# Patient Record
Sex: FEMALE | Race: ASIAN | NOT HISPANIC OR LATINO | URBAN - METROPOLITAN AREA
[De-identification: names, ages, dates, MRNs, and addresses within clinical notes are randomized per-mention and may not be internally consistent; named-entity substitution may affect disease eponyms.]

---

## 2017-01-18 DIAGNOSIS — N91.2 ABSENCE OF MENSTRUATION: Primary | ICD-10-CM

## 2017-01-19 ENCOUNTER — OFFICE VISIT (OUTPATIENT)
Dept: FAMILY MEDICINE | Facility: CLINIC | Age: 42
End: 2017-01-19

## 2017-01-19 VITALS
TEMPERATURE: 97.9 F | OXYGEN SATURATION: 100 % | RESPIRATION RATE: 16 BRPM | HEIGHT: 61 IN | SYSTOLIC BLOOD PRESSURE: 130 MMHG | BODY MASS INDEX: 22.68 KG/M2 | HEART RATE: 74 BPM | WEIGHT: 120.13 LBS | DIASTOLIC BLOOD PRESSURE: 84 MMHG

## 2017-01-19 DIAGNOSIS — Z32.01 PREGNANCY TEST POSITIVE: Primary | ICD-10-CM

## 2017-01-19 DIAGNOSIS — N91.2 ABSENCE OF MENSTRUATION: ICD-10-CM

## 2017-01-19 LAB — HCG UR QL: POSITIVE

## 2017-01-19 RX ORDER — PRENATAL VIT/IRON FUM/FOLIC AC 27MG-0.8MG
1 TABLET ORAL DAILY
Qty: 100 TABLET | Refills: 3 | Status: SHIPPED | OUTPATIENT
Start: 2017-01-19 | End: 2017-10-26

## 2017-01-19 NOTE — PROGRESS NOTES
"S: Stephanie Garcia is a 41 year old female who returns for follow up of  [place] 12/03/15  Patients states that main concern today is  pregnancy     PMHX/PSHX/MEDS/ALLERGIES/SHX/FHX reviewed and updated in Epic.      ROS:  General: No fevers, chills  Head: No headache  Ears: No acute change in hearing.    CV: No chest pain or palpitations.  Resp: No shortness of breath.  No cough. No hemoptysis.  GI: No nausea, vomiting, constipation, diarrhea  : No urinary pains    O: /84 mmHg  Pulse 74  Temp(Src) 97.9  F (36.6  C) (Oral)  Resp 16  Ht 5' 0.75\" (154.3 cm)  Wt 120 lb 2 oz (54.488 kg)  BMI 22.89 kg/m2  SpO2 100%  LMP 12/07/2016  Breastfeeding? No   Gen:  Well nourished and in NAD    CV:  RRR  - no murmurs, rubs, or gallups,   Pulm:  CTAB, no wheezes/rales/rhonchi, good air entry   ABD: soft, nontender, no masses, no rebound, BS intact throughout  Extrem: no cyanosis, edema or clubbing  Psych: Euthymic      (Z32.01) Pregnancy test positive  (primary encounter diagnosis)  Comment: probably 4 weeks  Plan:  Prenatal visit   Prenatal vitamins   Confirmation of pregancy    (N91.2) Absence of menstruation  Comment: see above  Plan: see above    Discussed second  hand smoking  Discussed: high risk pregnancy/age    RTC i for prenatal  visit or sooner if develops new or worsening symptoms.    Edenilson Blunt"

## 2017-01-19 NOTE — PATIENT INSTRUCTIONS
You pregnancy test was positive. You are considered high risk pregnancy due to your age. Starting Prenatal Vitamins at this time is very important. A prescription will be sent to [facility]. Please start these immediately.    It is safe to take Tylenol over the counter during the pregnancy.    Right now you have no physical limitations. This may change later in the pregnancy.    It is safe to have sex during pregnancy.     Mediterranean diet is healthy and safe even during pregnancy.     We deliver babies at [facility].    Schedule a New OB Physical with a Provider right away.    We will have Antonella (our OB Nurse) meet with you before you leave today.

## 2017-02-02 ENCOUNTER — OFFICE VISIT (OUTPATIENT)
Dept: FAMILY MEDICINE | Facility: CLINIC | Age: 42
End: 2017-02-02

## 2017-02-02 ENCOUNTER — ALLIED HEALTH/NURSE VISIT (OUTPATIENT)
Dept: FAMILY MEDICINE | Facility: CLINIC | Age: 42
End: 2017-02-02

## 2017-02-02 VITALS
SYSTOLIC BLOOD PRESSURE: 118 MMHG | HEART RATE: 59 BPM | TEMPERATURE: 97.8 F | BODY MASS INDEX: 22.66 KG/M2 | WEIGHT: 120 LBS | DIASTOLIC BLOOD PRESSURE: 78 MMHG | HEIGHT: 61 IN

## 2017-02-02 DIAGNOSIS — O09.529 SUPERVISION OF HIGH-RISK PREGNANCY OF ELDERLY MULTIGRAVIDA: Primary | ICD-10-CM

## 2017-02-02 DIAGNOSIS — Z34.01 ENCOUNTER FOR SUPERVISION OF NORMAL FIRST PREGNANCY IN FIRST TRIMESTER: Primary | ICD-10-CM

## 2017-02-02 DIAGNOSIS — O09.529 AMA (ADVANCED MATERNAL AGE) MULTIGRAVIDA 35+: ICD-10-CM

## 2017-02-02 LAB
BILIRUBIN UR: NEGATIVE
BLOOD UR: ABNORMAL
GLUCOSE URINE: NEGATIVE
HEMOGLOBIN: 12 G/DL (ref 11.7–15.7)
HIV 1+2 AB+HIV1 P24 AG SERPL QL IA: NEGATIVE
KETONES UR QL: NEGATIVE
LEUKOCYTE ESTERASE UR: NEGATIVE
NITRITE UR QL STRIP: NEGATIVE
PH UR STRIP: 5 [PH] (ref 5–7)
PROTEIN UR: NEGATIVE
SP GR UR STRIP: >=1.03
UROBILINOGEN UR STRIP-ACNC: ABNORMAL

## 2017-02-02 RX ORDER — PYRIDOXINE HCL (VITAMIN B6) 25 MG
25 TABLET ORAL 4 TIMES DAILY PRN
Qty: 120 TABLET | Refills: 1 | Status: SHIPPED | OUTPATIENT
Start: 2017-02-02 | End: 2017-07-11

## 2017-02-02 NOTE — Clinical Note
February 9, 2017      Stephanie Garcia  [address]  [place] [address]        Dear Stephanie,    Please see below for your test results.    Resulted Orders   ABO/Rh Type-HML ([facility])   Result Value Ref Range    ABO/Rh(D) A POS     Repeat ABO/Rh Typing (HML) A POS     Narrative    Test performed by:   [facility] [address], [place] [address]   Antibody Screen ([facility])   Result Value Ref Range    Antibody Screen Negative Negative    Narrative    Test performed by:   [facility] [address], [place] [address]   Urinalysis([facility])   Result Value Ref Range    Specific Gravity Urine >=1.030 1.005 - 1.030    pH Urine 5.0 4.5 - 8.0    Leukocyte Esterase UR Negative NEGATIVE    Nitrite Urine Negative NEGATIVE    Protein UR Negative NEGATIVE    Glucose Urine Negative NEGATIVE    Ketones Urine Negative NEGATIVE    Urobilinogen mg/dL 0.2 E.U./dL 0.2 E.U./dL    Bilirubin UR Negative NEGATIVE    Blood UR 2+ (A) NEGATIVE   Hemoglobin (HGB) ([facility])   Result Value Ref Range    Hemoglobin 12.0 11.7 - 15.7 g/dL   Hepatitis B Surface Ag ([facility])   Result Value Ref Range    Hepatitis B Surface Antigen Negative Negative    Narrative    Test performed by:  [facility] [address] [place] [address]   Syphilis Screen [place]iles ([facility])   Result Value Ref Range    Syphilis Screen [place]ascade Non-Reactive Non-Reactive    Narrative    Test performed by:  [facility] [address] [place] [address]   HIV Ag/Ab Screen [place] ([facility])   Result Value Ref Range    HIV Antigen/Antibody Negative Negative    Narrative    Test performed by:  [facility] [address] [place] [address]   Culture Urine ([facility])   Result Value Ref Range    Culture SEE RESULTS BELOW       Comment:      CULTURE, URINE   SOURCE: Urine, Clean Catch   CULTURE RESULTS:    No Growth      Narrative    Test performed by:  [facility] [address] [place]  [address]   Rubella  IgG ([facility])   Result Value Ref Range    Rubella IgG Immune Immune    Narrative    Test performed by:  [facility] [address] [place] [address]   Chlamydia/Gono Amplified ([facility])   Result Value Ref Range    Chlamydia trac,Amplified Prb Negative Negative    N gonorrhoeae,Amplified Prb Negative Negative    Narrative    Test performed by:  [facility] [address] [place] [address]   Hepatitis B Surface Ab ([facility])   Result Value Ref Range    Hepatitis B Surface Antibody Negative Negative    Narrative    Test performed by:  [facility] [address] [place] [address]   GYN Cytology ([facility])   Result Value Ref Range    Lab AP Case Report SEE RESULTS BELOW       Comment:      Gynecologic Cytology Report                       Case: [id number]                                     Authorizing Provider:  Morales Kee MD         Collected:             02/02/2017 1600              First Screen:          BARBARA Downs (ASCP)   Received:              02/03/2017 0902              Specimen:    SUREPATH PAP, SCREENING, Endocervical/cervical                                               Lab AP Gyn Interpretation SEE RESULTS BELOW       Comment:      Negative for squamous intraepithelial lesion or malignancy  Electronically signed by BARBARA Downs (ASCP) on 2/7/2017 at 12:00 PM      Lab AP Malignant? Normal Normal    Specimen adequacy:       Satisfactory for evaluation, endocervical/transformation zone component present    HPV Reflex? Yes regardless of result     High Risk? No     Last Mens Period dec 7, 2016     Lab AP Abnormal Bleeding No     Lab AP Patient Status pregnant     Lab AP Birth Control/Hormones None     Lab AP Previous Normal > 5 years ago     Lab AP Previous Abnl none     Lab AP Cervical Appearance normal     Narrative    Test performed by:  [facility] [address] [place] [address]   Ree Zoster  Imm Status Michelle ([facility])   Result Value Ref Range    V.zoster Immune Status Immune Immune    Narrative    Test performed by:  [facility] [address] [place] [address]   Lead, Blood ([facility])   Result Value Ref Range    Lead <1.9 <5.0 ug/dL    Collection Method Venous     Narrative    Test performed by:  [facility] [address] [place] [address]   HPV [place] PCR ([facility])   Result Value Ref Range    Interpretation No HPV Type(s) Detected No HPV Type(s) Detected, No High Risk HP     Gabe Chandler MD, [facility]       Comment:         Testing was performed at [facility], [address] [place] [address], with final verification at the indicated laboratory.    Interpretation was performed at [facility] [address] [place] [address].      Narrative    Test performed by:  [facility] [address] [place] [address]  No HPV Type(s) Detected  Interpretation: The sample is negative for the presence of DNA from one or   more of the following high risk HPV types (16, 18, 31, 33, 35, 39, 45, 51, 52,   56, 58, 66, and 68) and/or probable high or low risk HPV types (2a, 6, 11,   26, 30, 32, 34, 42, 43, 44, 53, 54, 55, 57, 61, 62, 67, 69, 70, 71, 72, 73,   74, 77, 81, 82, 83, 84, 85, 87, 89).  High risk types are classified by the   IARC as carcinogenicity, probably, or possible carcinogenic to humans.    According to the IARC, low risk types are not classifiable as to their   carcingenicity to humans. These results do not exclude the possibility of   additional low or high risk HPV types not detected due to adequacy and   representativeness of sampling or assay sensitivity.  Comments: The absence of low and or high risk HPV types reduces the collective   risk for the development of cervical dysplasia or neoplasia.  This result, in   association with the morphology assessment of the Pap  sample are ferraro   information for the determination of follow-up testing and in the clinical   management of the patient.  Methodology:  Genomic DNA was extracted from the submitted specimen and   amplified by the polymerase chain reaction (PCR) using consensus   oligonucleotide primers for the L1 region of the human papillomavirus (HPV)   genome.  Concurrently, the integrity of the extracted DNA was evaluated by the   amplification of beta-globin, a common housekeeping gene.  Result   interpretation is performed by analysis of peak height and size data generated   through fluorescence detection by automated electrophoresis.  HPV DNA   positive PCR products were subjected to digestion by the restriction   endonucleases Hae III, Pst 1, and Rsa 1.  Digested DNA fragments were    by automated electrophoresis.  Digital electropherograms and gel   images of data were generated and the specific HPV type was determined by   matching the restriction fragment patterns of the respective specimens to that   of known HPV restriction fragment patterns.  The analytical and performance   characteristics of this laboratory-developed test (LDT) were determined by   [facility] pursuant to Clinical Laboratory Improvement Amendments (CLIA 88)   requirements.  It has not been cleared or approved by the U.S. Food and Drug   Administration (FDA).  The FDA has determined that such clearance or approval   is not a requirement prior to use for clinical purposes.   All the labs we did at your first OB appointment were normal. They did show that you are not immune to hepatitis B at this time. This could be because you were not immunized or that your immunity has waned over time, which is common. This is something we can address after delivery. We can go over these in more detail at your next OB appointment if you wish. Please feel free to call the clinic with any questions in the meantime. [phone]

## 2017-02-02 NOTE — PATIENT INSTRUCTIONS
Congratulations on your pregnancy!     - Based on your period, you are 8 weeks and 1 day today. This puts your due date at Sept 13, 2017  - For nausea, I sent a prescription for vitamin B6 to [facility] pharmacy. If this isn't helpful, there are other medications we can try  - Please continue to take your prenatal vitamin daily  - We dez blood and did several vaginal tests today. If these are abnormal, I will give you a call. Otherwise I will send a letter with results  - I sent a referral for dating ultrasound today. Please schedule this in about 2 weeks  - Look at the information provided by Antonella and let me know if you have any questions    I will see you back in 4 weeks!

## 2017-02-02 NOTE — MR AVS SNAPSHOT
After Visit Summary   2/2/2017    Stephanie Garcia    MRN: [id number]           Patient Information     Date Of Birth          1975        Visit Information        Provider Department      2/2/2017 2:30 PM Nelia Ward MD [facility]        Today's Diagnoses     Encounter for supervision of normal first pregnancy in first trimester [Z34.01]    -  1       Care Instructions    Congratulations on your pregnancy!     - Based on your period, you are 8 weeks and 1 day today. This puts your due date at Sept 13, 2017  - For nausea, I sent a prescription for vitamin B6 to [facility] pharmacy. If this isn't helpful, there are other medications we can try  - Please continue to take your prenatal vitamin daily  - We dez blood and did several vaginal tests today. If these are abnormal, I will give you a call. Otherwise I will send a letter with results  - I sent a referral for dating ultrasound today. Please schedule this in about 2 weeks  - Look at the information provided by Antonella and let me know if you have any questions    I will see you back in 4 weeks!        Follow-ups after your visit        Future tests that were ordered for you today     Open Future Orders        Priority Expected Expires Ordered    US OB <14 WKS SINGLE OR FIRST GESTATION Routine  2/2/2018 2/2/2017            Who to contact     Please call your clinic at [phone] to:    Ask questions about your health    Make or cancel appointments    Discuss your medicines    Learn about your test results    Speak to your doctor   If you have compliments or concerns about an experience at your clinic, or if you wish to file a complaint, please contact [facility] Patient Relations at [phone] or email us at [email or web address]         Additional Information About Your Visit        MyChart Information     [employer] gives you secure access to your electronic health record. If you see a primary care  "provider, you can also send messages to your care team and make appointments. If you have questions, please call your primary care clinic.  If you do not have a primary care provider, please call [phone] and they will assist you.      [employer] is an electronic gateway that provides easy, online access to your medical records. With [employer], you can request a clinic appointment, read your test results, renew a prescription or communicate with your care team.     To access your existing account, please contact your [facility] or call [phone] for assistance.        Care EveryWhere ID     This is your Care EveryWhere ID. This could be used by other organizations to access your [place] medical records  [id number]        Your Vitals Were     Pulse Temperature Height BMI (Body Mass Index) Last Period       59 97.8  F (36.6  C) (Oral) 5' 0.5\" (153.7 cm) 23.04 kg/m2 12/07/2016        Blood Pressure from Last 3 Encounters:   02/02/17 118/78   01/19/17 130/84   10/26/15 134/90    Weight from Last 3 Encounters:   02/02/17 120 lb (54.432 kg)   01/19/17 120 lb 2 oz (54.488 kg)   10/26/15 124 lb (56.246 kg)              We Performed the Following     ABO/Rh Type-HML ([facility])     Antibody Screen ([facility])     Chlamydia/Gono Amplified ([facility])     Culture Urine ([facility])     [facility]     Hemoglobin (HGB) ([facility])     Hepatitis B Surface Ab ([facility])     Hepatitis B Surface Ag ([facility])     HIV Ag/Ab Screen [place] ([facility])     Lead, Blood ([facility])     Rubella  IgG ([facility])     Syphilis Screen [place] ([facility])     Urinalysis([facility])     Ree Zoster Imm Status Michelle ([facility])          Today's Medication Changes          These changes are accurate as of: 2/2/17  3:43 PM.  If you have any questions, ask your nurse or doctor.               Start taking these medicines.        Dose/Directions    pyridOXINE 25 MG tablet   Commonly known as:  " vitamin B-6   Used for:  Encounter for supervision of normal first pregnancy in first trimester   Started by:  Nelia Ward MD        Dose:  25 mg   Take 1 tablet (25 mg) by mouth 4 times daily as needed   Quantity:  120 tablet   Refills:  1            Where to get your medicines      These medications were sent to [facility] - [place], [place] - [address], [place] [address]     Phone:  [phone]    - pyridOXINE 25 MG tablet             Primary Care Provider Office Phone # Fax #    Renee Zandra Zelaya [phone]       [facility] [address] [address]        Thank you!     Thank you for choosing [facility]  for your care. Our goal is always to provide you with excellent care. Hearing back from our patients is one way we can continue to improve our services. Please take a few minutes to complete the written survey that you may receive in the mail after your visit with us. Thank you!             Your Updated Medication List - Protect others around you: Learn how to safely use, store and throw away your medicines at www.disposemymeds.org.          This list is accurate as of: 2/2/17  3:43 PM.  Always use your most recent med list.                   Brand Name Dispense Instructions for use    prenatal multivitamin  plus iron 27-0.8 MG Tabs per tablet     100 tablet    Take 1 tablet by mouth daily       pyridOXINE 25 MG tablet    vitamin B-6    120 tablet    Take 1 tablet (25 mg) by mouth 4 times daily as needed

## 2017-02-02 NOTE — PROGRESS NOTES
Preceptor attestation:  Patient seen and discussed with the resident.  Assessment and plan reviewed with resident and agreed upon.  Supervising physician: Morales Kee  [facility]

## 2017-02-02 NOTE — PROGRESS NOTES
Past Medical History     Do you have a history of any of the following medical conditions?    Condition No/Yes/Details   Hypertension No - h/o borderline BP last year but not on meds   Heart disease, mitral valve prolapse, rheumatic fever No    Asthma or another chronic lung disease No    An autoimmune disorder No    Kidney disease No    Frequent urinary tract infections No    Epilepsy, seizures, or spells No    Migraine headaches No    Stroke, loss of sensation/function, seizures, or other neuro problem No    Diabetes No    Thyroid problems or have you taken thyroid medication No    Hepatitis, liver disease, jaundice No    Blood clots, phlebitis, pulmonary embolism or varicose veins No    Excessive bleeding after surgery or dental work No    Do you have more bleeding than other women after a cut or a scratch? No    Anemia No    Blood transfusions No         Would you refuse a blood transfusion?       No   If yes, then ask next question. If no, skip next question.   Would you rather die than receive a blood transfusion? No    Breast problems No    Have you ever ? Yes- plans to breastfeed   Abnormalities of the uterus No    Abnormal pap smear No    Have you ever been treated for depression? No    Are you having problems with crying spells or loss of self-esteem? No    Have you ever required psychiatric care? No    Have you been physically, sexually, or emotionally hurt by someone? No    Have you been in a major accident or suffered serious trauma? No    Have you ever had any gynecological surgical procedures such as cervical conization, LEEP, laser treatment, cryosurgery of the cervix, or a dilatation and curettage? No    Have you had any other surgical procedures? No         Have you ever had any complications from anesthesia? No    Have you ever been hospitalized for a nonsurgical reason? No             Substance use and exposure     Does anyone in your home smoke? No    Do you use tobacco products  or betel nut? No    Do you drink beer, wine, or hard liquor? No    Do you use any of the following: marijuana, speed, cocaine, heroin, hallucinogens, or other drugs? No               Symptoms since Last Menstrual Period     Do you currently have any of the following symptoms: No/Yes/Details        Abdominal pain No         Blood in the stools or urine No         Chest pain No         Shortness of breath No         coughing or vomiting up blood No         Your heart racing or skipping beats No         Nausea or vomiting  YES nausea and vomiting 2-3 x's per week        Pain on urination No         Vaginal discharge or bleeding No                Genetic Screening          Is the patient 35 years or older?  YES would like to do quad screen and level 2 u/s     Do you have a history of any of the following No/Yes/Details        A metabolic disorder (e.g. Insulin-dependent DM, PKU) No         Recurrent pregnancy loss or still birth No      Do you, the baby's father, or anyone in your families have          Thalassemia AND MCV <80 No         Hemophilia No         Neural tube defect No }        Congenital heart defect No         Sickle cell disease or trait No         Muscular dystrophy No         Cystic fibrosis No         Mental retardation or autism No         Down's syndrome No         Jeremias-Sach's disease No         Hays's chorea No         Any other inherited genetic or chromosomal disorder No         A child with birth defects not listed above No                Infection History     Ever treated for tuberculosis or had a positive skin test No    Ever had genital herpes (or has your partner) No    Had a rash or viral illness since LMP No    Ever had a sexually transmitted infection No    Ever had chicken pox or the vaccine No    Have you had a sexual partner who is HIV positive No    Ever had any other serious infectious disease No                Risk Assessment     Average Risk Category  No significant risk  factors: Yes    At Risk Category (up to 3)  Teen pregnancy: No  Poor social situation: No  Domestic abuse: No  Financial difficulties: No  Smoker: No  H/O  deliver: No  H/O drug abuse: No  Non-English speaking: No  Advanced maternal age: Yes  GDM risks: No  Previous C/S: No  H/O PIH: No  H/O STIs: No  H/O mental health concerns: No  Onset care > 20 weeks: No      High Risk Category (4 or more At Risk or)  Diabetes/GDM: No  Multiple gestation: No  Chronic hypertension: No  Significant hx of asthma: No  Fetal demise > 20 weeks: No  Positive tox screen: No  Current mental health treatment: No      Risk: At Risk   Date determined:17

## 2017-02-02 NOTE — PROGRESS NOTES
First Obstetric Visit       HPI       Stephanie Garcia is a 41 year old woman who presents for an initial prenatal visit at 8w1d weeks of pregnancy with JAYDEN of Sep 13, 2017 by LMP of Patient's last menstrual period was 2016..      She has not had bleeding since her LMP.   She has had mild nausea.   Weight loss has occurred, for a total of 3 pounds.    This was a planned pregnancy.     OTHER CONCERNS:     Pt is elderly  and would like genetic screening given her increased risk.    Pt does have history of borderline HTN previously in our clinic. She has never been on medication for this. BP is 118/78 today.    Have you travelled during the pregnancy?No  Have your sexual partner(s) travelled during the pregnancy?No            Labor Risk Assessment     Is the patient's age <18 or >40?     No  Patint's BMI is Body mass index is 23.04 kg/(m^2).   Does patient have a BMI < 18.5?     No  Prior delivery within 6 months?      No  Ever delivered prior to 37 weeks gestation?  No  Pregnancy occur via In Vitro Fertilization?   No  Are you carrying twins?       No    The patient has the following additional risk factors for  labor:   none     Labor Risk Summary:  Pt is high risk for  Labor  No             Past Medical History     Past Medical History   Diagnosis Date     NO ACTIVE PROBLEMS      See nurse history note, reviewed in detail.             Current Medications      Current Outpatient Prescriptions   Medication Sig Dispense Refill     pyridOXINE (VITAMIN B-6) 25 MG tablet Take 1 tablet (25 mg) by mouth 4 times daily as needed 120 tablet 1     Prenatal Vit-Fe Fumarate-FA (PRENATAL MULTIVITAMIN  PLUS IRON) 27-0.8 MG TABS per tablet Take 1 tablet by mouth daily 100 tablet 3             Review of Systems      normal menstrual cycles, negative for, decreased urinary stream, dysuria, frequency, hesitancy, sexually transmitted disease, urgency, vaginal discharge and  "bleeding  ====================================================           Physical Exam      /78 mmHg  Pulse 59  Temp(Src) 97.8  F (36.6  C) (Oral)  Ht 5' 0.5\" (153.7 cm)  Wt 120 lb (54.432 kg)  BMI 23.04 kg/m2  LMP 12/07/2016  GENERAL: healthy, alert and no distress  EYES: Eyes grossly normal to inspection, extraocular movements - intact, and PERRL  HENT: ear canals- normal; TMs- normal; Nose- normal; Mouth- no ulcers, no lesions  NECK: no tenderness, no adenopathy, no asymmetry, no masses, no stiffness; thyroid- normal to palpation  RESP: lungs clear to auscultation - no rales, no rhonchi, no wheezes  BREAST: Pt declined  CV: regular rates and rhythm, normal S1 S2, no S3 or S4 and no murmur, no click or rub -  ABDOMEN: soft, no tenderness, no  hepatosplenomegaly, no masses, normal bowel sounds  MS: extremities- no gross deformities noted, no edema  SKIN: no suspicious lesions, no rashes  NEURO: strength and tone- normal, sensory exam- grossly normal, mentation- intact, speech- normal, reflexes- symmetric  FHT not obtained today  GENITALIA:  BUS WNL, no lesions noted   VAGINA:  Pink, normal rugae and discharge normal and physiologic  CERVIX:  smooth, without discharge or CMT and parous os,   firm/ closed 3-4 cm long. Cervix friable  UTERUS: nontender 8 weeks in size.  ADNEXA: Without masses or tenderness    Past labs reviewed:  Varicella immune unknown  Hepatitis B immune unknown  Last pap >5 years ago.  She is due for this today.    =========================================         Assessment and Plan     Stephanie was seen today for prenatal care.    Diagnoses and all orders for this visit:    Encounter for supervision of normal first pregnancy in first trimester [Z34.01]  -     ABO/Rh Type-HML ([facility])  -     Antibody Screen ([facility])  -     Urinalysis([facility])  -     Hemoglobin (HGB) ([facility])  -     Hepatitis B Surface Ag ([facility])  -     Syphilis Screen [place] ([facility])  -     HIV Ag/Ab " Screen [place] ([facility])  -     Culture Urine ([facility])  -     Rubella  IgG ([facility])  -     Chlamydia/Gono Amplified [facility])  -     Hepatitis B Surface Ab [facility])  -     GYN Cytology ([facility])  -     Ree Zoster Imm Status Michelle ([facility])  -     Lead, Blood ([facility])  -     pyridOXINE (VITAMIN B-6) 25 MG tablet; Take 1 tablet (25 mg) by mouth 4 times daily as needed  -     US OB <14 WKS SINGLE OR FIRST GESTATION; Future      Hx of borderline HTN: Normotensive today. We will continue to monitor. Discussed warning signs for pre-eclampsia.    41 year old  , 8w1d weeks of pregnancy with JAYDEN of Sep 13, 2017 by LMP of Patient's last menstrual period was 2016..      Pregnancy Risk Assessment: At Risk  Discussed high risk conditions as follows:   advanced maternal age - patient would like genetic screening, level 2 US and quad screen    -Dating US obtained and dating confirmed?   NO (Recommended)  -Taking PNV/Folate?     YES      - Ordered new OB labs: blood type and antibody screen, HIV, VDRL, hep B, rubella, UA/UC, gonorrhea/chlamydia, Pap smear, Hepatitis B immunity and Varicella immunity done today.  -Discussed risks and benefits of first trimester screen for trisomies, patient would like to further consider this  Discussed risks and benefits of second trimester quad screen for trisomies between 15-20 weeks EGA, patient agreed. Will obtain when appropriate.   - Discussed genetic screening. Patient does want to pursue screening.   - Discussed screening for sickle cell anemia. Patient does not  want to pursue Hb electrophoresis.   - Discussed early screening for gestational diabetes. The patient does not have a history of GDM, BMI>30, h/o prediabetes/glucose intolerance, first degree relative with GDM or DM, or chronic HTN, so WILL NOT obtain early GCT or A1c.  - The patient does not h/o severe, early preeclampsia with delivery <34weeks, preeclampsia in more than one pregnancy,  pre-gestational diabetes, chronic hypertension, renal disease, or autoimmune disease so WILL NOT start low dose aspirin (81mg) and calcium supplementation (1g-1.5g/day) to prevent preeclampsia.  - Prenatal vitamins ordered.  - Flu shot offered and was postponed.    Counseling given:   - Follow up in 4 weeks for return OB visit.  - Recommended weight gain for pregnancy: 25-35 lbs.  - Instructed on best evidence for: healthy diet and foods to avoid; exercise and activity during pregnancy; avoiding exposure to toxoplasmosis; safe use of seatbelts during pregnancy; and maintenance of a generally healthy lifestyle  -Patient to see OB educator/ RN today and/or next visit.    Discussed the harms, benefits, side effects and alternative therapies for current prescribed and OTC medications.       Options for treatment and follow-up care were reviewed with the patient and/or guardian. Stephanie Garcia and/or guardian engaged in the decision making process and verbalized understanding of the options discussed and agreed with the final plan.    Nelia Ward MD

## 2017-02-03 PROBLEM — O09.529 AMA (ADVANCED MATERNAL AGE) MULTIGRAVIDA 35+: Status: ACTIVE | Noted: 2017-02-02

## 2017-02-03 PROBLEM — O09.529 SUPERVISION OF HIGH-RISK PREGNANCY OF ELDERLY MULTIGRAVIDA: Status: ACTIVE | Noted: 2017-02-02

## 2017-02-03 LAB
ABO + RH BLD: NORMAL
BLD GP AB SCN SERPL QL: NEGATIVE
C TRACH RRNA CVX QL NAA+PROBE: NEGATIVE
CULTURE: NORMAL
HBV SURFACE AB SER-ACNC: NEGATIVE M[IU]/ML
HBV SURFACE AG SERPL QL IA: NEGATIVE
N GONORRHOEA RRNA SPEC QL NAA+PROBE: NEGATIVE
REPEAT ABO/RH TYPING (HML): NORMAL
RPR SER QL: NORMAL
RUBV IGG SERPL QL IA: NORMAL
VZV IGG SER QL IF: NORMAL

## 2017-02-03 NOTE — NURSING NOTE
Family Medicine OB Education    I provided the following OB education to Stephanie Michaelmaritalila.    Discussed that Dr Ward should be the doctor that she sees for her prenatal visits and that Dr Ward will try hard to be the one to deliver her baby.  Discussed that if Dr Ward was unavailable that one of our other physicians would deliver the baby and that this could be a male or female provider.  Briefly discussed residency program and that multiple doctors would be present at time of delivery.  Sheet given and discussed fetal growth and development.  Sheet given and discussed warning signs with reasons to call clinic or L&D with questions or concerns (phone numbers given).  Sheet given and discussed Tdap to be given after 27 weeks gestation and the importance of family members get immunized before delivery.  Proof of pregnancy completed and given to pt.  Gave pt preregistration form for [facility] to complete.  See questionnaire and pregnancy risk assessment for further information in provider encounter.       Name of provider who requested the OB education: Dr Ward  Name of provider on site at the time of performing the OB education: Dr Chilango Koehler, RN BSN

## 2017-02-05 LAB
COLLECTION METHOD: NORMAL
LEAD BLD-MCNC: <1.9 UG/DL

## 2017-02-07 ENCOUNTER — RECORDS - HEALTHEAST (OUTPATIENT)
Dept: ADMINISTRATIVE | Facility: OTHER | Age: 42
End: 2017-02-07

## 2017-02-07 LAB
HIGH RISK?: NO
HPV REFLEX?: NORMAL
INTERPRETATION: NORMAL
INTERPRETER: NORMAL
LAB AP ABNORMAL BLEEDING: NO
LAB AP BIRTH CONTROL/HORMONES: NORMAL
LAB AP CASE REPORT: NORMAL
LAB AP CERVICAL APPEARANCE: NORMAL
LAB AP GYN INTERPRETATION: NORMAL
LAB AP MALIGNANT?: NORMAL
LAB AP PATIENT STATUS: NORMAL
LAB AP PREVIOUS ABNL: NORMAL
LAB AP PREVIOUS NORMAL: NORMAL
LAST MENS PERIOD: NORMAL
SPECIMEN ADEQUACY:: NORMAL

## 2017-02-08 NOTE — PROGRESS NOTES
Quick Note:    Please send letter with the following message:    All the labs we did at your first OB appointment were normal. They did show that you are not immune to hepatitis B at this time. This could be because you were not immunized or that your immunity has waned over time, which is common. This is something we can address after delivery. We can go over these in more detail at your next OB appointment if you wish. Please feel free to call the clinic with any questions in the meantime. [phone]  ______

## 2017-02-09 ENCOUNTER — TELEPHONE (OUTPATIENT)
Dept: FAMILY MEDICINE | Facility: CLINIC | Age: 42
End: 2017-02-09

## 2017-02-09 NOTE — TELEPHONE ENCOUNTER
Pt states that her daughter has head lice and she used store bought NIX to treat it.  She states that she wore gloves and a mask since she is 9 wks pregnant.  Pt is wondering if she can use NIX on herself?    Discussed with Roya- pharm JESSICA and Dr Cleveland and they recommend against since she is in the 1st trimester.  Dr Cleveland recommends using home remedy treatment.    Called pt back and told her above.  Told her that she can use fine tooth comb through her hair when it is wet with conditioner from the scalp to the ends wiping after each section onto a paper towel repeating through whole head twice every 3 days until no longer sees lice or nits.  Told her that she could saturate her whole head/hair with mayonnaise, butter, or olive oil, then cover with shower cap or plastic wrap, wash out the next day, comb through as above repeating steps until no longer sees lice or nits. Discussed vacuuming, washing what can be washed in hot water, and bagging up things that cannot be washed into a garbage bag x's 2 weeks.  Told her to call with further questions or concerns.  Routed note to Dr Ward./HARISH

## 2017-02-09 NOTE — TELEPHONE ENCOUNTER
[facility] phone call message- general phone call:    Reason for call: Pt has questions on preg     Return call needed: Yes    OK to leave a message on voice mail? Yes    Primary language: English      needed? No    Call taken on February 9, 2017 at 2:09 PM by Maisha Mccoy

## 2017-02-27 DIAGNOSIS — Z34.01 ENCOUNTER FOR SUPERVISION OF NORMAL FIRST PREGNANCY IN FIRST TRIMESTER: ICD-10-CM

## 2017-03-14 ENCOUNTER — OFFICE VISIT (OUTPATIENT)
Dept: FAMILY MEDICINE | Facility: CLINIC | Age: 42
End: 2017-03-14

## 2017-03-14 VITALS
BODY MASS INDEX: 23.78 KG/M2 | TEMPERATURE: 97.8 F | DIASTOLIC BLOOD PRESSURE: 78 MMHG | OXYGEN SATURATION: 99 % | HEART RATE: 71 BPM | SYSTOLIC BLOOD PRESSURE: 116 MMHG | WEIGHT: 123.8 LBS

## 2017-03-14 DIAGNOSIS — O09.529 SUPERVISION OF HIGH-RISK PREGNANCY OF ELDERLY MULTIGRAVIDA: Primary | ICD-10-CM

## 2017-03-14 NOTE — PROGRESS NOTES
Preceptor attestation:  Patient seen and discussed with the resident. Assessment and plan reviewed with resident and agreed upon.  Supervising physician: Jr Rogers  [facility]

## 2017-03-14 NOTE — PATIENT INSTRUCTIONS
You are 13 weeks and 6 days pregnant today based on your last menstrual period    I will put in the order for genetic testing today. You can stop in the lab anytime between 16 and 18 weeks to have your blood drawn. I will call you with results    We will order your level 2 fetal survey at the next visit    Some non-medication things to try for nausea:  - Separate liquids and solids at meals  - peppermint  - lemon    I will see you back in 4 weeks. Please feel free to call the clinic if anything else comes up before then!

## 2017-03-14 NOTE — PROGRESS NOTES
Subjective  Concerns:   Continues to have some N/V especially in the evening. Previously vomiting 4-5 times per week, now down to 1-2 times. Notes that this has been keeping her out of work at times. Did try vitamin B6 without much improvement. Would like to hold off on trying other meds for now as symptoms seem to be improving on their own.    Wondering if she can use hair dye while she is pregnant. Typically uses a revlon product, but is also wondering about a judith product. Discussed that any chemical exposures should be avoided if possible. However, hair dyes without ammonia and/or natural products would be preferable.     Again discussed genetic testing. Pt would like to pursue quad screen and level 2 US for fetal survey.     Denies vaginal bleeding, LOF, dysuria, abnormal vaginal discharge or LE edema. No quickening noted.    Have you travelled during the pregnancy?No  Have your sexual partner(s) travelled during the pregnancy?No    Patient Active Problem List   Diagnosis     Supervision of high-risk pregnancy of elderly multigravida     AMA (advanced maternal age) multigravida 35+       Stephanie Garcia speaks English so an  was not used today.    Guidance:  OTC medications  genetic testing  weight gain and exercise  quickening  fetal survey ultrasound    Objective  /78 (BP Location: Left arm, Patient Position: Chair)  Pulse 71  Temp 97.8  F (36.6  C) (Oral)  Wt 123 lb 12.8 oz (56.2 kg)  LMP 12/07/2016 (Exact Date)  SpO2 99%  BMI 23.78 kg/m2  No distress.  Gravid abdomen.  .  Fundal height below the umbilicus.  no edema.    Results  Blood type: A POS  Results for orders placed or performed in visit on 02/02/17   ABO/Rh Type-HML ([facility])   Result Value Ref Range    ABO/Rh(D) A POS     Repeat ABO/Rh Typing ([facility]) A POS     Narrative    Test performed by:   [facility]  [address], [place] [address]   Antibody Screen ([facility])   Result Value Ref Range    Antibody Screen  Negative Negative    Narrative    Test performed by:   [facility]  [address], [place] [address]   Urinalysis([facility])   Result Value Ref Range    Specific Gravity Urine >=1.030 1.005 - 1.030    pH Urine 5.0 4.5 - 8.0    Leukocyte Esterase UR Negative NEGATIVE    Nitrite Urine Negative NEGATIVE    Protein UR Negative NEGATIVE    Glucose Urine Negative NEGATIVE    Ketones Urine Negative NEGATIVE    Urobilinogen mg/dL 0.2 E.U./dL 0.2 E.U./dL    Bilirubin UR Negative NEGATIVE    Blood UR 2+ (A) NEGATIVE   Hemoglobin (HGB) ([facility])   Result Value Ref Range    Hemoglobin 12.0 11.7 - 15.7 g/dL   Hepatitis B Surface Ag ([facility])   Result Value Ref Range    Hepatitis B Surface Antigen Negative Negative    Narrative    Test performed by:  [facility] [address] [place] [address]   Syphilis Screen [place] ([facility])   Result Value Ref Range    Syphilis Screen [place] Non-Reactive Non-Reactive    Narrative    Test performed by:  [facility] [address] [place] [address]   HIV Ag/Ab Screen [place] ([facility])   Result Value Ref Range    HIV Antigen/Antibody Negative Negative    Narrative    Test performed by:  [facility] [address] [place] [address]   Culture Urine ([facility])   Result Value Ref Range    Culture SEE RESULTS BELOW     Narrative    Test performed by:  [facility] [address] [place] [address]   Rubella  IgG ([facility])   Result Value Ref Range    Rubella IgG Immune Immune    Narrative    Test performed by:  [facility] [address] [place] [address]   Chlamydia/Gono Amplified ([facility])   Result Value Ref Range    Chlamydia trac,Amplified Prb Negative Negative    N gonorrhoeae,Amplified Prb Negative Negative    Narrative    Test performed by:  [facility] [address] [place] [address]   [facility] B Surface Ab [facility]   Result Value Ref Range    Hepatitis B Surface  Antibody Negative Negative    Narrative    Test performed by:  [facility] [address] [place] [address]   GYN Cytology ([facility])   Result Value Ref Range    Lab AP Case Report SEE RESULTS BELOW     Lab AP Gyn Interpretation SEE RESULTS BELOW     Lab AP Malignant? Normal Normal    Specimen adequacy:       Satisfactory for evaluation, endocervical/transformation zone component present    HPV Reflex? Yes regardless of result     High Risk? No     Last Mens Period dec 7, 2016     Lab AP Abnormal Bleeding No     Lab AP Patient Status pregnant     Lab AP Birth Control/Hormones None     Lab AP Previous Normal > 5 years ago     Lab AP Previous Abnl none     Lab AP Cervical Appearance normal     Narrative    Test performed by:  [facility] [address] [place] [address]   Ree Zoster Imm Status Michelle ([facility])   Result Value Ref Range    V.zoster Immune Status Immune Immune    Narrative    Test performed by:  [facility] [address] [place] [address]   Lead, Blood ([facility])   Result Value Ref Range    Lead <1.9 <5.0 ug/dL    Collection Method Venous     Narrative    Test performed by:  [facility] [address] [place] [address]   HPV [place] PCR ([facility])   Result Value Ref Range    Interpretation No HPV Type(s) Detected No HPV Type(s) Detected, No High Risk HP     Gabe Chandler MD, [facility]     Narrative    Test performed by:  [facility] [address] [place] [address]  No HPV Type(s) Detected  Interpretation: The sample is negative for the presence of DNA from one or   more of the following high risk HPV types (16, 18, 31, 33, 35, 39, 45, 51, 52,   56, 58, 66, and 68) and/or probable high or low risk HPV types (2a, 6, 11,   26, 30, 32, 34, 42, 43, 44, 53, 54, 55, 57, 61, 62, 67, 69, 70, 71, 72, 73,   74, 77, 81, 82, 83, 84, 85, 87, 89).  High risk types are classified by the   IARC as  carcinogenicity, probably, or possible carcinogenic to humans.    According to the IARC, low risk types are not classifiable as to their   carcingenicity to humans. These results do not exclude the possibility of   additional low or high risk HPV types not detected due to adequacy and   representativeness of sampling or assay sensitivity.  Comments: The absence of low and or high risk HPV types reduces the collective   risk for the development of cervical dysplasia or neoplasia.  This result, in   association with the morphology assessment of the Pap sample are ferraro   information for the determination of follow-up testing and in the clinical   management of the patient.  Methodology:  Genomic DNA was extracted from the submitted specimen and   amplified by the polymerase chain reaction (PCR) using consensus   oligonucleotide primers for the L1 region of the human papillomavirus (HPV)   genome.  Concurrently, the integrity of the extracted DNA was evaluated by the   amplification of beta-globin, a common housekeeping gene.  Result   interpretation is performed by analysis of peak height and size data generated   through fluorescence detection by automated electrophoresis.  HPV DNA   positive PCR products were subjected to digestion by the restriction   endonucleases Hae III, Pst 1, and Rsa 1.  Digested DNA fragments were    by automated electrophoresis.  Digital electropherograms and gel   images of data were generated and the specific HPV type was determined by   matching the restriction fragment patterns of the respective specimens to that   of known HPV restriction fragment patterns.  The analytical and performance   characteristics of this laboratory-developed test (LDT) were determined by   [employer] pursuant to Clinical Laboratory Improvement Amendments (CLIA 88)   requirements.  It has not been cleared or approved by the U.S. Food and Drug   Administration (FDA).  The FDA has determined that such  clearance or approval   is not a requirement prior to use for clinical purposes.       Assessment & Plan  41 year old  at 13w6d with JAYDEN Sep 13, 2017 based on LMP  C/w 12 week US    1. Supervision of high-risk pregnancy of elderly multigravida  - AFP 4-Marker Scn ([facility]); Future    Weight gain adequate: 4 lb 12.8 oz (2.177 kg) to date, out of recommended total of 15-25 lbs (pregravid BMI 25-29.9)    Return to clinic in 4 weeks.    Nelia Ward PGY-1  I precepted today with Jr Rogers MD.

## 2017-03-14 NOTE — MR AVS SNAPSHOT
After Visit Summary   3/14/2017    Stephanie Garcia    MRN: [id number]           Patient Information     Date Of Birth          1975        Visit Information        Provider Department      3/14/2017 1:50 PM Nelia Ward MD [facility]        Care Instructions    You are 13 weeks and 6 days pregnant today based on your last menstrual period    I will put in the order for genetic testing today. You can stop in the lab anytime between 16 and 18 weeks to have your blood drawn. I will call you with results    We will order your level 2 fetal survey at the next visit    Some non-medication things to try for nausea:  - Separate liquids and solids at meals  - peppermint  - lemon    I will see you back in 4 weeks. Please feel free to call the clinic if anything else comes up before then!        Follow-ups after your visit        Who to contact     Please call your clinic at [phone] to:    Ask questions about your health    Make or cancel appointments    Discuss your medicines    Learn about your test results    Speak to your doctor   If you have compliments or concerns about an experience at your clinic, or if you wish to file a complaint, please contact [facility] Patient Relations at [phone] or email us at [email or web address]         Additional Information About Your Visit        MyChart Information     [employer] gives you secure access to your electronic health record. If you see a primary care provider, you can also send messages to your care team and make appointments. If you have questions, please call your primary care clinic.  If you do not have a primary care provider, please call [phone] and they will assist you.      [employer] is an electronic gateway that provides easy, online access to your medical records. With [employer], you can request a clinic appointment, read your test results, renew a prescription or communicate with your care  team.     To access your existing account, please contact your [facility] or call [phone] for assistance.        Care EveryWhere ID     This is your Care EveryWhere ID. This could be used by other organizations to access your [place] medical records  [id number]        Your Vitals Were     Pulse Temperature Last Period Pulse Oximetry BMI (Body Mass Index)       71 97.8  F (36.6  C) (Oral) 12/07/2016 99% 23.78 kg/m2        Blood Pressure from Last 3 Encounters:   03/14/17 116/78   02/02/17 118/78   01/19/17 130/84    Weight from Last 3 Encounters:   03/14/17 123 lb 12.8 oz (56.2 kg)   02/02/17 120 lb (54.4 kg)   01/19/17 120 lb 2 oz (54.5 kg)              Today, you had the following     No orders found for display       Primary Care Provider Office Phone # Fax #    Nelia Ward [phone]       [facility] [address] [place] [address]        Thank you!     [facility]. Our goal is always to provide you with excellent care. Hearing back from our patients is one way we can continue to improve our services. Please take a few minutes to complete the written survey that you may receive in the mail after your visit with us. Thank you!             Your Updated Medication List - Protect others around you: Learn how to safely use, store and throw away your medicines at www.disposemymeds.org.          This list is accurate as of: 3/14/17  2:52 PM.  Always use your most recent med list.                   Brand Name Dispense Instructions for use    prenatal multivitamin  plus iron 27-0.8 MG Tabs per tablet     100 tablet    Take 1 tablet by mouth daily       pyridOXINE 25 MG tablet    vitamin B-6    120 tablet    Take 1 tablet (25 mg) by mouth 4 times daily as needed

## 2017-03-23 ENCOUNTER — TELEPHONE (OUTPATIENT)
Dept: FAMILY MEDICINE | Facility: CLINIC | Age: 42
End: 2017-03-23

## 2017-03-23 DIAGNOSIS — O09.529 AMA (ADVANCED MATERNAL AGE) MULTIGRAVIDA 35+: ICD-10-CM

## 2017-03-23 NOTE — TELEPHONE ENCOUNTER
Pt states that she has a relative/friend that is about as far along as she is in her pregnancy.  Her friend had first trimester screening, early u/s and amniocentesis and her baby was found to have a serious problem.  Pt does not know if it is Downs Syndrome or a neural tube defect.  Pt is wondering if she could do quad screen now or if has to wait?  Told her that she could come in today to have blood drawn and the results take about 1 week to come back.  Reminded her that this is a screening test and gives an idea of whether there is a problem or not.  Told her that if result is positive that she would be referred to [facility] for consult and would be offered further confirmatory testing including blood work and u/s.  Told her that she will be referred to [facility] for Level 2 u/s and genetic consult at 20 wks as previously discussed no matter the results of the quad screen.  Pt verbalized understanding and will come in today at about 11:15 am for blood draw.  Told her that Dr Ward or I would call her as soon as the result is back.  Pt verbalized understanding and has no further questions./NG

## 2017-03-23 NOTE — TELEPHONE ENCOUNTER
[facility] phone call message- general phone call:    Reason for call: She has a question re blood work  wanted her to do.    Return call needed: Yes    OK to leave a message on voice mail? Yes      Primary language: English      needed? No    Call taken on March 23, 2017 at 10:15 AM by Eliza Bustamante

## 2017-03-27 LAB
ALPHA FETO PROTEIN: NORMAL
CALCULATED AGE AT EDD: NORMAL
DOWN SYNDROME MATERNAL AGE RISK: NORMAL
DOWN SYNDROME SCREEN RISK ESTIMATE: NORMAL
EDD BY LMP: NORMAL
GA ON COLLECTION BY DATES: NORMAL WK,D
GA USED IN RISK ESTIMATE: NORMAL
GENERAL TEST INFORMATION: NORMAL
HCG, TOTAL: NORMAL
INHIBIN: NORMAL
INSULIN DIABETIC: NORMAL
INTERPRETATION: NORMAL
OTHER INFORMATION: NORMAL
PATIENT WEIGHT: 123 LBS
RACE OF MOTHER: NORMAL
RECOMMENDED FOLLOW UP: NORMAL
TRISOMY 18 SCREEN RISK ESTIMATE: NORMAL
UE3: NORMAL

## 2017-03-27 NOTE — TELEPHONE ENCOUNTER
Gave info to pt.  Told her that she will still be referred to [place]  for Level 2 u/s as previously requested and if they saw anything concerning they would offer additional testing.  Pt verbalized understanding and had no further questions.  Routed note to Dr Ward./HARISH

## 2017-03-27 NOTE — TELEPHONE ENCOUNTER
[facility] to let her know that quad screen result is back. /NG       Down Syndrome Screen Risk Estimate 1/700    Final 03/23/2017 11:26 AM 31   Down Syndrome Maternal Age Risk 1/75    Final 03/23/2017 11:26 AM 31   Trisomy 18 Screen Risk Estimate 1/2,900    Final 03/23/2017 11:26 AM 31   Interpretation SEE BELOW    Final 03/23/2017 11:26 AM 31   Comment:   Screen negative for neural tube defects and Down syndrome.   The risk for trisomy 18 is less than 1%.

## 2017-04-12 ENCOUNTER — OFFICE VISIT (OUTPATIENT)
Dept: FAMILY MEDICINE | Facility: CLINIC | Age: 42
End: 2017-04-12

## 2017-04-12 VITALS
SYSTOLIC BLOOD PRESSURE: 114 MMHG | WEIGHT: 130 LBS | TEMPERATURE: 97.7 F | BODY MASS INDEX: 24.55 KG/M2 | HEART RATE: 66 BPM | DIASTOLIC BLOOD PRESSURE: 73 MMHG | HEIGHT: 61 IN

## 2017-04-12 DIAGNOSIS — O09.529 SUPERVISION OF HIGH-RISK PREGNANCY OF ELDERLY MULTIGRAVIDA: Primary | ICD-10-CM

## 2017-04-12 NOTE — PATIENT INSTRUCTIONS
You are 18 weeks 0 days pregnant today!    - Please continue to take your prenatal vitamin    - I ordered the referral for level 2 ultrasound today. This will occur at the perinatologist's office. Please stop at the [facility] desk to schedule this appointment    - If frequent HA, change in vision or swelling of hands or feet, please call the clinic    - I would like to see you again in 4 weeks    [facility]  [phone]  Referral and records have been faxed they will contact pt to schedule  Ashia Phillips 3:08 PM 4/12/2017

## 2017-04-12 NOTE — MR AVS SNAPSHOT
After Visit Summary   4/12/2017    Stephanie Garcia    MRN: [id number]           Patient Information     Date Of Birth          1975        Visit Information        Provider Department      4/12/2017 11:20 AM Nelia Ward MD [facility]        Today's Diagnoses     Supervision of high-risk pregnancy of elderly multigravida    -  1      Care Instructions    You are 18 weeks 0 days pregnant today!    - Please continue to take your prenatal vitamin    - I ordered the referral for level 2 ultrasound today. This will occur at the perinatologist's office. Please stop at the [facility] desk to schedule this appointment    - If frequent HA, change in vision or swelling of hands or feet, please call the clinic    - I would like to see you again in 4 weeks        Follow-ups after your visit        Additional Services     PERINATOLOGY REFERRAL       Patient to stop at the [facility] Desk     Patient's last menstrual period was 12/07/2016 (exact date).  Sep 13, 2017    Diagnosis/Indications:     Level II ultrasound--Includes consultation and fetal echo.  Genetic counseling is included.  U/S typically done at 19 week or greater    Normal quad screen     needed: No  Language: English                  Future tests that were ordered for you today     Open Future Orders        Priority Expected Expires Ordered    PERINATOLOGY REFERRAL Routine 4/26/2017 4/12/2018 4/12/2017            Who to contact     Please call your clinic at [phone] to:    Ask questions about your health    Make or cancel appointments    Discuss your medicines    Learn about your test results    Speak to your doctor   If you have compliments or concerns about an experience at your clinic, or if you wish to file a complaint, please contact [facility] Patient Relations at [phone] or email us at [email or web address]         Additional Information About Your Visit        MyChart Information   "   mPort gives you secure access to your electronic health record. If you see a primary care provider, you can also send messages to your care team and make appointments. If you have questions, please call your primary care clinic.  If you do not have a primary care provider, please call [phone] and they will assist you.      mPort is an electronic gateway that provides easy, online access to your medical records. With mPort, you can request a clinic appointment, read your test results, renew a prescription or communicate with your care team.     To access your existing account, please contact your [facility] or call [phone] for assistance.        Care EveryWhere ID     This is your Care EveryWhere ID. This could be used by other organizations to access your [facility] medical records  [id number]        Your Vitals Were     Pulse Temperature Height Last Period BMI (Body Mass Index)       66 97.7  F (36.5  C) (Oral) 5' 0.5\" (153.7 cm) 12/07/2016 (Exact Date) 24.97 kg/m2        Blood Pressure from Last 3 Encounters:   04/12/17 114/73   03/14/17 116/78   02/02/17 118/78    Weight from Last 3 Encounters:   04/12/17 130 lb (59 kg)   03/14/17 123 lb 12.8 oz (56.2 kg)   02/02/17 120 lb (54.4 kg)               Primary Care Provider Office Phone # Fax #    Nelia Ward [phone]       [facility] [address] [place] [address]        Thank you!     Thank you for choosing [facility]  for your care. Our goal is always to provide you with excellent care. Hearing back from our patients is one way we can continue to improve our services. Please take a few minutes to complete the written survey that you may receive in the mail after your visit with us. Thank you!             Your Updated Medication List - Protect others around you: Learn how to safely use, store and throw away your medicines at www.disposemymeds.org.          This list is " accurate as of: 4/12/17 11:53 AM.  Always use your most recent med list.                   Brand Name Dispense Instructions for use    prenatal multivitamin  plus iron 27-0.8 MG Tabs per tablet     100 tablet    Take 1 tablet by mouth daily       pyridOXINE 25 MG tablet    vitamin B-6    120 tablet    Take 1 tablet (25 mg) by mouth 4 times daily as needed

## 2017-04-12 NOTE — PROGRESS NOTES
Preceptor attestation:  Patient seen and discussed with the resident. Assessment and plan reviewed with resident and agreed upon.  Supervising physician: [facility]

## 2017-04-12 NOTE — PROGRESS NOTES
"Subjective  Concerns:   Overall feeling well. Does have HA occasionally. Mild and always goes away on its own. No change in vision. No swelling of hands or feet. Also some occasional lower abdominal cramps, no spotting.    Continues to take her prenatal vitamin, not needing any of her nausea medicines at this point.     Quad screen normal, requests level 2 US at 20 weeks. Plan to refer to perinatology for this.    Denies vaginal bleeding, LOF, abnormal vaginal discharge or dysuria. Fetal movement is normal.    Patient Active Problem List   Diagnosis     Supervision of high-risk pregnancy of elderly multigravida     AMA (advanced maternal age) multigravida 35+       Stephanie Garcia speaks English so an  was not used today.    Guidance:  signs of miscarriage  OTC medications  genetic testing  weight gain and exercise  quickening  fetal survey ultrasound    Objective  /73  Pulse 66  Temp 97.7  F (36.5  C) (Oral)  Ht 5' 0.5\" (153.7 cm)  Wt 130 lb (59 kg)  LMP 12/07/2016 (Exact Date)  BMI 24.97 kg/m2  No distress.  Gravid abdomen.  FHT 150s.  Fundal height 1 cm below the umbilicus.  no edema.    Results  Blood type: A POS  Results for orders placed or performed in visit on 03/23/17   AFP 4-Marker Scn ([facility])   Result Value Ref Range    Calculated Age At Partha 41 years     Patient Weight 123 lbs    Insulin Diabetic None     Race of Mother non-Black     Partha By Lmp 09/13/17     Ga On Collection By Dates 15,1 wk,d    Ga Used In Risk Estimate Dates estimate     Alpha Feto Protein 0.83 MoM ( 27.8 ng/mL )     Ue3 0.80 MoM ( 0.60 ng/mL )     Hcg, Total 0.92 MoM ( 43.5 IU/mL )     Inhibin 0.85 MoM ( 158 pg/mL )     Down Syndrome Screen Risk Estimate 1/700     Down Syndrome Maternal Age Risk 1/75     Trisomy 18 Screen Risk Estimate 1/2,900     Interpretation SEE BELOW     Recommended Follow Up None.     General Test Information SEE BELOW     Other Information Initial testing     Narrative    Test " performed by:  [facility]  [address] [place] [address]       Assessment & Plan  41 year old  at 18w0d with JAYDEN Sep 13, 2017 based on LMP    Stephanie was seen today for prenatal care.    Diagnoses and all orders for this visit:    Supervision of high-risk pregnancy of elderly multigravida  -     PERINATOLOGY REFERRAL; Future  Referral for level 2 US today. Discussed with pt that she should make this appt between 20-22 weeks.     Close attention to BP at next visit. Consider urine protein if any concerning features.    Weight gain adequate: 11 lb (4.99 kg) to date, out of recommended total of 15-25 lbs (pregravid BMI 25-29.9)    Return to clinic in 4 weeks.    Nelia Ward PGY-1  I precepted today with Alfredo Robles MD.

## 2017-04-28 ENCOUNTER — TRANSFERRED RECORDS (OUTPATIENT)
Dept: HEALTH INFORMATION MANAGEMENT | Facility: CLINIC | Age: 42
End: 2017-04-28

## 2017-05-08 ENCOUNTER — OFFICE VISIT (OUTPATIENT)
Dept: FAMILY MEDICINE | Facility: CLINIC | Age: 42
End: 2017-05-08

## 2017-05-08 VITALS
BODY MASS INDEX: 25.79 KG/M2 | HEART RATE: 75 BPM | DIASTOLIC BLOOD PRESSURE: 53 MMHG | SYSTOLIC BLOOD PRESSURE: 93 MMHG | TEMPERATURE: 98 F | WEIGHT: 136.6 LBS | HEIGHT: 61 IN

## 2017-05-08 DIAGNOSIS — O09.529 SUPERVISION OF HIGH-RISK PREGNANCY OF ELDERLY MULTIGRAVIDA: Primary | ICD-10-CM

## 2017-05-08 NOTE — PROGRESS NOTES
Preceptor attestation:  Patient seen and discussed with the resident. Assessment and plan reviewed with resident and agreed upon.  Supervising physician: Frantz Cleveland  [facility]    \

## 2017-05-08 NOTE — MR AVS SNAPSHOT
After Visit Summary   5/8/2017    Stephanie Garcia    MRN: [id number]           Patient Information     Date Of Birth          1975        Visit Information        Provider Department      5/8/2017 10:40 AM Nelia Ward MD [facility]        Care Instructions    You are 21 weeks, 5 days pregnant today!    For the leg cramping, try to drink more water, stretch before bed    Your work should provide you with a clean and private place to breast feed. I can write a doctor's letter for this if need be    We will do the diabetes screening test next time. This will take 1 hour     I would like to see you back in 4 weeks        Follow-ups after your visit        Who to contact     Please call your clinic at [phone] to:    Ask questions about your health    Make or cancel appointments    Discuss your medicines    Learn about your test results    Speak to your doctor   If you have compliments or concerns about an experience at your clinic, or if you wish to file a complaint, please contact [facility] Patient Relations at [phone] or email us at [email or web address]         Additional Information About Your Visit        MyChart Information     [employer] gives you secure access to your electronic health record. If you see a primary care provider, you can also send messages to your care team and make appointments. If you have questions, please call your primary care clinic.  If you do not have a primary care provider, please call [phone] and they will assist you.      [employer] is an electronic gateway that provides easy, online access to your medical records. With [employer], you can request a clinic appointment, read your test results, renew a prescription or communicate with your care team.     To access your existing account, please contact your [facility] or call [phone] for assistance.        Care EveryWhere ID   "   This is your Care EveryWhere ID. This could be used by other organizations to access your [place] medical records  [id number]        Your Vitals Were     Pulse Temperature Height Last Period BMI (Body Mass Index)       75 98  F (36.7  C) (Oral) 5' 0.5\" (153.7 cm) 12/07/2016 (Exact Date) 26.24 kg/m2        Blood Pressure from Last 3 Encounters:   05/08/17 93/53   04/12/17 114/73   03/14/17 116/78    Weight from Last 3 Encounters:   05/08/17 136 lb 9.6 oz (62 kg)   04/12/17 130 lb (59 kg)   03/14/17 123 lb 12.8 oz (56.2 kg)              Today, you had the following     No orders found for display       Primary Care Provider Office Phone # Fax #    Nelia Ward [phone]       [facility]  [address] [place] [address]        Thank you!     Thank you for choosing [facility]  for your care. Our goal is always to provide you with excellent care. Hearing back from our patients is one way we can continue to improve our services. Please take a few minutes to complete the written survey that you may receive in the mail after your visit with us. Thank you!             Your Updated Medication List - Protect others around you: Learn how to safely use, store and throw away your medicines at www.disposemymeds.org.          This list is accurate as of: 5/8/17 11:29 AM.  Always use your most recent med list.                   Brand Name Dispense Instructions for use    prenatal multivitamin  plus iron 27-0.8 MG Tabs per tablet     100 tablet    Take 1 tablet by mouth daily       pyridOXINE 25 MG tablet    vitamin B-6    120 tablet    Take 1 tablet (25 mg) by mouth 4 times daily as needed         "

## 2017-05-08 NOTE — PROGRESS NOTES
"Subjective  Concerns:   Notes one episode of leg cramping overnight. Not chronic.    Overall feeling well. Noticing more \"showing.\" Has had quickening.    Would like to breastfeed for 1 year, but unsure if the culture at her work will support this. Discussed [place] statute that they must provider her with a clean and private place to pump or breastfeed. Interested in prenatal classes, information was provided.      Discussed normal level 2 US.    Patient Active Problem List   Diagnosis     Supervision of high-risk pregnancy of elderly multigravida     AMA (advanced maternal age) multigravida 35+       Stephanie Garcia speaks English so an  was not used today.    Guidance:  weight gain and exercise  quickening  child birth education  fetal survey ultrasound    Objective  BP 93/53  Pulse 75  Temp 98  F (36.7  C) (Oral)  Ht 5' 0.5\" (153.7 cm)  Wt 136 lb 9.6 oz (62 kg)  LMP 2016 (Exact Date)  BMI 26.24 kg/m2  No distress.  Gravid abdomen.  FHT 140s.  Fundal height 21 cm.  no edema.    Results  Blood type: A POS  Results for orders placed or performed in visit on 17   AFP 4-Marker Scn ([facility])   Result Value Ref Range    Calculated Age At Partha 41 years     Patient Weight 123 lbs    Insulin Diabetic None     Race of Mother non-Black     Partha By Lmp 17     Ga On Collection By Dates 15,1 wk,d    Ga Used In Risk Estimate Dates estimate     Alpha Feto Protein 0.83 MoM ( 27.8 ng/mL )     Ue3 0.80 MoM ( 0.60 ng/mL )     Hcg, Total 0.92 MoM ( 43.5 IU/mL )     Inhibin 0.85 MoM ( 158 pg/mL )     Down Syndrome Screen Risk Estimate 1/700     Down Syndrome Maternal Age Risk 175     Trisomy 18 Screen Risk Estimate 1,900     Interpretation SEE BELOW     Recommended Follow Up None.     General Test Information SEE BELOW     Other Information Initial testing     Narrative    Test performed by:  [facility]  [address] [place] [address]       Assessment & Plan  41 year old  at " 21w5d with JAYDEN Sep 13, 2017 based on LMP    Stephanie was seen today for prenatal care.    Diagnoses and all orders for this visit:    Supervision of high-risk pregnancy of elderly multigravida    1-hour GTT at next visit    Weight gain adequate: 17 lb 9.6 oz (7.983 kg) to date, out of recommended total of 15-25 lbs (pregravid BMI 25-29.9)    Return to clinic in 4 weeks.    Nelia Ward PGY-1  I precepted today with Frantz Cleveland MD.

## 2017-05-08 NOTE — PATIENT INSTRUCTIONS
You are 21 weeks, 5 days pregnant today!    For the leg cramping, try to drink more water, stretch before bed    Your work should provide you with a clean and private place to breast feed. I can write a doctor's letter for this if need be    We will do the diabetes screening test next time. This will take 1 hour     I would like to see you back in 4 weeks

## 2017-06-09 DIAGNOSIS — O09.529 SUPERVISION OF HIGH-RISK PREGNANCY OF ELDERLY MULTIGRAVIDA: Primary | ICD-10-CM

## 2017-06-09 DIAGNOSIS — O09.529 SUPERVISION OF HIGH-RISK PREGNANCY OF ELDERLY MULTIGRAVIDA: ICD-10-CM

## 2017-06-09 LAB
GLU GEST SCREEN 1HR 50G: 80 (ref 0–129)
HEMOGLOBIN: 12.3 G/DL (ref 11.7–15.7)

## 2017-06-12 LAB — RPR SER QL: NORMAL

## 2017-06-14 ENCOUNTER — OFFICE VISIT (OUTPATIENT)
Dept: FAMILY MEDICINE | Facility: CLINIC | Age: 42
End: 2017-06-14

## 2017-06-14 VITALS
TEMPERATURE: 97.7 F | SYSTOLIC BLOOD PRESSURE: 96 MMHG | BODY MASS INDEX: 27.23 KG/M2 | DIASTOLIC BLOOD PRESSURE: 65 MMHG | HEIGHT: 61 IN | HEART RATE: 77 BPM | WEIGHT: 144.2 LBS

## 2017-06-14 DIAGNOSIS — O09.529 SUPERVISION OF HIGH-RISK PREGNANCY OF ELDERLY MULTIGRAVIDA: Primary | ICD-10-CM

## 2017-06-14 DIAGNOSIS — Z23 NEED FOR VACCINATION: ICD-10-CM

## 2017-06-14 NOTE — PATIENT INSTRUCTIONS
You are 27 weeks 0 days pregnant today!  - You received the TDAP vaccine to cover the baby for pertussis today. You should discuss with your  if he would also be willing to receive this vaccine  - Baby's size and heartbeat were normal today    - I would like to see you back in 4 weeks

## 2017-06-14 NOTE — PROGRESS NOTES
"Subjective  Concerns:   Endorses one episode of lightheadedness and dizziness, brief, pt sat down on the ground and this resolved spontaneously.    Also notes occasional leg cramps, especially at night.    Baby is moving normally. No vaginal bleeding, abnormal vaginal discharge, dysuria or LOF.    Not planning any travel this summer.    Have you travelled during the pregnancy?No  Have your sexual partner(s) travelled during the pregnancy?No    Risk Assessment   Average Risk Category  No significant risk factors: No    At Risk Category (up to 3)  Teen pregnancy: No  Poor social situation: No  Domestic abuse: No  Financial difficulties: No  Smoker: No  H/O  deliver: No  H/O drug abuse: No  Non-English speaking: No  Advanced maternal age: Yes  GDM risks: No  Previous C/S: No  H/O PIH: No  H/O STIs: No  H/O mental health concerns: No  Onset care > 20 weeks: No  Other: None    High Risk Category (4 or more At Risk or)  Diabetes/GDM: No  Multiple gestation: No  Chronic hypertension: No  Significant hx of asthma: No  Fetal demise > 20 weeks: No  Positive tox screen: No  Current mental health treatment: No  Other: none    Risk: At Risk   Date determined: 2017    Patient Active Problem List   Diagnosis     Supervision of high-risk pregnancy of elderly multigravida     AMA (advanced maternal age) multigravida 35+       Stephanie Garcia speaks English so an  was not used today.    Guidance:  signs of  labor    Objective  BP 96/65  Pulse 77  Temp 97.7  F (36.5  C) (Oral)  Ht 5' 0.5\" (153.7 cm)  Wt 144 lb 3.2 oz (65.4 kg)  LMP 2016 (Exact Date)  BMI 27.7 kg/m2  No distress.  Gravid abdomen.  .  Fundal height 26 cm.  no edema.    Results  Blood type: A POS  Results for orders placed or performed in visit on 17   Glucose Challenge  1 Hr  ([facility])   Result Value Ref Range    Glu Gest Screen 1hr 50g 80 0 - 129   Hemoglobin (HGB) ([facility])   Result Value Ref Range    Hemoglobin " 12.3 11.7 - 15.7 g/dL   Syphilis Screen [place] (R[facility]VDRL) [facility]   Result Value Ref Range    Syphilis Screen [place] Non-Reactive Non-Reactive    Narrative    Test performed by:  [facility] [address] [place] [address]       Assessment & Plan  41 year old  at 27w0d with JAYDEN Sep 13, 2017 based on LMP c/w 12 week US    Stephanie was seen today for prenatal care.    Diagnoses and all orders for this visit:    Supervision of high-risk pregnancy of elderly multigravida    Need for vaccination  -     ADMIN VACCINE, INITIAL  -     TDAP VACCINE ([facility]RIX)      Weight gain adequate: 25 lb 3.2 oz (11.4 kg) to date, out of recommended total of 25-35 lbs (pregravid BMI 18.5-24.9)    Return to clinic in 4 weeks.    Nelia Ward PGY-1  I precepted today with Alfredo Robles MD.

## 2017-06-14 NOTE — PROGRESS NOTES
Preceptor attestation:  Patient seen and discussed with the resident. Assessment and plan reviewed with resident and agreed upon.  Supervising physician: Alfredo Robles  [facility]

## 2017-06-14 NOTE — MR AVS SNAPSHOT
After Visit Summary   6/14/2017    Stephanie Garcia    MRN: [id number]           Patient Information     Date Of Birth          1975        Visit Information        Provider Department      6/14/2017 1:30 PM Nelia Ward MD [facility]        Today's Diagnoses     Supervision of high-risk pregnancy of elderly multigravida    -  1      Care Instructions    You are 27 weeks 0 days pregnant today!  - You received the TDAP vaccine to cover the baby for pertussis today. You should discuss with your  if he would also be willing to receive this vaccine  - Baby's size and heartbeat were normal today    - I would like to see you back in 4 weeks          Follow-ups after your visit        Who to contact     Please call your clinic at [phone] to:    Ask questions about your health    Make or cancel appointments    Discuss your medicines    Learn about your test results    Speak to your doctor   If you have compliments or concerns about an experience at your clinic, or if you wish to file a complaint, please contact [facility] Patient Relations at [phone] or email us at [email or web address]         Additional Information About Your Visit        MyChart Information     [facility] gives you secure access to your electronic health record. If you see a primary care provider, you can also send messages to your care team and make appointments. If you have questions, please call your primary care clinic.  If you do not have a primary care provider, please call [phone] and they will assist you.      fintonic is an electronic gateway that provides easy, online access to your medical records. With fintonic, you can request a clinic appointment, read your test results, renew a prescription or communicate with your care team.     To access your existing account, please contact your [facility] or call [phone] for  "assistance.        Care EveryWhere ID     This is your Care EveryWhere ID. This could be used by other organizations to access your [place] medical records  [id number]        Your Vitals Were     Pulse Temperature Height Last Period BMI (Body Mass Index)       77 97.7  F (36.5  C) (Oral) 5' 0.5\" (153.7 cm) 12/07/2016 (Exact Date) 27.7 kg/m2        Blood Pressure from Last 3 Encounters:   06/14/17 96/65   05/08/17 93/53   04/12/17 114/73    Weight from Last 3 Encounters:   06/14/17 144 lb 3.2 oz (65.4 kg)   05/08/17 136 lb 9.6 oz (62 kg)   04/12/17 130 lb (59 kg)              Today, you had the following     No orders found for display       Primary Care Provider Office Phone # Fax #    Nelia Ward [phone]       [facility]  [address] [place] [address]        Thank you!     Thank you for choosing [facility]  for your care. Our goal is always to provide you with excellent care. Hearing back from our patients is one way we can continue to improve our services. Please take a few minutes to complete the written survey that you may receive in the mail after your visit with us. Thank you!             Your Updated Medication List - Protect others around you: Learn how to safely use, store and throw away your medicines at www.disposemymeds.org.          This list is accurate as of: 6/14/17  2:01 PM.  Always use your most recent med list.                   Brand Name Dispense Instructions for use    prenatal multivitamin  plus iron 27-0.8 MG Tabs per tablet     100 tablet    Take 1 tablet by mouth daily       pyridOXINE 25 MG tablet    vitamin B-6    120 tablet    Take 1 tablet (25 mg) by mouth 4 times daily as needed         "

## 2017-07-11 ENCOUNTER — OFFICE VISIT (OUTPATIENT)
Dept: FAMILY MEDICINE | Facility: CLINIC | Age: 42
End: 2017-07-11

## 2017-07-11 VITALS
BODY MASS INDEX: 28.62 KG/M2 | OXYGEN SATURATION: 99 % | DIASTOLIC BLOOD PRESSURE: 88 MMHG | HEART RATE: 82 BPM | SYSTOLIC BLOOD PRESSURE: 107 MMHG | WEIGHT: 149 LBS

## 2017-07-11 DIAGNOSIS — O09.529 SUPERVISION OF HIGH-RISK PREGNANCY OF ELDERLY MULTIGRAVIDA: Primary | ICD-10-CM

## 2017-07-11 NOTE — PROGRESS NOTES
Preceptor attestation:  Patient seen and discussed with the resident. Assessment and plan reviewed with resident and agreed upon.  Supervising physician: Morales Kee  [facility]

## 2017-07-11 NOTE — PROGRESS NOTES
7/11/17 Faxed prescription for breast pump to [facility].  Breast pump will be delivered to pt's home./NG

## 2017-07-11 NOTE — PROGRESS NOTES
"Subjective  Concerns:   Overall feeling well. Does continue to have some trouble sleeping and occasional leg cramps. Notes rare Treutlen-Lange contractions. Feet and hands seem \"tighter,\" a bit swollen, but not extreme.    Denies HA, change in vision, RUQ pain, vaginal bleeding, abnormal vaginal discharge or LOF. Fetal movement is normal    STD risk screening   Have you used alcohol or illicit drugs?No    Do you have multiple sex partners? No    Have you had a sexually transmitted disease?No    Have you had syphilis?No    Have you had a sexual partner who is HIV positive?No    Have you travelled during the pregnancy?No  Have your sexual partner(s) travelled during the pregnancy?No     Patient Active Problem List   Diagnosis     Supervision of high-risk pregnancy of elderly multigravida     AMA (advanced maternal age) multigravida 35+       Stephanie Garcia speaks English so an  was not used today.    Guidance:  fetal growth and movement  signs of  labor    Do you need help getting a car seat? No  Do you need help getting a breast pump? YES    Objective  /88  Pulse 82  Wt 149 lb (67.6 kg)  LMP 2016 (Exact Date)  SpO2 99%  Breastfeeding? No  BMI 28.62 kg/m2  No distress.  Gravid abdomen.  .  Fundal height 30 cm.  no edema.    Results  Blood type: A POS  Results for orders placed or performed in visit on 17   Glucose Challenge  1 Hr  (P )   Result Value Ref Range    Glu Gest Screen 1hr 50g 80 0 - 129   Hemoglobin (HGB) ([facility])   Result Value Ref Range    Hemoglobin 12.3 11.7 - 15.7 g/dL   Syphilis Screen [place] (RPR/VDRL) ([facility])   Result Value Ref Range    Syphilis Screen [place] Non-Reactive Non-Reactive    Narrative    Test performed by:  [facility] [address] [place] [address]       Assessment & Plan  41 year old  at 30w6d with JAYDEN Sep 13, 2017 based on LMP c/w 12 week US    Stephanie was seen today for prenatal care.    Diagnoses " and all orders for this visit:    Supervision of high-risk pregnancy of elderly multigravida  -     order for DME; Double electric breast pump      Weight gain adequate: 30 lb (13.6 kg) to date, out of recommended total of 25-35 lbs (pregravid BMI 18.5-24.9)    Return to clinic in 3 weeks.    Nelia Ward PGY-2  I precepted today with Morales Kee MD.

## 2017-07-11 NOTE — PROGRESS NOTES
Correction: 7/11/17 Requested breast pump for pt online through [facility].  They will contact pt once they are ready to deliver./NG

## 2017-07-11 NOTE — PATIENT INSTRUCTIONS
You are 30 weeks and 6 days pregnant today!  - Breast pump prescription  - Group B strep test at the next visit  - Regular contractions, vaginal bleeding, loss of fluid or decrease in fetal movement are all reasons to call the [facility] at [facility]     I would like to see you back in 3 weeks

## 2017-07-11 NOTE — MR AVS SNAPSHOT
After Visit Summary   7/11/2017    Stephanie Garcia    MRN: [id number]           Patient Information     Date Of Birth          1975        Visit Information        Provider Department      7/11/2017 10:40 AM Nelia Ward MD [facility]        Care Instructions    You are 30 weeks and 6 days pregnant today!  - Breast pump prescription  - Group B strep test at the next visit  - Regular contractions, vaginal bleeding, loss of fluid or decrease in fetal movement are all reasons to call the [facility] at [facility]     I would like to see you back in 3 weeks          Follow-ups after your visit        Who to contact     Please call your clinic at [phone] to:    Ask questions about your health    Make or cancel appointments    Discuss your medicines    Learn about your test results    Speak to your doctor   If you have compliments or concerns about an experience at your clinic, or if you wish to file a complaint, please contact [facility] Patient Relations at [phone] or email us at [email or web address]         Additional Information About Your Visit        MyChart Information     Incuboom gives you secure access to your electronic health record. If you see a primary care provider, you can also send messages to your care team and make appointments. If you have questions, please call your primary care clinic.  If you do not have a primary care provider, please call [phone] and they will assist you.      Incuboom is an electronic gateway that provides easy, online access to your medical records. With Incuboom, you can request a clinic appointment, read your test results, renew a prescription or communicate with your care team.     To access your existing account, please contact your [facility] or call [phone] for assistance.        Care EveryWhere ID     This is your Care EveryWhere ID.  This could be used by other organizations to access your [place] medical records  [id number]        Your Vitals Were     Pulse Last Period Pulse Oximetry Breastfeeding? BMI (Body Mass Index)       82 12/07/2016 (Exact Date) 99% No 28.62 kg/m2        Blood Pressure from Last 3 Encounters:   07/11/17 107/88   06/14/17 96/65   05/08/17 93/53    Weight from Last 3 Encounters:   07/11/17 149 lb (67.6 kg)   06/14/17 144 lb 3.2 oz (65.4 kg)   05/08/17 136 lb 9.6 oz (62 kg)              Today, you had the following     No orders found for display       Primary Care Provider Office Phone # Fax #    Nelia Paige Ward [phone]       [facility]  [address] [place] [address]        Equal Access to Services     JOSE PEREIRA : Hadii chhaya acosta Soanup, waaxda luqadaha, qaybta kaalmada onur, stuart kunz . So [facility] [phone].    ATENCIÓN: Si habla español, tiene a darling disposición servicios gratuitos de asistencia lingüística. Llame al [phone].    We comply with applicable federal civil rights laws and [place] laws. We do not discriminate on the basis of race, color, national origin, age, disability sex, sexual orientation or gender identity.            Thank you!     Thank you for choosing [facility]  for your care. Our goal is always to provide you with excellent care. Hearing back from our patients is one way we can continue to improve our services. Please take a few minutes to complete the written survey that you may receive in the mail after your visit with us. Thank you!             Your Updated Medication List - Protect others around you: Learn how to safely use, store and throw away your medicines at www.disposemymeds.org.          This list is accurate as of: 7/11/17 11:14 AM.  Always use your most recent med list.                   Brand Name Dispense Instructions for use Diagnosis    prenatal multivitamin  plus iron 27-0.8 MG Tabs  per tablet     100 tablet    Take 1 tablet by mouth daily    Pregnancy test positive

## 2017-07-24 ENCOUNTER — TELEPHONE (OUTPATIENT)
Dept: FAMILY MEDICINE | Facility: CLINIC | Age: 42
End: 2017-07-24

## 2017-07-24 ENCOUNTER — HOSPITAL ENCOUNTER (OUTPATIENT)
Dept: ADMINISTRATIVE | Facility: OTHER | Age: 42
Discharge: HOME | End: 2017-07-24
Attending: FAMILY MEDICINE | Admitting: FAMILY MEDICINE

## 2017-07-24 NOTE — TELEPHONE ENCOUNTER
Received answering service call from Stephanie Garcia.  Patient is a 41-year-old  at 32 weeks 5 days gestation who called answering service concerned that she woke up and her sheets were wet.  She would like to be evaluated to rule out rupture of membranes.  She states that fetal movement is normal.  She has no vaginal bleeding.  Not miles.  She has had no more leakage of fluid.  She states that she thinks fluid smells like urine.    Discussed the importance of being evaluated today.  Discussed several options and patient elected to present to the [facility] maternity care center for rule out of rupture of membranes.  Questions were answered.    Alfredo Almaraz DO PGY-3  [facility]   Pager: [phone]

## 2017-08-11 ENCOUNTER — OFFICE VISIT (OUTPATIENT)
Dept: FAMILY MEDICINE | Facility: CLINIC | Age: 42
End: 2017-08-11

## 2017-08-11 ENCOUNTER — TELEPHONE (OUTPATIENT)
Dept: FAMILY MEDICINE | Facility: CLINIC | Age: 42
End: 2017-08-11

## 2017-08-11 VITALS
HEIGHT: 61 IN | SYSTOLIC BLOOD PRESSURE: 108 MMHG | WEIGHT: 154.8 LBS | BODY MASS INDEX: 29.23 KG/M2 | TEMPERATURE: 97.5 F | DIASTOLIC BLOOD PRESSURE: 60 MMHG | HEART RATE: 71 BPM

## 2017-08-11 DIAGNOSIS — O09.529 SUPERVISION OF HIGH-RISK PREGNANCY OF ELDERLY MULTIGRAVIDA: Primary | ICD-10-CM

## 2017-08-11 NOTE — MR AVS SNAPSHOT
After Visit Summary   8/11/2017    Stephanie Garcia    MRN: [id number]           Patient Information     Date Of Birth          1975        Visit Information        Provider Department      8/11/2017 1:50 PM Nelia Ward MD [facility]        Today's Diagnoses     Supervision of high-risk pregnancy of elderly multigravida    -  1      Care Instructions    You are 35 weeks and 2 days pregnant today!    We collected group B strep swab today. We will talk about results at the next visit    I would like to see you back in 2 weeks          Follow-ups after your visit        Who to contact     Please call your clinic at [phone] to:    Ask questions about your health    Make or cancel appointments    Discuss your medicines    Learn about your test results    Speak to your doctor   If you have compliments or concerns about an experience at your clinic, or if you wish to file a complaint, please contact [facility] Patient Relations at [phone] or email us at [email or web address]         Additional Information About Your Visit        MyChart Information     Vyclonet gives you secure access to your electronic health record. If you see a primary care provider, you can also send messages to your care team and make appointments. If you have questions, please call your primary care clinic.  If you do not have a primary care provider, please call [phone] and they will assist you.      TripShake is an electronic gateway that provides easy, online access to your medical records. With TripShake, you can request a clinic appointment, read your test results, renew a prescription or communicate with your care team.     To access your existing account, please contact your [facility] or call [phone] for assistance.        Care EveryWhere ID     This is your Care EveryWhere ID. This could be used by other organizations to access  "your [place] medical records  [id number]        Your Vitals Were     Pulse Temperature Height Last Period BMI (Body Mass Index)       71 97.5  F (36.4  C) (Oral) 5' 0.5\" (153.7 cm) 12/07/2016 (Exact Date) 29.73 kg/m2        Blood Pressure from Last 3 Encounters:   08/11/17 108/60   07/11/17 107/88   06/14/17 96/65    Weight from Last 3 Encounters:   08/11/17 154 lb 12.8 oz (70.2 kg)   07/11/17 149 lb (67.6 kg)   06/14/17 144 lb 3.2 oz (65.4 kg)              Today, you had the following     No orders found for display       Primary Care Provider Office Phone # Fax #    Nelia Ward [phone]       [facility]  [address] [place] [address]        Equal Access to Services     SARAI PEREIRA : Hadii chhaya lama hadasho Soanup, waaxda luqadaha, qaybta kaalmada adeegyada, waxmaricruz lubinin haylenyn haider kunz . So [facility] [phone].    ATENCIÓN: Si habla español, tiene a darling disposición servicios gratuitos de asistencia lingüística. Monica al [phone].    We comply with applicable federal civil rights laws and [place] laws. We do not discriminate on the basis of race, color, national origin, age, disability sex, sexual orientation or gender identity.            Thank you!     Thank you for choosing [facility]  for your care. Our goal is always to provide you with excellent care. Hearing back from our patients is one way we can continue to improve our services. Please take a few minutes to complete the written survey that you may receive in the mail after your visit with us. Thank you!             Your Updated Medication List - Protect others around you: Learn how to safely use, store and throw away your medicines at www.disposemymeds.org.          This list is accurate as of: 8/11/17  3:02 PM.  Always use your most recent med list.                   Brand Name Dispense Instructions for use Diagnosis    order for DME     1 pump    Double electric breast pump    Supervision " of high-risk pregnancy of elderly multigravida       prenatal multivitamin plus iron 27-0.8 MG Tabs per tablet     100 tablet    Take 1 tablet by mouth daily    Pregnancy test positive

## 2017-08-11 NOTE — PROGRESS NOTES
Preceptor attestation:  Patient seen and discussed with the resident. Assessment and plan reviewed with resident and agreed upon.  Supervising physician: Ramo Birch  [facility]

## 2017-08-11 NOTE — PATIENT INSTRUCTIONS
You are 35 weeks and 2 days pregnant today!    We collected group B strep swab today. We will talk about results at the next visit    I would like to see you back in 2 weeks

## 2017-08-11 NOTE — TELEPHONE ENCOUNTER
[facility] at [facility] to check to see when breast pump will be delivered.  The request for breast pump was done online on 7/11/17 and pt has not heard yet./NG

## 2017-08-11 NOTE — PROGRESS NOTES
"    Subjective  Concerns: No acute questions/concerns today. She continues to have intermittent Alber-Geoff contractions at a slightly increased frequency of ~6-8/day. On 07/24 she called in w/ concern for ROM, she was seen at that time and was determined to have been an episode of nocturnal incontinence. She reports this happened 1x since.     Headache absent, Changes in vision absent, Chest pain absent, Dyspnea occasional mild sob in morning. No cough. Nausea absent without vomiting. Abdominal pain mild w/ lying on back and turning. Contractions intermittnet alber-geoff. Dysuria absent, Vaginal Discharge absent, Vaginal bleeding absent.  Loss of Fluid absent   Extremity swelling present - trace.   Fetal movement present - yvette in early morning and after meals.    All her questions about labor/ signs of labor were answered today.     Zika Screening  Have you travelled during the pregnancy?No  Have your sexual partner(s) travelled during the pregnancy?No    Patient Active Problem List   Diagnosis     Supervision of high-risk pregnancy of elderly multigravida     AMA (advanced maternal age) multigravida 35+       Stephanie Garcia speaks English so an  was not used today.    Guidance:  birth control  travel  warning signs/PIH    Do you need help getting a car seat? No  Do you need help getting a breast pump? YES, has talked w/ Antonella w/ plan       ObjectiveBP 108/60 (BP Location: Left arm, Patient Position: Sitting, Cuff Size: Adult Regular)  Pulse 71  Temp 97.5  F (36.4  C) (Oral)  Ht 5' 0.5\" (153.7 cm)  Wt 154 lb 12.8 oz (70.2 kg)  LMP 12/07/2016 (Exact Date)  BMI 29.73 kg/m2  No distress.  Gravid abdomen.  FHT 125bpm.  Fundal height 36 cm.  trace edema.    Results  Blood type: A POS  Results for orders placed or performed in visit on 08/11/17   Culture Grp B Screen ([facility])   Result Value Ref Range    Group B Strep Antigen Negative Negative    Allergic To Penicillin No     Narrative    Test " performed by:  [facility]  [address] [place] [address]  Intended Use:  The illumigene Group B Streptococcus (GBS) DNA amplification assay is a   qualitative in-vitro diagnostic for the detection of Streptococcus agalactiae   in enriched cultures obtained from vaginal/rectal swabs from antepartum women.    Results from this assay can be used as an aide in establishing the GBS   colonization status of antepartum women.  This assay does not diagnose or   monitor treatment for GBS infections.  The illumigene GBS assay is intended   for use in hospital, reference or state laboratory settings.  The device is   not intended for point-of-care use.  Methodology:  The illumigene GBS molecular assay is based on loop mediated amplification   technology, which uses specifically designed primers to Streptococcus   agalactiae to provide for specific and continuous isothermal DNA   amplification.  A by-product of this amplification is magnesium pyrophosphate,   which forms a white precipitate leading to a turbid reaction solution.    Change in sample absorbance indicates the presence of GBS and is reported as   positive.  The absence of target DNA results is no detectable change in   absorbance and is reported as negative.  The illumigene GBS assay specifically   targets a highly conserved 213 base pair sequence of the Streptococcus   agalactiae genome.       Assessment & Plan  41 year old  at 35w2d with JAYDEN Sep 13, 2017 based on LMP c/w 10 week US. She has been followed consistently w/ an uncomplicated pregnancy only notable for advance maternal age. Is doing well today w/o acute concerns/ questions.   - GBS collected today    Stephanie was seen today for prenatal care.    Diagnoses and all orders for this visit:    Supervision of high-risk pregnancy of elderly multigravida  -     Culture Grp B Screen ([facility])      Weight gain adequate: 35 lb 12.8 oz (16.2 kg) to date, out of recommended total of 25-35  lbs (pregravid BMI 18.5-24.9)    Patient Instructions   You are 35 weeks and 2 days pregnant today!    We collected group B strep swab today. We will talk about results at the next visit    I would like to see you back in 2 weeks      Return to clinic in 2 weeks.    Pedro Lovett, MS4  Scribe Disclosure:   I, Pedro Lovett, am serving as a scribe; to document services personally performed by Sylvia -based on data collection and the provider's statements to me.     Nelia Ward MD  I precepted today with Ramo Birch MD.

## 2017-08-12 LAB
ALLERGIC TO PENICILLIN: NO
GP B STREP AG SPEC QL LA: NEGATIVE

## 2017-08-15 ENCOUNTER — TELEPHONE (OUTPATIENT)
Dept: FAMILY MEDICINE | Facility: CLINIC | Age: 42
End: 2017-08-15

## 2017-08-15 NOTE — TELEPHONE ENCOUNTER
Spoke with staff at [facility] and [employer] does not allow breast pump prescription to be filled until after delivery of baby.    Gave info to pt and gave her he number for [facility] to call once baby has delivered.  Told her that if she wanted to she could instead get the breast pump from the hospital after she delivers before she goes home.  Pt verbalized understanding./NG

## 2017-08-15 NOTE — TELEPHONE ENCOUNTER
[facility] phone call message- general phone call:    Reason for call: Pt should receive breast pump by next week.    Return call needed: Yes    OK to leave a message on voice mail? Yes    Primary language: English      needed? No    Call taken on August 15, 2017 at 10:11 AM by Maisha Mccoy

## 2017-08-29 ENCOUNTER — OFFICE VISIT (OUTPATIENT)
Dept: FAMILY MEDICINE | Facility: CLINIC | Age: 42
End: 2017-08-29

## 2017-08-29 DIAGNOSIS — O09.529 SUPERVISION OF HIGH-RISK PREGNANCY OF ELDERLY MULTIGRAVIDA: Primary | ICD-10-CM

## 2017-08-29 NOTE — PROGRESS NOTES
Preceptor attestation:  Vital signs reviewed: /72  Pulse 78  Temp 97.9  F (36.6  C) (Oral)  Wt 160 lb 6.4 oz (72.8 kg)  LMP 12/07/2016 (Exact Date)  BMI 30.81 kg/m2    Patient seen and discussed with the resident. Assessment and plan reviewed with resident and agreed upon.    Supervising physician: Katarina Anguiano MD  [facility]

## 2017-08-29 NOTE — PROGRESS NOTES
Subjective  Concerns:   Feeling well. Noticing more [place] contractions. No vaginal spotting/bleeding, abnormal discharge or LOF. Denies HA, change in vision, RUQ pain or diffuse edema. Normal fetal movement.    Patient Active Problem List   Diagnosis     Supervision of high-risk pregnancy of elderly multigravida     AMA (advanced maternal age) multigravida 35+       Stephanie Garcia speaks English so an  was not used today.    Guidance:  GBS  signs of labor    Do you need help getting a car seat? No  Do you need help getting a breast pump? Ordered, awaiting arrival    Objective  /72  Pulse 78  Temp 97.9  F (36.6  C) (Oral)  Wt 160 lb 6.4 oz (72.8 kg)  LMP 12/07/2016 (Exact Date)  BMI 30.81 kg/m2  No distress.  Gravid abdomen.  .  Fundal height 37 cm.  No edema. Cephalic by BSUS.  Cervix: patient declined exam. Plan to check cervix next week    Results  Blood type: A POS  Results for orders placed or performed in visit on 08/11/17   Culture Grp B Screen ([facility])   Result Value Ref Range    Group B Strep Antigen Negative Negative    Allergic To Penicillin No     Narrative    Test performed by:  [facility] [address] [place] [address]  Intended Use:  The SCOUPYigene Group B Streptococcus (GBS) DNA amplification assay is a   qualitative in-vitro diagnostic for the detection of Streptococcus agalactiae   in enriched cultures obtained from vaginal/rectal swabs from antepartum women.    Results from this assay can be used as an aide in establishing the GBS   colonization status of antepartum women.  This assay does not diagnose or   monitor treatment for GBS infections.  The illumigene GBS assay is intended   for use in hospital, reference or state laboratory settings.  The device is   not intended for point-of-care use.  Methodology:  The SCOUPYigene GBS molecular assay is based on loop mediated amplification   technology, which uses specifically designed  primers to Streptococcus   agalactiae to provide for specific and continuous isothermal DNA   amplification.  A by-product of this amplification is magnesium pyrophosphate,   which forms a white precipitate leading to a turbid reaction solution.    Change in sample absorbance indicates the presence of GBS and is reported as   positive.  The absence of target DNA results is no detectable change in   absorbance and is reported as negative.  The illumigene GBS assay specifically   targets a highly conserved 213 base pair sequence of the Streptococcus   agalactiae genome.       Assessment & Plan  41 year old  at 37w6d with JAYDEN Sep 13, 2017 based on LMP c/w 12w US    Stephanie was seen today for recheck.    Diagnoses and all orders for this visit:    Supervision of high-risk pregnancy of elderly multigravida      Weight gain adequate: 41 lb 6.4 oz (18.8 kg) to date, out of recommended total of 15-25 lbs (pregravid BMI 25-29.9)    Patient Instructions   You are 37 weeks and 6 days pregnant today!    I would like to see you back in 1 week          Return to clinic in 1 week.    Nelia Ward MD  I precepted today with Katarina Anguiano MD.

## 2017-08-29 NOTE — MR AVS SNAPSHOT
After Visit Summary   8/29/2017    Stephanie Garcia    MRN: [id number]           Patient Information     Date Of Birth          1975        Visit Information        Provider Department      8/29/2017 1:50 PM Nelia Ward MD [facility]        Today's Diagnoses     Supervision of high-risk pregnancy of elderly multigravida    -  1      Care Instructions    You are 37 weeks and 6 days pregnant today!    I would like to see you back in 1 week              Follow-ups after your visit        Who to contact     Please call your clinic at [phone] to:    Ask questions about your health    Make or cancel appointments    Discuss your medicines    Learn about your test results    Speak to your doctor   If you have compliments or concerns about an experience at your clinic, or if you wish to file a complaint, please contact [facility] Patient Relations at [phone] or email us at [email or web address]         Additional Information About Your Visit        MyChart Information     [email or web address] gives you secure access to your electronic health record. If you see a primary care provider, you can also send messages to your care team and make appointments. If you have questions, please call your primary care clinic.  If you do not have a primary care provider, please call [phone] and they will assist you.      Souche is an electronic gateway that provides easy, online access to your medical records. With [employer]che, you can request a clinic appointment, read your test results, renew a prescription or communicate with your care team.     To access your existing account, please contact your [facility] or call [phone] for assistance.        Care EveryWhere ID     This is your Care EveryWhere ID. This could be used by other organizations to access your [facility] medical records  [id number]        Your Vitals Were     Pulse  Temperature Last Period BMI (Body Mass Index)          78 97.9  F (36.6  C) (Oral) 12/07/2016 (Exact Date) 30.81 kg/m2         Blood Pressure from Last 3 Encounters:   08/29/17 114/72   08/11/17 108/60   07/11/17 107/88    Weight from Last 3 Encounters:   08/29/17 160 lb 6.4 oz (72.8 kg)   08/11/17 154 lb 12.8 oz (70.2 kg)   07/11/17 149 lb (67.6 kg)              Today, you had the following     No orders found for display       Primary Care Provider Office Phone # Fax #    Nelia Ward [phone]       [facility]  [address] [place] [address]        Equal Access to Services     JOSE PEREIRA : Yanelis acosta Soanup, waaxda luqadaha, qaybta kaalmada aderadhayaradha, stuart kunz . So [facility] [phone].    ATENCIÓN: Si habla español, tiene a darling disposición servicios gratuitos de asistencia lingüística. Llame al [phone].    We comply with applicable federal civil rights laws and [place] laws. We do not discriminate on the basis of race, color, national origin, age, disability sex, sexual orientation or gender identity.            Thank you!     Thank you for choosing [facility]  for your care. Our goal is always to provide you with excellent care. Hearing back from our patients is one way we can continue to improve our services. Please take a few minutes to complete the written survey that you may receive in the mail after your visit with us. Thank you!             Your Updated Medication List - Protect others around you: Learn how to safely use, store and throw away your medicines at www.disposemymeds.org.          This list is accurate as of: 8/29/17  2:29 PM.  Always use your most recent med list.                   Brand Name Dispense Instructions for use Diagnosis    order for DME     1 pump    Double electric breast pump    Supervision of high-risk pregnancy of elderly multigravida       prenatal multivitamin plus iron 27-0.8 MG Tabs  per tablet     100 tablet    Take 1 tablet by mouth daily    Pregnancy test positive

## 2017-09-03 ENCOUNTER — COMMUNICATION - HEALTHEAST (OUTPATIENT)
Dept: ADMINISTRATIVE | Facility: OTHER | Age: 42
End: 2017-09-03

## 2017-09-07 VITALS
WEIGHT: 160.4 LBS | HEART RATE: 78 BPM | TEMPERATURE: 97.9 F | DIASTOLIC BLOOD PRESSURE: 72 MMHG | BODY MASS INDEX: 30.81 KG/M2 | SYSTOLIC BLOOD PRESSURE: 114 MMHG

## 2017-09-07 PROBLEM — O09.529 AMA (ADVANCED MATERNAL AGE) MULTIGRAVIDA 35+: Status: RESOLVED | Noted: 2017-02-02 | Resolved: 2017-09-07

## 2017-10-26 ENCOUNTER — OFFICE VISIT (OUTPATIENT)
Dept: FAMILY MEDICINE | Facility: CLINIC | Age: 42
End: 2017-10-26

## 2017-10-26 VITALS
OXYGEN SATURATION: 98 % | WEIGHT: 146.5 LBS | SYSTOLIC BLOOD PRESSURE: 136 MMHG | TEMPERATURE: 98 F | HEART RATE: 65 BPM | DIASTOLIC BLOOD PRESSURE: 85 MMHG | BODY MASS INDEX: 27.66 KG/M2 | HEIGHT: 61 IN

## 2017-10-26 DIAGNOSIS — Z23 NEED FOR PROPHYLACTIC VACCINATION AND INOCULATION AGAINST INFLUENZA: Primary | ICD-10-CM

## 2017-10-26 DIAGNOSIS — Z30.013 ENCOUNTER FOR INITIAL PRESCRIPTION OF INJECTABLE CONTRACEPTIVE: ICD-10-CM

## 2017-10-26 RX ORDER — MEDROXYPROGESTERONE ACETATE 150 MG/ML
150 INJECTION, SUSPENSION INTRAMUSCULAR
Qty: 0.9 ML | Refills: 0 | OUTPATIENT
Start: 2017-10-26 | End: 2019-02-06

## 2017-10-26 RX ORDER — PRENATAL VIT/IRON FUM/FOLIC AC 27MG-0.8MG
1 TABLET ORAL DAILY
Qty: 100 TABLET | Refills: 3 | Status: SHIPPED | OUTPATIENT
Start: 2017-10-26 | End: 2019-02-06

## 2017-10-26 ASSESSMENT — PATIENT HEALTH QUESTIONNAIRE - PHQ9: SUM OF ALL RESPONSES TO PHQ QUESTIONS 1-9: 7

## 2017-10-26 NOTE — PATIENT INSTRUCTIONS
Please make another appointment in 3 months for repeat depo shot OR transition to a longer-term contraception at this time  The website [email or web address] has some information on different drugs during lactation  You can always call the clinic and ask questions about specific medications    Medroxyprogesterone injection [Contraceptive]  Brand Names: Depo-Provera, Depo-subQ Provera 104  What is this medicine?  MEDROXYPROGESTERONE (me DROX ee proe HENRIQUE te patricia) contraceptive injections prevent pregnancy. They provide effective birth control for 3 months. Depo-subQ Provera 104 is also used for treating pain related to endometriosis.  How should I use this medicine?  Depo-Provera Contraceptive injection is given into a muscle. Depo-subQ Provera 104 injection is given under the skin. These injections are given by a health care professional. You must not be pregnant before getting an injection. The injection is usually given during the first 5 days after the start of a menstrual period or 6 weeks after delivery of a baby.  Talk to your pediatrician regarding the use of this medicine in children. Special care may be needed. These injections have been used in female children who have started having menstrual periods.  What side effects may I notice from receiving this medicine?  Side effects that you should report to your doctor or health care professional as soon as possible:    allergic reactions like skin rash, itching or hives, swelling of the face, lips, or tongue    breast tenderness or discharge    breathing problems    changes in vision    depression    feeling faint or lightheaded, falls    fever    pain in the abdomen, chest, groin, or leg    problems with balance, talking, walking    unusually weak or tired    yellowing of the eyes or skin  Side effects that usually do not require medical attention (report to your doctor or health care professional if they continue or are bothersome):     acne    fluid retention and swelling    headache    irregular periods, spotting, or absent periods    temporary pain, itching, or skin reaction at site where injected    weight gain  What may interact with this medicine?  Do not take this medicine with any of the following medications:    bosentan  This medicine may also interact with the following medications:    aminoglutethimide    antibiotics or medicines for infections, especially rifampin, rifabutin, rifapentine, and griseofulvin    aprepitant    barbiturate medicines such as phenobarbital or primidone    bexarotene    carbamazepine    medicines for seizures like ethotoin, felbamate, oxcarbazepine, phenytoin, topiramate    modafinil    [place]'s wort  What if I miss a dose?  Try not to miss a dose. You must get an injection once every 3 months to maintain birth control. If you cannot keep an appointment, call and reschedule it. If you wait longer than 13 weeks between Depo-Provera contraceptive injections or longer than 14 weeks between Depo-subQ Provera 104 injections, you could get pregnant. Use another method for birth control if you miss your appointment. You may also need a pregnancy test before receiving another injection.  Where should I keep my medicine?  This does not apply. The injection will be given to you by a health care professional.  What should I tell my health care provider before I take this medicine?  They need to know if you have any of these conditions:    frequently drink alcohol    asthma    blood vessel disease or a history of a blood clot in the lungs or legs    bone disease such as osteoporosis    breast cancer    diabetes    eating disorder (anorexia nervosa or bulimia)    high blood pressure    HIV infection or AIDS    kidney disease    liver disease    mental depression    migraine    seizures (convulsions)    stroke    tobacco smoker    vaginal bleeding    an unusual or allergic reaction to medroxyprogesterone, other hormones,  medicines, foods, dyes, or preservatives    pregnant or trying to get pregnant    breast-feeding  What should I watch for while using this medicine?  This drug does not protect you against HIV infection (AIDS) or other sexually transmitted diseases.  Use of this product may cause you to lose calcium from your bones. Loss of calcium may cause weak bones (osteoporosis). Only use this product for more than 2 years if other forms of birth control are not right for you. The longer you use this product for birth control the more likely you will be at risk for weak bones. Ask your health care professional how you can keep strong bones.  You may have a change in bleeding pattern or irregular periods. Many females stop having periods while taking this drug.  If you have received your injections on time, your chance of being pregnant is very low. If you think you may be pregnant, see your health care professional as soon as possible.  Tell your health care professional if you want to get pregnant within the next year. The effect of this medicine may last a long time after you get your last injection.  NOTE:This sheet is a summary. It may not cover all possible information. If you have questions about this medicine, talk to your doctor, pharmacist, or health care provider. Copyright  2017 Elsevier

## 2017-10-26 NOTE — PROGRESS NOTES
"       SUBJECTIVE       Stephanie Garcia is a 42 year old  female with a PMH significant for:     Patient Active Problem List   Diagnosis   (none) - all problems resolved or deleted     She presents for routine post-partum follow-up. Generally things have been going well. Does note significant fatigue as she is still getting up frequently at night with the baby. She is mostly breast feeding, some bottle feeding. Was a little unsure how this was going at first, but baby boy has been growing well. Has had a menstrual cycle since delivery, in addition to one episode of spotting. Denies vaginal or abdominal pain or unusual discharge. Mood is good.     Interested in long-term contraception. Sure that she and her  do not desire more children. No intercourse since the baby was born.         REVIEW OF SYSTEMS     See HPI        OBJECTIVE     Vitals:    10/26/17 1603   BP: 136/85   Pulse: 65   Temp: 98  F (36.7  C)   TempSrc: Oral   SpO2: 98%   Weight: 146 lb 8 oz (66.5 kg)   Height: 5' 0.51\" (153.7 cm)     Body mass index is 28.13 kg/(m^2).    Gen: Well-appearing adult female. Alert, oriented and appropriate. NAD  HEENT: MMM  CV: Appears well-perfused. No LE edema  Pulm: Breathing comfortably on room air  Abdomen: Soft, non-tender, non-distended. Uterine fundus appropriate, non-tender    No results found for this or any previous visit (from the past 24 hour(s)).        ASSESSMENT AND PLAN     1. Need for prophylactic vaccination and inoculation against influenza  - ADMIN VACCINE, INITIAL  - FLU VAC QUADRIVLENT SPLIT VIRUS IM 0.5ml dosage    2. Encounter for initial prescription of injectable contraceptive  Long discussion today regarding contraceptive options. Pt would like to discuss long-term vs permanent options with her  before making a decision. Will give depo shot today. No UPT needed as no intercourse since delivery.   - medroxyPROGESTERone (DEPO-PROVERA) 150 MG/ML injection; Inject 1 mL (150 mg) into " the muscle every 3 months  Dispense: 0.9 mL; Refill: 0  - INJECTION INTRAMUSCULAR OR SUB-Q  - medroxyPROGESTERone (DEPO-PROVERA) injection 150 mg (Charge)  - Nurse visit in 3 months for repeat depo shot vs. Other contraceptive option    3. Routine postpartum follow-up  Doing well in the post-partum period. PHQ-9 of 7, highest score for fatigue.   - Repeat PHQ-9 at next visit      Nelia Ward

## 2017-10-26 NOTE — NURSING NOTE
The following medication was given:     MEDICATION: Depo Provera 150mg  ROUTE: IM  [facility]  DOSE: 150 mg  LOT #: [id number]  :  Vennli   EXPIRATION DATE:  01/2022  NDC#: [address]-2   Medication administered by: Elena Burgos CMA  Reminder card given to return between January 11 - February 8, 2018 for her next depo.  Dr. Birch was available on site at the time of this service.

## 2017-10-26 NOTE — NURSING NOTE
"Injectable Influenza Immunization Documentation    1.  Has the patient received the information for the injectable influenza vaccine? YES     2. Is the patient 6 months of age or older? YES     3. Does the patient have any of the following contraindications?         Severe allergy to eggs? No     Severe allergic reaction to previous influenza vaccines? No   Severe allergy to latex? No       History of Guillain-Wallagrass syndrome? No     Currently have a temperature greater than 100.4F? No        4.  Severely egg allergic patients should have flu vaccine eligibility assessed by an MD, RN, or pharmacist, and those who received flu vaccine should be observed for 15 min by an MD, RN, Pharmacist, Medical Technician, or member of clinic staff.\": YES    5. Latex-allergic patients should be given latex-free influenza vaccine Yes. Please reference the Vaccine latex table to determine if your clinic s product is latex-containing.       Vaccination given by Elena Burgos CMA        "

## 2017-10-26 NOTE — MR AVS SNAPSHOT
After Visit Summary   10/26/2017    Stephanie Garcia    MRN: [id number]           Patient Information     Date Of Birth          1975        Visit Information        Provider Department      10/26/2017 4:10 PM Nelia Ward MD [facility]        Today's Diagnoses     Need for prophylactic vaccination and inoculation against influenza    -  1    Encounter for initial prescription of injectable contraceptive          Care Instructions    Please make another appointment in 3 months for repeat depo shot OR transition to a longer-term contraception at this time  The website [email or web address] has some information on different drugs during lactation  You can always call the clinic and ask questions about specific medications    Medroxyprogesterone injection [Contraceptive]  Brand Names: Depo-Provera, Depo-subQ Provera 104  What is this medicine?  MEDROXYPROGESTERONE (me DROX ee proe HENRIQUE te patricia) contraceptive injections prevent pregnancy. They provide effective birth control for 3 months. Depo-subQ Provera 104 is also used for treating pain related to endometriosis.  How should I use this medicine?  Depo-Provera Contraceptive injection is given into a muscle. Depo-subQ Provera 104 injection is given under the skin. These injections are given by a health care professional. You must not be pregnant before getting an injection. The injection is usually given during the first 5 days after the start of a menstrual period or 6 weeks after delivery of a baby.  Talk to your pediatrician regarding the use of this medicine in children. Special care may be needed. These injections have been used in female children who have started having menstrual periods.  What side effects may I notice from receiving this medicine?  Side effects that you should report to your doctor or health care professional as soon as possible:    allergic reactions like skin rash, itching or hives, swelling  of the face, lips, or tongue    breast tenderness or discharge    breathing problems    changes in vision    depression    feeling faint or lightheaded, falls    fever    pain in the abdomen, chest, groin, or leg    problems with balance, talking, walking    unusually weak or tired    yellowing of the eyes or skin  Side effects that usually do not require medical attention (report to your doctor or health care professional if they continue or are bothersome):    acne    fluid retention and swelling    headache    irregular periods, spotting, or absent periods    temporary pain, itching, or skin reaction at site where injected    weight gain  What may interact with this medicine?  Do not take this medicine with any of the following medications:    bosentan  This medicine may also interact with the following medications:    aminoglutethimide    antibiotics or medicines for infections, especially rifampin, rifabutin, rifapentine, and griseofulvin    aprepitant    barbiturate medicines such as phenobarbital or primidone    bexarotene    carbamazepine    medicines for seizures like ethotoin, felbamate, oxcarbazepine, phenytoin, topiramate    modafinil    Letty's wort  What if I miss a dose?  Try not to miss a dose. You must get an injection once every 3 months to maintain birth control. If you cannot keep an appointment, call and reschedule it. If you wait longer than 13 weeks between Depo-Provera contraceptive injections or longer than 14 weeks between Depo-subQ Provera 104 injections, you could get pregnant. Use another method for birth control if you miss your appointment. You may also need a pregnancy test before receiving another injection.  Where should I keep my medicine?  This does not apply. The injection will be given to you by a health care professional.  What should I tell my health care provider before I take this medicine?  They need to know if you have any of these conditions:    frequently drink  alcohol    asthma    blood vessel disease or a history of a blood clot in the lungs or legs    bone disease such as osteoporosis    breast cancer    diabetes    eating disorder (anorexia nervosa or bulimia)    high blood pressure    HIV infection or AIDS    kidney disease    liver disease    mental depression    migraine    seizures (convulsions)    stroke    tobacco smoker    vaginal bleeding    an unusual or allergic reaction to medroxyprogesterone, other hormones, medicines, foods, dyes, or preservatives    pregnant or trying to get pregnant    breast-feeding  What should I watch for while using this medicine?  This drug does not protect you against HIV infection (AIDS) or other sexually transmitted diseases.  Use of this product may cause you to lose calcium from your bones. Loss of calcium may cause weak bones (osteoporosis). Only use this product for more than 2 years if other forms of birth control are not right for you. The longer you use this product for birth control the more likely you will be at risk for weak bones. Ask your health care professional how you can keep strong bones.  You may have a change in bleeding pattern or irregular periods. Many females stop having periods while taking this drug.  If you have received your injections on time, your chance of being pregnant is very low. If you think you may be pregnant, see your health care professional as soon as possible.  Tell your health care professional if you want to get pregnant within the next year. The effect of this medicine may last a long time after you get your last injection.  NOTE:This sheet is a summary. It may not cover all possible information. If you have questions about this medicine, talk to your doctor, pharmacist, or health care provider. Copyright  2017 [employer]                Follow-ups after your visit        Who to contact     Please call your clinic at [phone] to:    Ask questions about your health    Make or cancel  "appointments    Discuss your medicines    Learn about your test results    Speak to your doctor   If you have compliments or concerns about an experience at your clinic, or if you wish to file a complaint, please contact [facility] Patient Relations at [phone] or email us at [email or web address]         Additional Information About Your Visit        [employer] Information     [employer] gives you secure access to your electronic health record. If you see a primary care provider, you can also send messages to your care team and make appointments. If you have questions, please call your primary care clinic.  If you do not have a primary care provider, please call [phone] and they will assist you.      [employer] is an electronic gateway that provides easy, online access to your medical records. With [employer], you can request a clinic appointment, read your test results, renew a prescription or communicate with your care team.     To access your existing account, please contact your [facility] or call [phone] for assistance.        Care EveryWhere ID     This is your Care EveryWhere ID. This could be used by other organizations to access your [place] medical records  [id number]        Your Vitals Were     Pulse Temperature Height Pulse Oximetry Breastfeeding? BMI (Body Mass Index)    65 98  F (36.7  C) (Oral) 5' 0.51\" (153.7 cm) 98% Yes 28.13 kg/m2       Blood Pressure from Last 3 Encounters:   10/26/17 136/85   08/29/17 114/72   08/11/17 108/60    Weight from Last 3 Encounters:   10/26/17 146 lb 8 oz (66.5 kg)   08/29/17 160 lb 6.4 oz (72.8 kg)   08/11/17 154 lb 12.8 oz (70.2 kg)              We Performed the Following     ADMIN VACCINE, INITIAL     FLU VAC QUADRIVLENT SPLIT VIRUS IM 0.5ml dosage     INJECTION INTRAMUSCULAR OR SUB-Q     medroxyPROGESTERone (DEPO-PROVERA) injection 150 mg (Charge)          Today's Medication Changes        "   These changes are accurate as of: 10/26/17  4:48 PM.  If you have any questions, ask your nurse or doctor.               Start taking these medicines.        Dose/Directions    medroxyPROGESTERone 150 MG/ML injection   Commonly known as:  DEPO-PROVERA   Used for:  Encounter for initial prescription of injectable contraceptive   Started by:  Nelia Ward MD        Dose:  150 mg   Inject 1 mL (150 mg) into the muscle every 3 months   Quantity:  0.9 mL   Refills:  0            Where to get your medicines      These medications were sent to [facility] - [place], [place] - [address], [place] [address]     Phone:  [phone]     prenatal multivitamin plus iron 27-0.8 MG Tabs per tablet         Some of these will need a paper prescription and others can be bought over the counter.  Ask your nurse if you have questions.     You don't need a prescription for these medications     medroxyPROGESTERone 150 MG/ML injection                Primary Care Provider Office Phone # Fax #    Nelia Ward [phone]       [facility]  [address] [place] [address]        Equal Access to Services     JOSE PEREIRA AH: Hadii chhaya lama hadasho Soanup, waaxda luqadaha, qaybta kaalmada aderadhayaradha, stuart smalls. So [facility] [phone].    ATENCIÓN: Si habla español, tiene a darling disposición servicios gratuitos de asistencia lingüística. [facility] [phone].    We comply with applicable federal civil rights laws and [place] laws. We do not discriminate on the basis of race, color, national origin, age, disability, sex, sexual orientation, or gender identity.            Thank you!     Thank you for choosing [facility]  for your care. Our goal is always to provide you with excellent care. Hearing back from our patients is one way we can continue to improve our services. Please take a few minutes to complete the written survey that you  may receive in the mail after your visit with us. Thank you!             Your Updated Medication List - Protect others around you: Learn how to safely use, store and throw away your medicines at www.disposemymeds.org.          This list is accurate as of: 10/26/17  4:48 PM.  Always use your most recent med list.                   Brand Name Dispense Instructions for use Diagnosis    medroxyPROGESTERone 150 MG/ML injection    DEPO-PROVERA    0.9 mL    Inject 1 mL (150 mg) into the muscle every 3 months    Encounter for initial prescription of injectable contraceptive       order for DME     1 pump    Double electric breast pump    Supervision of high-risk pregnancy of elderly multigravida       prenatal multivitamin plus iron 27-0.8 MG Tabs per tablet     100 tablet    Take 1 tablet by mouth daily

## 2017-10-26 NOTE — PROGRESS NOTES
Preceptor attestation:  Patient seen and discussed with the resident. Assessment and plan reviewed with resident and agreed upon.  Supervising physician: Ramo Birch  [facility]

## 2018-01-19 ENCOUNTER — ALLIED HEALTH/NURSE VISIT (OUTPATIENT)
Dept: FAMILY MEDICINE | Facility: CLINIC | Age: 43
End: 2018-01-19
Payer: COMMERCIAL

## 2018-01-19 VITALS
BODY MASS INDEX: 28.69 KG/M2 | DIASTOLIC BLOOD PRESSURE: 86 MMHG | OXYGEN SATURATION: 98 % | WEIGHT: 149.4 LBS | SYSTOLIC BLOOD PRESSURE: 127 MMHG

## 2018-01-19 NOTE — MR AVS SNAPSHOT
After Visit Summary   1/19/2018    Stephanie Garcia    MRN: [id number]           Patient Information     Date Of Birth          1975        Visit Information        Provider Department      1/19/2018 10:30 AM NurseNabil [facility]        Today's Diagnoses     Contraception    -  1       Follow-ups after your visit        Who to contact     Please call your clinic at [phone] to:    Ask questions about your health    Make or cancel appointments    Discuss your medicines    Learn about your test results    Speak to your doctor   If you have compliments or concerns about an experience at your clinic, or if you wish to file a complaint, please contact [facility] Patient Relations at [phone] or email us at [email or web address]         Additional Information About Your Visit        MyChart Information     [employer] gives you secure access to your electronic health record. If you see a primary care provider, you can also send messages to your care team and make appointments. If you have questions, please call your primary care clinic.  If you do not have a primary care provider, please call [phone] and they will assist you.      [employer] is an electronic gateway that provides easy, online access to your medical records. With [employer], you can request a clinic appointment, read your test results, renew a prescription or communicate with your care team.     To access your existing account, please contact your [facility] or call [phone] for assistance.        Care EveryWhere ID     This is your Care EveryWhere ID. This could be used by other organizations to access your [place] medical records  [id number]        Your Vitals Were     Pulse Oximetry BMI (Body Mass Index)                98% 28.69 kg/m2           Blood Pressure from Last 3 Encounters:   01/19/18 127/86   10/26/17 136/85   08/29/17 114/72    Weight  from Last 3 Encounters:   01/19/18 149 lb 6.4 oz (67.8 kg)   10/26/17 146 lb 8 oz (66.5 kg)   08/29/17 160 lb 6.4 oz (72.8 kg)              We Performed the Following     INJECTION INTRAMUSCULAR OR SUB-Q     medroxyPROGESTERone (DEPO-PROVERA) injection 150 mg (Charge)        Primary Care Provider Office Phone # Fax #    Nelia Ward [phone]       [facility]  [address] [place] [address]        Equal Access to Services     [facility] : Hadii chhaya lama hadasho Soanup, waaxda luqadaha, qaybta kaalmada onur, stuart kunz . So [facility] [phone].    ATENCIÓN: Si habla español, tiene a darling disposición servicios gratuitos de asistencia lingüística. Llame al [phone].    We comply with applicable federal civil rights laws and [place] laws. We do not discriminate on the basis of race, color, national origin, age, disability, sex, sexual orientation, or gender identity.            Thank you!     Thank you for choosing [facility]  for your care. Our goal is always to provide you with excellent care. Hearing back from our patients is one way we can continue to improve our services. Please take a few minutes to complete the written survey that you may receive in the mail after your visit with us. Thank you!             Your Updated Medication List - Protect others around you: Learn how to safely use, store and throw away your medicines at www.disposemymeds.org.          This list is accurate as of: 1/19/18 10:58 AM.  Always use your most recent med list.                   Brand Name Dispense Instructions for use Diagnosis    medroxyPROGESTERone 150 MG/ML injection    DEPO-PROVERA    0.9 mL    Inject 1 mL (150 mg) into the muscle every 3 months    Encounter for initial prescription of injectable contraceptive       order for DME     1 pump    Double electric breast pump    Supervision of high-risk pregnancy of elderly multigravida       prenatal  multivitamin plus iron 27-0.8 MG Tabs per tablet     100 tablet    Take 1 tablet by mouth daily

## 2018-01-19 NOTE — NURSING NOTE
I administered the following to Stephanie Garcia.    MEDICATION: Depo Provera 150mg  ROUTE: IM  SITE: RUQ - Abd.  DOSE: 150 mg per mL  LOT #: [id number]  :  [employer]   EXPIRATION DATE:  01/2022  NDC#: [address]-3     Was entire vial of medication used? Yes    Name of provider who requested the injection: Dr. Ward  Name of provider on site (faculty or community preceptor) at the time of performing the injection: Dr. Megha Meraz MA    Patient was on time and reminder card given to patient to return between 04/06/2018 - 05/04/2018

## 2018-01-23 RX ORDER — PRENATAL VIT/IRON FUM/FOLIC AC 27MG-0.8MG
1 TABLET ORAL DAILY
Qty: 100 TABLET | Refills: 3 | Status: CANCELLED | OUTPATIENT
Start: 2018-01-23

## 2018-01-31 PROBLEM — Z30.013 ENCOUNTER FOR INITIAL PRESCRIPTION OF INJECTABLE CONTRACEPTIVE: Status: ACTIVE | Noted: 2018-01-31

## 2018-05-04 ENCOUNTER — ALLIED HEALTH/NURSE VISIT (OUTPATIENT)
Dept: FAMILY MEDICINE | Facility: CLINIC | Age: 43
End: 2018-05-04
Payer: COMMERCIAL

## 2018-05-04 VITALS
DIASTOLIC BLOOD PRESSURE: 89 MMHG | WEIGHT: 152 LBS | SYSTOLIC BLOOD PRESSURE: 150 MMHG | TEMPERATURE: 98.6 F | BODY MASS INDEX: 29.19 KG/M2 | HEART RATE: 78 BPM

## 2018-05-04 DIAGNOSIS — Z30.013 ENCOUNTER FOR INITIAL PRESCRIPTION OF INJECTABLE CONTRACEPTIVE: Primary | ICD-10-CM

## 2018-05-04 NOTE — NURSING NOTE
The following medication was given:     MEDICATION: Medroxyprogesterone 150 mg  ROUTE: IM  SITE: LUQ - Gluteus  DOSE: 150 mg  LOT #: [id number]  :  AILYN Metzger  EXPIRATION DATE:  05/2019  NDC#: 9087-5816-36   Medication administered by: Sharee Whittaker CMA  Next depo injection is due between 7/20 to 8/17/2019  A Reminder card given to patient    Dr. Birch was available on site at the time of this service.     Patient's blood pressure is high today and was encourage her to schedule an appointment to see MD soon to talk about it. Patient agreed.

## 2018-05-04 NOTE — MR AVS SNAPSHOT
After Visit Summary   5/4/2018    Stephanie Garcia    MRN: [id number]           Patient Information     Date Of Birth          1975        Visit Information        Provider Department      5/4/2018 10:30 AM Nurse, Nabil carmelo [facility]        Today's Diagnoses     Encounter for initial prescription of injectable contraceptive    -  1       Follow-ups after your visit        Who to contact     Please call your clinic at [phone] to:    Ask questions about your health    Make or cancel appointments    Discuss your medicines    Learn about your test results    Speak to your doctor            Additional Information About Your Visit        MyChart Information     [employer] gives you secure access to your electronic health record. If you see a primary care provider, you can also send messages to your care team and make appointments. If you have questions, please call your primary care clinic.  If you do not have a primary care provider, please call [phone] and they will assist you.      [employer] is an electronic gateway that provides easy, online access to your medical records. With [employer], you can request a clinic appointment, read your test results, renew a prescription or communicate with your care team.     To access your existing account, please contact your [facility] or call [phone] for assistance.        Care EveryWhere ID     This is your Care EveryWhere ID. This could be used by other organizations to access your [place] medical records  [id number]        Your Vitals Were     Pulse Temperature BMI (Body Mass Index)             78 98.6  F (37  C) (Oral) 29.19 kg/m2          Blood Pressure from Last 3 Encounters:   05/04/18 150/89   01/19/18 127/86   10/26/17 136/85    Weight from Last 3 Encounters:   05/04/18 152 lb (68.9 kg)   01/19/18 149 lb 6.4 oz (67.8 kg)   10/26/17 146 lb 8 oz (66.5 kg)              We Performed the Following     INJECTION  INTRAMUSCULAR OR SUB-Q     medroxyPROGESTERone (DEPO-PROVERA) injection 150 mg (Charge)        Primary Care Provider Office Phone # Fax #    Nelia Ward [phone]       [facility]  [address] [place] [address]        Equal Access to Services     JOSE PEREIRA : Hadii aad ku hadasho Soomaali, waaxda luqadaha, qaybta kaalmada adeegyada, stuart mouranedyamilet smalls. So [facility] [phone].    ATENCIÓN: Si habla español, tiene a darling disposición servicios gratuitos de asistencia lingüística. [facility] [phone].    We comply with applicable federal civil rights laws and [place] laws. We do not discriminate on the basis of race, color, national origin, age, disability, sex, sexual orientation, or gender identity.            Thank you!     Thank you for choosing [facility]  for your care. Our goal is always to provide you with excellent care. Hearing back from our patients is one way we can continue to improve our services. Please take a few minutes to complete the written survey that you may receive in the mail after your visit with us. Thank you!             Your Updated Medication List - Protect others around you: Learn how to safely use, store and throw away your medicines at www.disposemymeds.org.          This list is accurate as of 5/4/18 11:22 AM.  Always use your most recent med list.                   Brand Name Dispense Instructions for use Diagnosis    medroxyPROGESTERone 150 MG/ML injection    DEPO-PROVERA    0.9 mL    Inject 1 mL (150 mg) into the muscle every 3 months    Encounter for initial prescription of injectable contraceptive       order for DME     1 pump    Double electric breast pump    Supervision of high-risk pregnancy of elderly multigravida       prenatal multivitamin plus iron 27-0.8 MG Tabs per tablet     100 tablet    Take 1 tablet by mouth daily

## 2018-08-03 ENCOUNTER — ALLIED HEALTH/NURSE VISIT (OUTPATIENT)
Dept: FAMILY MEDICINE | Facility: CLINIC | Age: 43
End: 2018-08-03
Payer: COMMERCIAL

## 2018-08-03 VITALS
DIASTOLIC BLOOD PRESSURE: 99 MMHG | OXYGEN SATURATION: 98 % | BODY MASS INDEX: 27.96 KG/M2 | TEMPERATURE: 98.1 F | RESPIRATION RATE: 16 BRPM | WEIGHT: 145.6 LBS | HEART RATE: 70 BPM | SYSTOLIC BLOOD PRESSURE: 139 MMHG

## 2018-08-03 NOTE — MR AVS SNAPSHOT
After Visit Summary   8/3/2018    Stephanie Garcia    MRN: [id number]           Patient Information     Date Of Birth          1975        Visit Information        Provider Department      8/3/2018 2:30 PM Nurse, [facility]        Today's Diagnoses     Contraception    -  1       Follow-ups after your visit        Who to contact     Please call your clinic at [phone] to:    Ask questions about your health    Make or cancel appointments    Discuss your medicines    Learn about your test results    Speak to your doctor            Additional Information About Your Visit        MyChart Information     IOCS gives you secure access to your electronic health record. If you see a primary care provider, you can also send messages to your care team and make appointments. If you have questions, please call your primary care clinic.  If you do not have a primary care provider, please call [phone] and they will assist you.      IOCS is an electronic gateway that provides easy, online access to your medical records. With IOCS, you can request a clinic appointment, read your test results, renew a prescription or communicate with your care team.     To access your existing account, please contact your [facility] or call [phone] for assistance.        Care EveryWhere ID     This is your Care EveryWhere ID. This could be used by other organizations to access your [place] medical records  [id number]        Your Vitals Were     Pulse Temperature Respirations Pulse Oximetry BMI (Body Mass Index)       70 98.1  F (36.7  C) (Oral) 16 98% 27.96 kg/m2        Blood Pressure from Last 3 Encounters:   08/03/18 (!) 139/99   05/04/18 150/89   01/19/18 127/86    Weight from Last 3 Encounters:   08/03/18 145 lb 9.6 oz (66 kg)   05/04/18 152 lb (68.9 kg)   01/19/18 149 lb 6.4 oz (67.8 kg)              We Performed the Following     INJECTION INTRAMUSCULAR OR  SUB-Q     medroxyPROGESTERone (DEPO-PROVERA) injection 150 mg (Charge)        Primary Care Provider Office Phone # Fax #    Nelia Ward [phone]       [facility]  [address] [place] [address]        Equal Access to Services     JOSE PEREIRA : Hadii aad ku hadasho Soomaali, waaxda luqadaha, qaybta kaalmada adeegyada, waxmaricruz mouranedyamilet smalls. So [facility] [phone].    ATENCIÓN: Si habla español, tiene a darling disposición servicios gratuitos de asistencia lingüística. Lillyame al [phone].    We comply with applicable federal civil rights laws and [place] laws. We do not discriminate on the basis of race, color, national origin, age, disability, sex, sexual orientation, or gender identity.            Thank you!     Thank you for choosing [facility]  for your care. Our goal is always to provide you with excellent care. Hearing back from our patients is one way we can continue to improve our services. Please take a few minutes to complete the written survey that you may receive in the mail after your visit with us. Thank you!             Your Updated Medication List - Protect others around you: Learn how to safely use, store and throw away your medicines at www.disposemymeds.org.          This list is accurate as of 8/3/18 11:59 PM.  Always use your most recent med list.                   Brand Name Dispense Instructions for use Diagnosis    medroxyPROGESTERone 150 MG/ML injection    DEPO-PROVERA    0.9 mL    Inject 1 mL (150 mg) into the muscle every 3 months    Encounter for initial prescription of injectable contraceptive       order for DME     1 pump    Double electric breast pump    Supervision of high-risk pregnancy of elderly multigravida       prenatal multivitamin plus iron 27-0.8 MG Tabs per tablet     100 tablet    Take 1 tablet by mouth daily

## 2018-08-03 NOTE — NURSING NOTE
The following medication was given:     MEDICATION: Medroxyprogesterone 150 mg  ROUTE: IM  SITE: RUQ - Gluteus  DOSE: 150 mg per mL  LOT #: [id number]  :  [place]cs   EXPIRATION DATE:  [id number]   NDC#: [address]-2   Medication administered by: Marbella Dhaliwal  Next depo injection is due between October 9, 2018 to November 16, 2018    Dr. Birch was available on site at the time of this service.

## 2018-10-12 ENCOUNTER — ALLIED HEALTH/NURSE VISIT (OUTPATIENT)
Dept: FAMILY MEDICINE | Facility: CLINIC | Age: 43
End: 2018-10-12
Payer: COMMERCIAL

## 2018-10-12 VITALS
DIASTOLIC BLOOD PRESSURE: 90 MMHG | WEIGHT: 142.8 LBS | TEMPERATURE: 97.7 F | BODY MASS INDEX: 27.42 KG/M2 | HEART RATE: 78 BPM | OXYGEN SATURATION: 98 % | SYSTOLIC BLOOD PRESSURE: 132 MMHG

## 2018-10-12 DIAGNOSIS — Z23 INFLUENZA VACCINE NEEDED: Primary | ICD-10-CM

## 2018-10-12 NOTE — NURSING NOTE
Injectable influenza vaccine documentation    1. Has the patient received the information for the influenza vaccine? YES    2. Does the patient have a severe allergy to eggs (Patients with a severe egg allergy should be assessed by a medical provider, RN, or clinical pharmacist. If they receive the influenza vaccine, please have them observed for 15 minutes.)? No    3. Has the patient had an allergic reaction to previous influenza vaccines? No    4. Has the patient had any severe allergic reactions to past influenza vaccines ? No       5. Does patient have a history of Guillain-[place] syndrome? No      Based on responses above, I administered the influenza vaccine.  Kae Phillips

## 2018-10-12 NOTE — MR AVS SNAPSHOT
After Visit Summary   10/12/2018    Stephanie Garcia    MRN: [id number]           Patient Information     Date Of Birth          1975        Visit Information        Provider Department      10/12/2018 9:45 AM Nurse, [facility]        Today's Diagnoses     Influenza vaccine needed    -  1       Follow-ups after your visit        Who to contact     Please call your clinic at [phone] to:    Ask questions about your health    Make or cancel appointments    Discuss your medicines    Learn about your test results    Speak to your doctor            Additional Information About Your Visit        MyChart Information     Amiato gives you secure access to your electronic health record. If you see a primary care provider, you can also send messages to your care team and make appointments. If you have questions, please call your primary care clinic.  If you do not have a primary care provider, please call [phone] and they will assist you.      Amiato is an electronic gateway that provides easy, online access to your medical records. With Amiato, you can request a clinic appointment, read your test results, renew a prescription or communicate with your care team.     To access your existing account, please contact your [facility] or call [phone] for assistance.        Care EveryWhere ID     This is your Care EveryWhere ID. This could be used by other organizations to access your [place] medical records  [id number]        Your Vitals Were     Pulse Temperature Pulse Oximetry BMI (Body Mass Index)          78 97.7  F (36.5  C) (Oral) 98% 27.42 kg/m2         Blood Pressure from Last 3 Encounters:   10/12/18 132/90   08/03/18 (!) 139/99   05/04/18 150/89    Weight from Last 3 Encounters:   10/12/18 142 lb 12.8 oz (64.8 kg)   08/03/18 145 lb 9.6 oz (66 kg)   05/04/18 152 lb (68.9 kg)              We Performed the Following     ADMIN VACCINE, INITIAL      FLU VAC QUADRIVLENT SPLIT VIRUS IM 0.5ml dosage        Primary Care Provider Office Phone # Fax #    Nelia Paige Ward [phone]       [facility]  [address] [place] [address]        Equal Access to Services     PASTORSARAI RANDALL : Hadii aad ku hadasho Soomaali, waaxda luqadaha, qaybta kaalmada adeegyada, waxay idiin haylenyn aderadha gore ze smalls. So [facility] [phone].    ATENCIÓN: Si habla español, tiene a darling disposición servicios gratuitos de asistencia lingüística. Llame al [phone].    We comply with applicable federal civil rights laws and [place] laws. We do not discriminate on the basis of race, color, national origin, age, disability, sex, sexual orientation, or gender identity.            Thank you!     Thank you for choosing [facility]  for your care. Our goal is always to provide you with excellent care. Hearing back from our patients is one way we can continue to improve our services. Please take a few minutes to complete the written survey that you may receive in the mail after your visit with us. Thank you!             Your Updated Medication List - Protect others around you: Learn how to safely use, store and throw away your medicines at www.disposemymeds.org.          This list is accurate as of 10/12/18  9:54 AM.  Always use your most recent med list.                   Brand Name Dispense Instructions for use Diagnosis    medroxyPROGESTERone 150 MG/ML injection    DEPO-PROVERA    0.9 mL    Inject 1 mL (150 mg) into the muscle every 3 months    Encounter for initial prescription of injectable contraceptive       order for DME     1 pump    Double electric breast pump    Supervision of high-risk pregnancy of elderly multigravida       prenatal multivitamin plus iron 27-0.8 MG Tabs per tablet     100 tablet    Take 1 tablet by mouth daily

## 2018-10-15 ENCOUNTER — TELEPHONE (OUTPATIENT)
Dept: FAMILY MEDICINE | Facility: CLINIC | Age: 43
End: 2018-10-15

## 2018-10-19 ENCOUNTER — OFFICE VISIT (OUTPATIENT)
Dept: FAMILY MEDICINE | Facility: CLINIC | Age: 43
End: 2018-10-19
Payer: COMMERCIAL

## 2018-10-19 VITALS
HEART RATE: 79 BPM | BODY MASS INDEX: 26.96 KG/M2 | DIASTOLIC BLOOD PRESSURE: 84 MMHG | RESPIRATION RATE: 20 BRPM | TEMPERATURE: 98.1 F | SYSTOLIC BLOOD PRESSURE: 135 MMHG | WEIGHT: 140.4 LBS | OXYGEN SATURATION: 99 %

## 2018-10-19 DIAGNOSIS — Z71.84 TRAVEL ADVICE ENCOUNTER: Primary | ICD-10-CM

## 2018-10-19 DIAGNOSIS — Z23 NEED FOR VACCINATION: ICD-10-CM

## 2018-10-19 RX ORDER — DIMENHYDRINATE 50 MG
50 TABLET ORAL EVERY 6 HOURS PRN
Qty: 30 TABLET | Refills: 1 | Status: SHIPPED | OUTPATIENT
Start: 2018-10-19 | End: 2019-02-06

## 2018-10-19 RX ORDER — LOPERAMIDE HYDROCHLORIDE 2 MG/1
TABLET ORAL
Qty: 30 TABLET | Refills: 0 | Status: SHIPPED | OUTPATIENT
Start: 2018-10-19 | End: 2018-10-19

## 2018-10-19 RX ORDER — AZITHROMYCIN 500 MG/1
TABLET, FILM COATED ORAL
Qty: 1 TABLET | Refills: 0 | Status: SHIPPED | OUTPATIENT
Start: 2018-10-19 | End: 2019-02-06

## 2018-10-19 RX ORDER — AZITHROMYCIN 500 MG/1
TABLET, FILM COATED ORAL
Qty: 1 TABLET | Refills: 0 | Status: SHIPPED | OUTPATIENT
Start: 2018-10-19 | End: 2018-10-19

## 2018-10-19 NOTE — MR AVS SNAPSHOT
After Visit Summary   10/19/2018    Stephanie Garcia    MRN: [id number]           Patient Information     Date Of Birth          1975        Visit Information        Provider Department      10/19/2018 10:20 AM Alfredo Ortega MD [facility]        Today's Diagnoses     Travel advice encounter    -  1    Need for vaccination           Follow-ups after your visit        Who to contact     Please call your clinic at [phone] to:    Ask questions about your health    Make or cancel appointments    Discuss your medicines    Learn about your test results    Speak to your doctor            Additional Information About Your Visit        MyChart Information     [employer] gives you secure access to your electronic health record. If you see a primary care provider, you can also send messages to your care team and make appointments. If you have questions, please call your primary care clinic.  If you do not have a primary care provider, please call [phone] and they will assist you.      [employer] is an electronic gateway that provides easy, online access to your medical records. With [employer], you can request a clinic appointment, read your test results, renew a prescription or communicate with your care team.     To access your existing account, please contact your [facility] or call [phone] for assistance.        Care EveryWhere ID     This is your Care EveryWhere ID. This could be used by other organizations to access your [place] medical records  [id number]        Your Vitals Were     Pulse Temperature Respirations Pulse Oximetry BMI (Body Mass Index)       79 98.1  F (36.7  C) (Oral) 20 99% 26.96 kg/m2        Blood Pressure from Last 3 Encounters:   10/19/18 135/84   10/12/18 132/90   08/03/18 (!) 139/99    Weight from Last 3 Encounters:   10/19/18 140 lb 6.4 oz (63.7 kg)   10/12/18 142 lb 12.8 oz (64.8 kg)   08/03/18 145 lb 9.6 oz (66 kg)              We  Performed the Following     ADMIN VACCINE, EACH ADDITIONAL     ADMIN VACCINE, INITIAL     HEPATITIS A VACCINE ADULT IM     TYPHOID VACCINE, IM          Today's Medication Changes          These changes are accurate as of 10/19/18 10:57 AM.  If you have any questions, ask your nurse or doctor.               Start taking these medicines.        Dose/Directions    azithromycin 500 MG tablet   Commonly known as:  ZITHROMAX   Used for:  Travel advice encounter   Started by:  Alfredo Ortega MD        Take 500mgs once in case of severe diarrhea   Quantity:  1 tablet   Refills:  0       dimenhyDRINATE 50 MG tablet   Commonly known as:  DRAMAMINE   Used for:  Travel advice encounter   Started by:  Alfredo Ortega MD        Dose:  50 mg   Take 1 tablet (50 mg) by mouth every 6 hours as needed (motion sickness)   Quantity:  30 tablet   Refills:  1            Where to get your medicines      These medications were sent to [facility] - [place], [place] - [address], [place] [address]     Phone:  [phone]     azithromycin 500 MG tablet    dimenhyDRINATE 50 MG tablet                Primary Care Provider Office Phone # Fax #    Nelia Paige Ward [phone]       [facility]  [address] [place] [address]        Equal Access to Services     SARAI PEREIRA AH: Hadii chhaya lama hadasho Somiraali, waaxda luqadaha, qaybta kaalmada adeegyada, stuart smalls. So [facility] [phone].    ATENCIÓN: Si habla español, tiene a darling disposición servicios gratuitos de asistencia lingüística. Monica al [phone].    We comply with applicable federal civil rights laws and [place] laws. We do not discriminate on the basis of race, color, national origin, age, disability, sex, sexual orientation, or gender identity.            Thank you!     Thank you for choosing [facility]  for your care. Our goal is always to provide you with excellent care. Hearing back  from our patients is one way we can continue to improve our services. Please take a few minutes to complete the written survey that you may receive in the mail after your visit with us. Thank you!             Your Updated Medication List - Protect others around you: Learn how to safely use, store and throw away your medicines at www.disposemymeds.org.          This list is accurate as of 10/19/18 10:57 AM.  Always use your most recent med list.                   Brand Name Dispense Instructions for use Diagnosis    azithromycin 500 MG tablet    ZITHROMAX    1 tablet    Take 500mgs once in case of severe diarrhea    Travel advice encounter       dimenhyDRINATE 50 MG tablet    DRAMAMINE    30 tablet    Take 1 tablet (50 mg) by mouth every 6 hours as needed (motion sickness)    Travel advice encounter       medroxyPROGESTERone 150 MG/ML injection    DEPO-PROVERA    0.9 mL    Inject 1 mL (150 mg) into the muscle every 3 months    Encounter for initial prescription of injectable contraceptive       order for DME     1 pump    Double electric breast pump    Supervision of high-risk pregnancy of elderly multigravida       prenatal multivitamin plus iron 27-0.8 MG Tabs per tablet     100 tablet    Take 1 tablet by mouth daily

## 2018-10-22 NOTE — PROGRESS NOTES
[facility] Travel Visit         Stephanie Garcia is a 43 year old female who presents for a pre-travel assessment visit.  She will be traveling to:    Destination(s):  Destination 1  [facility]  Date of arrival 11/3/18  Date of departure 12/1/18    Reason for travel: [employer]az[employer] festival and visit family    Stephanie Garcia has completed the travel questionnaire and it is reviewed: YES  Are there special circumstances which place this traveler at higher risk (List if 'yes')?: YES - currently breastfeeding    (For women only)  Is patient currently pregnant or might become pregnant within three months of this trip? NO - on DEPO contraception  Is she breastfeeding? YES    Past Medical History:   Diagnosis Date     NO ACTIVE PROBLEMS          Current Outpatient Prescriptions on File Prior to Visit:  medroxyPROGESTERone (DEPO-PROVERA) 150 MG/ML injection Inject 1 mL (150 mg) into the muscle every 3 months   Prenatal Vit-Fe Fumarate-FA (PRENATAL MULTIVITAMIN PLUS IRON) 27-0.8 MG TABS per tablet Take 1 tablet by mouth daily   order for DME Double electric breast pump     No current facility-administered medications on file prior to visit.         Allergies   Allergen Reactions     Nkda [No Known Drug Allergies]        Immunizations, travel specific:  -- Yellow fever: Not Required, Not Recommended  -- Hep A: Immunity status unknown  -- Hep B: Immunity status unknown  -- Typhoid (IM: booster every 2 years; PO: booster every 5 years): Immunity status unknown  -- Rabies  (Data on the need for and timing of additional booster doses are not available): Immunity status unknown  -- Meningococcal meningitis Immunity status unknown   -- German encephalitis (Data on the need for and timing of additional booster doses are not available):Immunity status unknown    Immunizations, routine adult:  -- Influenza Known to be not immune, not immunized  -- Polio: UTD with immunization   -- Diphtheria, Tetanus & Pertussis (DTaP):  UTD with immunization   -- Measles/ Mumps/ Rubella: presumed UTD with immunization   -- Varicella: presumed UTD with immunization   -- Pneumococcal (PPSV23 (Pneumovax)): Immunity status unknown  -- Pneumococcal (PCV13 (Prevnar)): Immunity status unknown    Malaria prophylaxis:  Recommended (refer to Ryan for destination-specific recommendation) NO        Review of Systems:     CONSTITUTIONAL: NEGATIVE for fever, chills, change in weight  ENT/MOUTH: NEGATIVE for ear, mouth and throat problems  RESP: NEGATIVE for significant cough or SOB  CV: NEGATIVE for chest pain, palpitations or peripheral edema          Objective:     /84 (BP Location: Left arm, Patient Position: Sitting, Cuff Size: Adult Regular)  Pulse 79  Temp 98.1  F (36.7  C) (Oral)  Resp 20  Wt 140 lb 6.4 oz (63.7 kg)  SpO2 99%  BMI 26.96 kg/m2  Body mass index is 26.96 kg/(m^2).    GENERAL: healthy, alert, well nourished, well hydrated, no distress  HENT: ear canals- normal; TMs- normal; Nose- normal; Mouth- no ulcers, no lesions  NECK: no tenderness, no adenopathy, no asymmetry, no masses, no stiffness; thyroid- normal to palpation  RESP: lungs clear to auscultation - no rales, no rhonchi, no wheezes  CV: regular rates and rhythm, normal S1 S2, no S3 or S4 and no murmur, no click or rub -  ABDOMEN: soft, no tenderness, no  hepatosplenomegaly, no masses, normal bowel sounds         Assessment and Plan   Stephanie was seen today for travel clinic.    Diagnoses and all orders for this visit:    Travel advice encounter  -     Discontinue: loperamide (IMODIUM A-D) 2 MG tablet; Take 2 tabs (4 mg) after first loose stool, and then take one tab (2 mg) after each diarrheal stool.  Max of 8 tabs (16 mg) per day.  -     Discontinue: azithromycin (ZITHROMAX) 500 MG tablet; Two tablets first day, then one tablet daily for four days.  -     azithromycin (ZITHROMAX) 500 MG tablet; Take 500mgs once in case of severe diarrhea  -     TYPHOID VACCINE, IM  -      HEPATITIS A VACCINE ADULT IM  -     ADMIN VACCINE, INITIAL  -     ADMIN VACCINE, EACH ADDITIONAL  -     dimenhyDRINATE (DRAMAMINE) 50 MG tablet; Take 1 tablet (50 mg) by mouth every 6 hours as needed (motion sickness)    Need for vaccination  -     TYPHOID VACCINE, IM  -     HEPATITIS A VACCINE ADULT IM  -     ADMIN VACCINE, INITIAL  -     ADMIN VACCINE, EACH ADDITIONAL        Based on patient's past history, review of immunization and immunity status, planned itinerary and current recommendations, the following are recommended:    Hep A vaccine   Typhoid vaccine   Traveler's diarrhea treatment prescribed.  However given that she is nursing, imodium relatively CI - discussed  I spent 5 min in counselling and coordination of care on food and water precautions  Requests motion sickness medication    Patient is given a copy of the [employer] traveller report as well as destination-specific recommendations on sun protection, avoiding insect bites and travel safety.      Total of 25 minutes was spent in face to face contact with patient with > 50% in counseling and coordination of care.  Options for treatment and/or follow-up care were reviewed with the patient. Stephanie Garcia was engaged and actively involved in the decision making process. She verbalized understanding of the options discussed and was satisfied with the final plan.      Alfredo Ortega MD

## 2019-02-06 ENCOUNTER — OFFICE VISIT (OUTPATIENT)
Dept: FAMILY MEDICINE | Facility: CLINIC | Age: 44
End: 2019-02-06
Payer: COMMERCIAL

## 2019-02-06 VITALS
DIASTOLIC BLOOD PRESSURE: 89 MMHG | TEMPERATURE: 97.6 F | WEIGHT: 142.6 LBS | BODY MASS INDEX: 28 KG/M2 | RESPIRATION RATE: 20 BRPM | SYSTOLIC BLOOD PRESSURE: 137 MMHG | OXYGEN SATURATION: 98 % | HEART RATE: 76 BPM | HEIGHT: 60 IN
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
## 2019-02-06 DIAGNOSIS — M25.521 RIGHT ELBOW PAIN: ICD-10-CM

## 2019-02-06 RX ORDER — IBUPROFEN 400 MG/1
400 TABLET, FILM COATED ORAL EVERY 6 HOURS PRN
Qty: 90 TABLET | Refills: 0 | Status: SHIPPED | OUTPATIENT
Start: 2019-02-06 | End: 2019-12-18

## 2019-02-06 ASSESSMENT — PAIN SCALES - GENERAL: PAINLEVEL: MODERATE PAIN (5)

## 2019-02-06 ASSESSMENT — MIFFLIN-ST. JEOR: SCORE: 1233.33

## 2019-02-06 NOTE — PROGRESS NOTES
Preceptor Attestation:   Patient seen, evaluated and discussed with the resident. I have verified the content of the note, which accurately reflects my assessment of the patient and the plan of care.   Supervising Physician:  Edenilson Blunt MD

## 2019-02-06 NOTE — LETTER
[facility]  [address] [address]  Phone: [phone]  Fax: [phone]    02/06/19    Stephanie Garcia  [address] [place] [address]      To whom it may concern:     Due to injury, Ms Garcia should refrain from lifting anything greater than 5 lbs for 2 weeks or until pain resolves. Please feel free to call the clinic with any questions or concerns.     Sincerely,      Nelia Ward MD

## 2019-02-06 NOTE — PROGRESS NOTES
SUBJECTIVE       Stephanie Garcia is a 43 year old  female with a PMH significant for:     Patient Active Problem List   Diagnosis     Encounter for initial prescription of injectable contraceptive     She presents after a fall on the ice. States that she feel with right arm outstretched. Elbow and hip hit first. There was immediate pain in the elbow. This AM, pain is worst in the elbow. It's hard to bend and straighten. There is some swelling in the R elbow, wrist and hand. Not taking anything for pain. Has been putting heat on the area at home.     PMH, Medications and Allergies were reviewed and updated as needed.        REVIEW OF SYSTEMS     See HPI        OBJECTIVE     Vitals:    02/06/19 1035   BP: 137/89   Pulse: 76   Resp: 20   Temp: 97.6  F (36.4  C)   TempSrc: Oral   SpO2: 98%   Weight: 64.7 kg (142 lb 9.6 oz)   Height: 1.524 m (5')     Body mass index is 27.85 kg/m .    Gen: Well-appearing adult female. Alert, oriented and appropriate. NAD  HEENT: MMM  CV: Appears well-perfused. Radial pulses 2+ bilaterally  Pulm: Breathing comfortably on room air  MSK: R elbow, forearm, hand with mild to moderate edema. No deformity or ecchymosis. There is mild tenderness to palpation over the radial head. No tenderness over the snuffbox. There is mild limitation to active and passive elbow extension. Full elbow flexion, wrist flexion and extension, finger ROM.   Neuro: R arm with 5/5 strength to elbow flexion and extension, wrist flexion and extension, finger grasp. Sensation is grossly intact over the R arm and hand.     No results found for this or any previous visit (from the past 24 hour(s)).    ASSESSMENT AND PLAN     1. Fall, initial encounter  2. Right elbow pain  XR shows edema but no fracture. Radiology overread is pending. Patient provided with ACE bandage and arm sling for comfort. Discussed that this should not be used at all times, but only e.g. At work when she is standing for long periods.  Recommend ICE and NSAIDs for swelling and pain. Note limiting lifting x 2 weeks provided for work. Discussed that if worsening or not improving within 1-2 weeks, she should RTC for recheck.  - XR ELBOW RT 2 VW  - ibuprofen (ADVIL/MOTRIN) 400 MG tablet; Take 1 tablet (400 mg) by mouth every 6 hours as needed for moderate pain  Dispense: 90 tablet; Refill: 0  - order for DME; Equipment being ordered: arm sling  Dispense: 1 Device; Refill: 0  - order for DME; Equipment being ordered: ACE wrap  Dispense: 1 Device; Refill: 0        RTC for routine care or sooner if develops new or worsening symptoms.    Nelia Ward I precepted today with Edenilson Blunt MD.

## 2019-02-06 NOTE — NURSING NOTE
Chief Complaint   Patient presents with     RECHECK     Patient fell on ice last night and now she has pain in left right forearm and right hand, also swelling in both.      Usama Up, CMA

## 2019-02-06 NOTE — PATIENT INSTRUCTIONS
To do list:  1) Ice 20 mins on and 20 mins off  2) Ibuprofen or tylenol every 6-8 hours  3) Elevate the elbow when resting  4) Use sling for comfort  5) Use ACE bandage for comfort    If not improving within 1-2 weeks, return to clinic for recheck

## 2019-11-06 ENCOUNTER — HEALTH MAINTENANCE LETTER (OUTPATIENT)
Age: 44
End: 2019-11-06

## 2019-12-18 ENCOUNTER — OFFICE VISIT (OUTPATIENT)
Dept: FAMILY MEDICINE | Facility: CLINIC | Age: 44
End: 2019-12-18
Payer: COMMERCIAL

## 2019-12-18 VITALS
SYSTOLIC BLOOD PRESSURE: 143 MMHG | DIASTOLIC BLOOD PRESSURE: 88 MMHG | OXYGEN SATURATION: 100 % | RESPIRATION RATE: 20 BRPM | TEMPERATURE: 98.2 F | WEIGHT: 137 LBS | BODY MASS INDEX: 26.76 KG/M2 | HEART RATE: 79 BPM

## 2019-12-18 DIAGNOSIS — I10 ESSENTIAL HYPERTENSION: Primary | ICD-10-CM

## 2019-12-18 LAB
BACTERIA: NORMAL
BILIRUBIN UR: NEGATIVE MG/DL
BLOOD UR: NEGATIVE MG/DL
BUN SERPL-MCNC: 14 MG/DL (ref 7–19)
CALCIUM SERPL-MCNC: 9.2 MG/DL (ref 8.5–10.1)
CASTS: NORMAL /LPF
CHLORIDE SERPLBLD-SCNC: 99.6 MMOL/L (ref 98–110)
CHOLEST SERPL-MCNC: 158.3 MG/DL (ref 0–200)
CHOLEST/HDLC SERPL: 3 {RATIO} (ref 0–5)
CO2 SERPL-SCNC: 27.7 MMOL/L (ref 20–32)
CREAT SERPL-MCNC: 0.7 MG/DL (ref 0.5–1)
CRYSTAL URINE: NORMAL /LPF
EPITHELIAL CELLS UR: NORMAL /LPF (ref 0–2)
GFR SERPL CREATININE-BSD FRML MDRD: >90 ML/MIN/1.7 M2
GLUCOSE SERPL-MCNC: 98.8 MG'DL (ref 70–99)
GLUCOSE URINE: NEGATIVE
HDLC SERPL-MCNC: 53.6 MG/DL
KETONES UR QL: NEGATIVE MG/DL
LDLC SERPL CALC-MCNC: 84 MG/DL (ref 0–129)
LEUKOCYTE ESTERASE UR: NEGATIVE
MUCOUS URINE: NORMAL LPF
NITRITE UR QL STRIP: NEGATIVE MG/DL
PH UR STRIP: 5.5 [PH] (ref 4.5–8)
POTASSIUM SERPL-SCNC: 3.9 MMOL/DL (ref 3.2–4.6)
PROTEIN UR: NEGATIVE MG/DL
RBC URINE: <2 /HPF
SODIUM SERPL-SCNC: 134 MMOL/L (ref 132–142)
SP GR UR STRIP: >=1.03 (ref 1–1.03)
TRIGL SERPL-MCNC: 103 MG/DL (ref 0–150)
UROBILINOGEN UR STRIP-ACNC: NORMAL E.U./DL
VLDL CHOLESTEROL: 20.6 MG/DL (ref 7–32)
WBC URINE: <2 /HPF

## 2019-12-18 NOTE — PROGRESS NOTES
Preceptor attestation:  Vital signs reviewed: BP (!) 143/88   Pulse 79   Temp 98.2  F (36.8  C) (Oral)   Resp 20   Wt 62.1 kg (137 lb)   LMP 11/28/2019 (Approximate)   SpO2 100%   Breastfeeding No   BMI 26.76 kg/m      Patient seen, evaluated, and discussed with the resident.  I have verified the content of the note, which accurately reflects my assessment of the patient and the plan of care.    Supervising physician: Katarina Anguiano MD  [facility]

## 2019-12-18 NOTE — RESULT ENCOUNTER NOTE
I left a voicemail for patient.  Please send patient a result letter as well.     Dear Ms. Garcia,     Good news! Your lab tests all returned completely normal. Your kidney function, electrolytes, cholesterol and urine studies were normal. Please continue working on a healthier diet and exercise as we discussed. I look forward to seeing you next month. Continue logging your blood pressure and bring that to your next appointment.     Please call or return with any questions or concerns,   Dennis Abreu MD

## 2019-12-18 NOTE — LETTER
December 19, 2019      Stephanie Radha  [address]  [place] [address]        Dear Stephanie,    Dear Ms. Garcia,     Good news! Your lab tests all returned completely normal. Your kidney function, electrolytes, cholesterol and urine studies were normal. Please continue working on a healthier diet and exercise as we discussed. I look forward to seeing you next month. Continue logging your blood pressure and bring that to your next appointment.     Please call or return with any questions or concerns,   Dennis Abreu MD     Please see below for your test results.    Resulted Orders   Basic Metabolic Panel ([facility])   Result Value Ref Range    Urea Nitrogen 14.0 7.0 - 19.0 mg/dL    Calcium 9.2 8.5 - 10.1 mg/dL    Chloride 99.6 98.0 - 110.0 mmol/L    Carbon Dioxide 27.7 20.0 - 32.0 mmol/L    Creatinine 0.7 0.5 - 1.0 mg/dL    Glucose 98.8 70.0 - 99.0 mg'dL    Potassium 3.9 3.2 - 4.6 mmol/dL    Sodium 134.0 132.0 - 142.0 mmol/L    GFR Estimate >90 >60.0 mL/min/1.7 m2    GFR Estimate If Black >90 >60.0 mL/min/1.7 m2   Urinalysis, Micro If ([facility])   Result Value Ref Range    Specific Gravity Urine >=1.030 1.005 - 1.030    pH Urine 5.5 4.5 - 8.0    Leukocyte Esterase UR Negative NEGATIVE    Nitrite Urine Negative NEGATIVE mg/dL    Protein UR Negative NEGATIVE mg/dL    Glucose Urine Negative NEGATIVE    Ketones Urine Negative NEGATIVE mg/dL    Urobilinogen mg/dL 0.2 E.U./dL 0.2 E.U./dL E.U./dL    Bilirubin UR Negative NEGATIVE mg/dL    Blood UR Negative NEGATIVE mg/dL   Lipid Panel ([facility])   Result Value Ref Range    Cholesterol 158.3 0.0 - 200.0 mg/dL    Cholesterol/HDL Ratio 3.0 0.0 - 5.0    HDL Cholesterol 53.6 >40.0 mg/dL    LDL Cholesterol Calculated 84 0 - 129 mg/dL    Triglycerides 103.0 0.0 - 150.0 mg/dL    VLDL Cholesterol 20.6 7.0 - 32.0 mg/dL   Urine Microscopic ([facility])   Result Value Ref Range    WBC Urine <2 <5 /hpf    RBC Urine <2 <5 /hpf    Epithelial Cells UR 5-10 0 - 2 /lpf    Mucous Urine  Few NONE lpf    Casts Urine None NONE /lpf    Crystal Urine calcium oxalate NONE /lpf    Bacteria Wet Prep Few None       If you have any questions, please call the clinic to make an appointment.    Sincerely,    Dennis Abreu MD

## 2019-12-18 NOTE — PROGRESS NOTES
[facility] Visit    Subjective:  Stephanie Garcia is a 44 year old female with a PMHx significant for   Patient Active Problem List   Diagnosis     Encounter for initial prescription of injectable contraceptive    who presents with concern about high blood pressure.     Patient went to a [facility] last month for a viral URI, and was told she was hypertensive to the 140s/100s.  Patient was very concerned about this and decided to make some lifestyle changes, including lower sodium diet and more exercise.  She reports losing about 4 lbs intentionally in the past month because of this.  She denies any tobacco, ETOH or illicit drug use.  She does take Aleve occasionally for headaches.  She has a FHx of HTN of her parents and older sister.  She otherwise denies any significant PMHx, but she has been borderline hypertensive at prior clinic visits multiple times in the past few years.  She denies any acute symptoms/complaints, but occasionally gets mild headaches and blurry vision.  No lightheadedness, dizziness, chest/neck pain, abd pain, nausea, vomiting, leg swelling.      Objective:  Vitals:    12/18/19 1125 12/18/19 1129   BP: (!) 151/94 (!) 143/88   Pulse: 79    Resp: 20    Temp: 98.2  F (36.8  C)    TempSrc: Oral    SpO2: 100%    Weight: 62.1 kg (137 lb)      Body mass index is 26.76 kg/m .    GEN: NAD, healthy, alert  NECK: no LAD, masses  RESP: CTAB, no w/r/r  CV: RRR, nl S1/S2, no m/r/g, no peripheral edema  MSK: no MSK defects noted, gait is age appropriate w/o ataxia  SKIN: no suspicious lesions or rashes  NEURO: no obvious focal deficits, mentation intact, speech normal  PSYCH: mentation appears normal, affect normal/bright    Assessment/Plan:  Stephanie was seen today for hypertension.    Diagnoses and all orders for this visit:    Essential hypertension  Patient appears to have eHTN; no other complicating factors that would contribute except maybe some stress.  Has a strong FHx of eHTN.   No tobacco or ETOH use.  Asymptomatic today but perhaps gets some headaches that could be related to elevated BP sometimes.  She is very motivated to lose weight, eat healthier and monitor her BP at home regularly - handouts provided for all of these.  Will check labs and follow up in 1 month.  Consider starting an anti-HTN if persistently elevated BP at home/clinic and her lab results.    -     Basic Metabolic Panel ([facility])  -     Urinalysis, Micro If ([facility])  -     Lipid Panel ([facility])    Follow up in 1 month for BP recheck.     Options for treatment and follow-up care were reviewed with the patient who was engaged and actively involved in the decision making process, verbalized understanding of the options discussed, and satisfied with the final plan.    Patient was staffed with supervising physician, Dr. Anguiano.     Dennis Abreu MD, PGY3  [facility]

## 2019-12-18 NOTE — PATIENT INSTRUCTIONS
Lifestyle Modifications to Manage Hypertension    Weight reduction to a body mass index of 18.5 to 24.9 will give rise to a systolic blood pressure reduction of  5 to 20 mm Hg.  Your current BMI is Body mass index is 26.76 kg/m .    Incorporating the DASH Diet (a diet rich in fruits, vegetables, and low-fat dairy products with a reduced content of saturated and total fat) is as effective as a single drug agent.  This will provide a reduction of  8 to 14 mm Hg    Dietary sodium restriction to no more than 100 mmol per day (2.4 g sodium or 6 g sodium chloride) will provide a reduction of  2 to 8 mm Hg.    Engage in regular aerobic activity such as brisk walking (at least 30 minutes per day, most days of the week). This will provide a reduction of  4 to 9 mm Hg.    Limit alcohol consumption to no more than two drinks (1 oz or 30 mL of alcohol; e.g., 24-oz beer, 10 oz of wine, or 3 oz of 80-proof whiskey) per day in most men and to no more than one drink per day in women and lighter-weight persons. This will provide a reduction of  2 to 4 mm Hg.    If continues to be elevated, we should consider using a low dose of a medication to improve this and lower your risk for heart attack and stroke.      Patient Education     Step-by-Step  Checking Your Blood Pressure    Date Last Reviewed: 4/27/2016 2000-2018 [employer]. [address] [place] [address]. All rights reserved. This information is not intended as a substitute for professional medical care. Always follow your healthcare professional's instructions.           Patient Education     Low-Salt Choices  Eating salt (sodium) can make your body retain too much water. Excess water makes your heart work harder. Canned, packaged, and frozen foods are easy to prepare. But they are often high in sodium. Here are some ideas for low-salt foods you can easily make yourself.    For breakfast    Fruit or 100% fruit juice    Whole-wheat bread or an English  muffin. Look for sodium content on Nutrition Facts labels.    Low-fat milk or yogurt    Unsalted eggs    Shredded wheat    Corn tortillas    Unsalted steamed rice    Regular (not instant) hot cereal, made without salt  Stay away from:    Sausage, st, and ham    Flour tortillas    Packaged muffins, pancakes, and biscuits    Instant hot cereals    Cottage cheese  For lunch and dinner    Fresh fish, chicken, turkey, or meat--baked, broiled, or roasted without salt    Dry beans, cooked without salt    Tofu, stir-fried without salt    Unsalted fresh fruit and vegetables, or frozen or canned fruit and vegetables with no added salt  Stay away from:    Lunch or deli meat that is cured or smoked    Cheese    Tomato juice and ketchup    Canned vegetables, soups, and fish not labeled as no-salt-added or reduced sodium    Packaged gravies and sauces    Olives, pickles, and relish    Bottled salad dressings  For snacks and desserts    Yogurt    Unsalted, air-popped popcorn    Unsalted nuts or seeds  Stay away from:    Pies and cakes    Packaged dessert mixes    Pizza    Canned and packaged puddings    Pretzels, chips, crackers, and nuts--unless the label says unsalted  Date Last Reviewed: 6/1/2017 2000-2018 [employer]. [address] [place] [address]. All rights reserved. This information is not intended as a substitute for professional medical care. Always follow your healthcare professional's instructions.           Patient Education     Tips for Using Less Salt    Most people with heart problems need to eat less salt (sodium). Reducing the amount of salt you eat may help control your blood pressure. The higher your blood pressure, the greater your risk for heart disease, stroke, blindness, and kidney problems.  At the store    Make low-salt choices by reading labels carefully. Look for the total amount of sodium per serving.    Use more fresh food. Buy more fruits and vegetables. Select lean  meats, fish, and poultry.    Use fewer frozen, canned, and packaged foods which often contain a lot of sodium.    Use plain frozen vegetables without sauces or toppings. These products are often low-sodium or no-sodium.    Choose reduced-sodium or no-salt-added versions of canned vegetables and soups.  In the kitchen    Don't add salt to food when you're cooking. Season with flavorings such as onion, garlic, pepper, salt-free herbal blends, and lemon or lime juice.    Use a cookbook containing low-salt recipes. It can give you ideas for tasty meals that are healthy for your heart.    Sprinkle salt-free herbal blends on vegetables and meat.    Drain and rinse canned foods, such as canned beans and vegetables, before cooking or eating.  Eating out    Tell the  you're on a low-salt diet. Ask questions about the menu.    Order fish, chicken, and meat broiled, baked, poached, or grilled without salt, butter, or breading.    Use lemon, pepper, and salt-free herb mixes to add flavor.    Choose plain steamed rice, boiled noodles, and baked or boiled potatoes. Top potatoes with chives and a little sour cream.     Beware! Salt goes by many other names. Limit foods with these words listed as ingredients: salt, sodium, soy sauce, baking soda, baking powder, MSG, monosodium, Na (the chemical symbol for sodium). Some antacids are also high in salt.   Date Last Reviewed: 6/1/2017 2000-2018 [employer]. [address] [place] [address], [place] [address]. All rights reserved. This information is not intended as a substitute for professional medical care. Always follow your healthcare professional's instructions.

## 2020-04-16 ENCOUNTER — TELEPHONE (OUTPATIENT)
Dept: FAMILY MEDICINE | Facility: CLINIC | Age: 45
End: 2020-04-16

## 2020-04-16 NOTE — TELEPHONE ENCOUNTER
Reached out to patient during COVID19 Clinic outreach. Reassured patient that [facility] is still open and has started implementing phone and video appointments to help patient remain safe at home.     Patient reports the following concerns: None, family is doing well.      Offered MyChart. Already signed up      Bonita Hubbard

## 2020-05-18 ENCOUNTER — VIRTUAL VISIT (OUTPATIENT)
Dept: FAMILY MEDICINE | Facility: CLINIC | Age: 45
End: 2020-05-18
Payer: COMMERCIAL

## 2020-05-18 ENCOUNTER — TELEPHONE (OUTPATIENT)
Dept: FAMILY MEDICINE | Facility: CLINIC | Age: 45
End: 2020-05-18

## 2020-05-18 VITALS — BODY MASS INDEX: 25.97 KG/M2 | WEIGHT: 133 LBS

## 2020-05-18 DIAGNOSIS — R07.89 ATYPICAL CHEST PAIN: Primary | ICD-10-CM

## 2020-05-18 RX ORDER — FAMOTIDINE 20 MG/1
20 TABLET, FILM COATED ORAL 2 TIMES DAILY
Qty: 60 TABLET | Refills: 0 | Status: SHIPPED | OUTPATIENT
Start: 2020-05-18 | End: 2021-02-02

## 2020-05-18 NOTE — PROGRESS NOTES
Preceptor Attestation:    I talked to the patient on the phone and discussed the patient with the resident. I have verified the content of the note, which accurately reflects my assessment of the patient and the plan of care.   Supervising Physician:  John Isaacs MD.

## 2020-05-18 NOTE — TELEPHONE ENCOUNTER
"Patient reports she has been experiencing intermittent center/left sided chest pain since May 2nd. She describes it as being in the \"rib lining.\" It happens every other day or so and lasts for minutes. It goes away on its own. It is not brought on by activity or any other consistent action. This pain is non radiating, and not associating with any shortness of breath, n/v, or diaphoresis. She denies any recent injury or trauma. She is not having the pain at this time.    Given above information, patient OK to wait until 3:30 appointment today to discuss with provider. Patient instructed to immediately call back if the pain returns. Routed to Dr. Ritter. ./LR  "

## 2020-05-18 NOTE — PROGRESS NOTES
"Family Medicine Telephone Visit Note         Telephone Visit Consent   Patient was verbally read the following and verbal consent was obtained.    \"Telephone visits are billed at different rates depending on your insurance coverage. During this emergency period, for some insurers they may be billed the same as an in-person visit.  Please reach out to your insurance provider with any questions.  If during the course of the call the physician/provider feels a telephone visit is not appropriate, you will not be charged for this service.\"    Name person giving consent:  Patient   Date verbal consent given:  5/18/2020  Time verbal consent given:  3:38 PM     Chief Complaint   Patient presents with     Chest Pain     chest discomfort since 5/2/2020 for 2 weeks           HPI   Patients name: Stephanie  Appointment start time:  3:49 PM    Chest Pain     Onset: 2 weeks    Description:   Location:  left side  Character: pinch, sharp  Radiation: none  Duration: intermittent, minutes at a jaime    Intensity: 5/10    Progression of Symptoms:  same    Accompanying Signs & Symptoms:  Shortness of breath: No   Sweating: No   Nausea/vomiting: No   Lightheadedness: No   Palpitations: No   Fever/Chills: No   Cough: No   Heartburn: No     History:   Family history of heart disease No   Tobacco use: No     Precipitating factors:   Worse with exercise/exertion:No   Worse with deep breaths :  No   Related to food: No       Alleviating factors: none      Therapies Tried and outcome: none    No current outpatient medications on file.     Allergies   Allergen Reactions     Nkda [No Known Drug Allergies]           Review of Systems:   Constitutional, HEENT, cardiovascular, pulmonary, gi and gu systems are negative, except as otherwise noted.         Physical Exam:   There were no vitals taken for this visit.  Estimated body mass index is 26.76 kg/m  as calculated from the following:    Height as of 2/6/19: 1.524 m (5').    Weight as of 12/18/19: " 62.1 kg (137 lb).    Exam:  Constitutional: healthy, alert and no distress  Psychiatric: mentation appears normal, affect normal/bright and anxious    Results from last visit:  Office Visit on 12/18/2019   Component Date Value Ref Range Status     Urea Nitrogen 12/18/2019 14.0  7.0 - 19.0 mg/dL Final     Calcium 12/18/2019 9.2  8.5 - 10.1 mg/dL Final     Chloride 12/18/2019 99.6  98.0 - 110.0 mmol/L Final     Carbon Dioxide 12/18/2019 27.7  20.0 - 32.0 mmol/L Final     Creatinine 12/18/2019 0.7  0.5 - 1.0 mg/dL Final     Glucose 12/18/2019 98.8  70.0 - 99.0 mg'dL Final     Potassium 12/18/2019 3.9  3.2 - 4.6 mmol/dL Final     Sodium 12/18/2019 134.0  132.0 - 142.0 mmol/L Final     GFR Estimate 12/18/2019 >90  >60.0 mL/min/1.7 m2 Final     GFR Estimate If Black 12/18/2019 >90  >60.0 mL/min/1.7 m2 Final     Specific Gravity Urine 12/18/2019 >=1.030  1.005 - 1.030 Final     pH Urine 12/18/2019 5.5  4.5 - 8.0 Final     Leukocyte Esterase UR 12/18/2019 Negative  NEGATIVE Final     Nitrite Urine 12/18/2019 Negative  NEGATIVE mg/dL Final     Protein UR 12/18/2019 Negative  NEGATIVE mg/dL Final     Glucose Urine 12/18/2019 Negative  NEGATIVE Final     Ketones Urine 12/18/2019 Negative  NEGATIVE mg/dL Final     Urobilinogen mg/dL 12/18/2019 0.2 E.U./dL  0.2 E.U./dL E.U./dL Final     Bilirubin UR 12/18/2019 Negative  NEGATIVE mg/dL Final     Blood UR 12/18/2019 Negative  NEGATIVE mg/dL Final     Cholesterol 12/18/2019 158.3  0.0 - 200.0 mg/dL Final     Cholesterol/HDL Ratio 12/18/2019 3.0  0.0 - 5.0 Final     HDL Cholesterol 12/18/2019 53.6  >40.0 mg/dL Final     LDL Cholesterol Calculated 12/18/2019 84  0 - 129 mg/dL Final     Triglycerides 12/18/2019 103.0  0.0 - 150.0 mg/dL Final     VLDL Cholesterol 12/18/2019 20.6  7.0 - 32.0 mg/dL Final     WBC Urine 12/18/2019 <2  <5 /hpf Final     RBC Urine 12/18/2019 <2  <5 /hpf Final     Epithelial Cells UR 12/18/2019 5-10  0 - 2 /lpf Final     Mucous Urine 12/18/2019 Few  NONE lpf  Final     Casts Urine 12/18/2019 None  NONE /lpf Final     Crystal Urine 12/18/2019 calcium oxalate  NONE /lpf Final     Bacteria Wet Prep 12/18/2019 Few  None Final     The 10-year ASCVD risk score (Medimont NIKI Jr., et al., 2013) is: 0.5%    Values used to calculate the score:      Age: 44 years      Sex: Female      Is Non- : No      Diabetic: No      Tobacco smoker: No      Systolic Blood Pressure: 143 mmHg      Is BP treated: No      HDL Cholesterol: 53.6 mg/dL      Total Cholesterol: 158.3 mg/dL        Assessment and Plan   Stephanie was seen today for chest pain.    Diagnoses and all orders for this visit:    Atypical chest pain  -     famotidine (PEPCID) 20 MG tablet; Take 1 tablet (20 mg) by mouth 2 times daily    2-week history of intermittent nonexertional atypical chest pain.  History is not suggestive of cardiac etiology.  History is not typical for GERD but will trial and H2 blocker and monitor for symptomatic relief.  Instructed her to take famotidine twice daily for 1 to 2 weeks.  If her symptoms resolve, can continue famotidine as needed after the 1 to 2-week trial.  If her symptoms do not improve, recommended that she follow-up in 1 week.  May consider stress test at that time although that is limited by high rate of false positive.    Refilled medications that would be required in the next 3 months.     After Visit Information:  Patient chose to view AVS via [employer]    Return in about 1 week (around 5/25/2020), or if symptoms worsen or fail to improve.    Appointment end time: 4:10 PM  This is a telephone visit that took 21 minutes.    Clinician location:  [place]    Patricia Cam Ritter MD PGY2  I precepted today with Dr. Isaacs.

## 2020-05-18 NOTE — TELEPHONE ENCOUNTER
[facility] phone call message-patient reporting a symptom:     Symptom: Pt calling with symptoms of chest discomfort off and on for about 2 weeks,  no SOB, no arm pain    Same Day Visit Offered: Yes, accepted and schedule for 05/18/2020    Additional comments: .    OK to leave message on voice mail? Yes    Primary language: English      needed? No    Call taken on May 18, 2020 at 10:10 AM by Maisha Mccoy

## 2020-05-26 ENCOUNTER — VIRTUAL VISIT (OUTPATIENT)
Dept: FAMILY MEDICINE | Facility: CLINIC | Age: 45
End: 2020-05-26
Payer: COMMERCIAL

## 2020-05-26 VITALS — WEIGHT: 133 LBS | HEIGHT: 60 IN | BODY MASS INDEX: 26.11 KG/M2

## 2020-05-26 DIAGNOSIS — I10 ESSENTIAL HYPERTENSION: ICD-10-CM

## 2020-05-26 DIAGNOSIS — M94.0 COSTOCHONDRITIS: Primary | ICD-10-CM

## 2020-05-26 RX ORDER — OMEGA-3 FATTY ACIDS/FISH OIL 300-1000MG
200-400 CAPSULE ORAL EVERY 6 HOURS PRN
Qty: 90 CAPSULE | Refills: 0 | Status: SHIPPED | OUTPATIENT
Start: 2020-05-26 | End: 2021-07-27

## 2020-05-26 RX ORDER — HYDROCHLOROTHIAZIDE 12.5 MG/1
12.5 TABLET ORAL DAILY
Qty: 90 TABLET | Refills: 3 | Status: SHIPPED | OUTPATIENT
Start: 2020-05-26 | End: 2020-08-18

## 2020-05-26 RX ORDER — CHLORTHALIDONE 25 MG/1
25 TABLET ORAL DAILY
Qty: 90 TABLET | Refills: 3 | Status: CANCELLED | OUTPATIENT
Start: 2020-05-26

## 2020-05-26 ASSESSMENT — MIFFLIN-ST. JEOR: SCORE: 1184.78

## 2020-05-26 NOTE — NURSING NOTE
Patient stated that the clinic has to call her insurance before sending the medication to the pharmacy for the coverage.  [facility]: Phone: [phone]  Fax: [phone].

## 2020-05-26 NOTE — PROGRESS NOTES
Preceptor Attestation:    I talked to the patient on the phone and discussed the patient with the resident. I have verified the content of the note, which accurately reflects my assessment of the patient and the plan of care.   Supervising Physician:  John Casanova MD.

## 2020-05-26 NOTE — PROGRESS NOTES
"Family Medicine Telephone Visit Note         Telephone Visit Consent   Patient was verbally read the following and verbal consent was obtained.    \"Telephone visits are billed at different rates depending on your insurance coverage. During this emergency period, for some insurers they may be billed the same as an in-person visit.  Please reach out to your insurance provider with any questions.  If during the course of the call the physician/provider feels a telephone visit is not appropriate, you will not be charged for this service.\"    Name person giving consent:  Patient   Date verbal consent given:  5/26/2020  Time verbal consent given:  10:22 AM    Chief Complaint   Patient presents with     RECHECK     follow up from last visit on chest discomfort per patient.      Medication Reconciliation     reviewed.             HPI   Patients name: Stephanie  Appointment start time:  10:30 AM    Patient presents via telephone visit for follow-up chest pain.    Patient had a telephone visit on 5/18/2020 for chest discomfort.  Patient was having intermittent 5 out of 10 severity chest tightness that was described as \"sharp\"and \"like a pinch.\"  Chest discomfort was nonexertional in nature and was nonradiating.  Chest discomfort was not felt to be due to cardiac etiology, and patient was treated empirically with famotidine for possible gastroesophageal reflux.    Patient has been taking the famotidine for the past week.  She reports no significant improvement in her symptoms.  She continues to have 4-5 out of 10 severity left-sided chest discomfort.  Pain occurs intermittently.  Pain typically occurs during rest.  Pain is nonradiating and is perhaps worsened by the patient pressing on her chest.  Sometimes the pain feels superficial and other times deep.  She gets anxious when she has the chest discomfort as she worries about a heart attack.    The patient states that she is quite active.  She chases after her young child as well " as goes for long walks and jogs.  She does not have any chest discomfort during these exertional activities.  The patient has no shortness of breath, cough, or wheezes.  No heart palpitations, presyncope or syncope.  No fevers or chills.  No nausea, vomiting, diarrhea, or abdominal pain.  No skin rash involving the chest.  The patient reports no pain or swelling in her lower extremities.  There is no redness in her lower extremities as well.  She reports that she is no longer taking contraception    The patient does state that her father has been diagnosed with a blood clot and is on anticoagulation therapy.  The patient herself reports no prior history of blood clot.  She is without any recent prolonged immobilization, surgery, or trauma.  No known history of malignancy.  She reports an uncle who was diagnosed with coronary artery disease after experiencing chest pain.    Lastly, the patient reports that she has been diagnosed with high blood pressure.  She has never been started on antihypertensive treatment for this however.  Her most recent in office blood pressure reading was 143/88 mmHg.  She states that she checked her blood pressure this morning at home and it was 149/98 mmHg.  She reports diastolic blood pressure readings as high as 104 mmHg.    Current Outpatient Medications   Medication Sig Dispense Refill     famotidine (PEPCID) 20 MG tablet Take 1 tablet (20 mg) by mouth 2 times daily 60 tablet 0     hydrochlorothiazide (HYDRODIURIL) 12.5 MG tablet Take 1 tablet (12.5 mg) by mouth daily 90 tablet 3     ibuprofen (ADVIL/MOTRIN) 200 MG capsule Take 1-2 capsules (200-400 mg) by mouth every 6 hours as needed for fever 90 capsule 0     Allergies   Allergen Reactions     Nkda [No Known Drug Allergies]             Review of Systems:     CONSTITUTIONAL: See above.   SKIN: See above.   BREAST: No redness, masses, tenderness, or discharge.  RESPIRATORY: See above.   CARDIOVASCULAR: See above.   GASTROINTESTINAL:  See above.   MUSCULOSKELETAL: No muscle stiffness or pain. No joint pain or swelling.  HEMATOLOGIC: See above.          Physical Exam:     Ht 1.524 m (5')   Wt 60.3 kg (133 lb)   LMP 05/12/2020 (Exact Date)   BMI 25.97 kg/m    Estimated body mass index is 25.97 kg/m  as calculated from the following:    Height as of this encounter: 1.524 m (5').    Weight as of this encounter: 60.3 kg (133 lb).    Exam:  Constitutional: healthy, alert, no distress and cooperative  Psychiatric: mentation appears normal, affect normal/bright and judgment and insight intact        Assessment and Plan     1. Costochondritis  Patient presents with approximately 1 month history of intermittent left-sided chest discomfort.  Differential diagnoses include acute MI, PE, pericarditis, aortic dissection, GERD, pneumonia, pleuritis, pneumothorax, costochondritis, herpes zoster.  The nonexertional nature of the chest discomfort, as well as the fact that the patient is able to exercise and physically exert herself without provocation of chest discomfort makes acute MI seems very unlikely.  We will hold off on cardiac stress test for now.  The duration of her symptoms make life-threatening etiology such as aortic dissection seem unlikely as well.  Since the pain is intermittent, pericarditis and pleuritis seen less likely.  The patient is without shortness of breath and fever which makes pneumonia seem unlikely.  Could consider pneumothorax, although again, patient is without respiratory symptoms.  She is without a rash on the chest, and therefore, doubt herpes zoster.  The absence of improvement since starting famotidine makes GERD seem less likely as well.  Most strongly suspect musculoskeletal chest wall pain as the etiology of her current symptoms.  We will trial ibuprofen to see if this helps with her chest discomfort.  Patient was educated about the signs and symptoms of acute MI as well as other signs and symptoms that should prompt her  to seek immediate medical evaluation in the emergency department setting.  Patient was instructed to call the clinic to arrange follow-up visit in 1 week should her chest discomfort persist or fail to improve with ibuprofen.  - ibuprofen (ADVIL/MOTRIN) 200 MG capsule; Take 1-2 capsules (200-400 mg) by mouth every 6 hours as needed for fever  Dispense: 90 capsule; Refill: 0    2. Essential hypertension  Patient with prior diagnosis of hypertension although never started on antihypertensive treatment.  Has had both in office and at home blood pressure readings that have been elevated.  We will start antihypertensive therapy with hydrochlorothiazide today.  We will have patient return in approximately 1 month to follow-up blood pressure and check serum electrolytes and renal function.  Renal function and serum electrolytes were within normal limits in December 2019.  - hydrochlorothiazide (HYDRODIURIL) 12.5 MG tablet; Take 1 tablet (12.5 mg) by mouth daily  Dispense: 90 tablet; Refill: 3  - Basic Metabolic Panel ([facility]); Future    Refilled medications that would be required in the next 3 months.     After Visit Information:  Will print and mail AVS     Appointment end time: 10:50  This is a telephone visit that took 20 minutes.      Clinician location:  [facility]    Return to clinic in 4 weeks for follow-up of hypertension or sooner if develops new or worsening symptoms.    Patient was discussed with attending physician, Dr. John Casanova MD, who agrees with the assessment and plan.    Gus Meehan, PGY-2  [facility]  4/15/2020

## 2020-06-02 ENCOUNTER — TELEPHONE (OUTPATIENT)
Dept: FAMILY MEDICINE | Facility: CLINIC | Age: 45
End: 2020-06-02

## 2020-06-02 NOTE — TELEPHONE ENCOUNTER
I was present with the pharmacy student who participated in the service and in the documentation of this note. I have verified the history, personally performed the medical decision making, and have verified the content of the note, which accurately reflects my assessment of the patient and the plan of care.   Laurie Jim, [facility], PharmD

## 2020-06-02 NOTE — TELEPHONE ENCOUNTER
[facility] phone call message- medication clarification/question:    Full Medication Name:     hydrochlorothiazide (HYDRODIURIL) 12.5 MG tablet  Take 1 tablet (12.5 mg) by mouth dailyTake 1 tablet (12.5 mg) by mouth daily    Question:     Pt states she has questions about how to the medication.    Pharmacy confirmed as      [id number] - [place], [place] - [address]: Yes    OK to leave a message on voice mail? Yes    Primary language: English      needed? No    Call taken on June 2, 2020 at 8:56 AM by Valerie Pichardo CMA

## 2020-06-02 NOTE — TELEPHONE ENCOUNTER
Reached out to patient to discuss her question on hydrochlorothiazide. Patient reports before her appointment with Dr. Meehan on 5/26/20, her blood pressure was running above 140/90 mmHg. Dr. Meehan also documented an at home blood pressure of 149/98 mmHg. Her most recent clinic BP was 143/88 mmHg. Patient reports after her appointment on 5/26/20, her blood pressure has been:    5//91  5//91  5//88  5//88  6/1-126/87  6/2-114/89    Patient picked up her hydrochlorothiazide late and is wondering if she should still start hydrochlorothiazide with her blood pressure running a little lower? Reports she is no longer taking famotidine. Patient has not started on ibuprofen for her costochondritis in fear of increase blood pressure as a side effect. Patient is not taking any other prescription or over the counter medications. Discussed with patient she can hold off on starting hydrochlorothiazide sine her blood pressure has been consistently < 140/90 mmHg and is at low risk for cardiovascular disease. Discussed with patient to check her blood pressure twice daily for the next two weeks and to record the time, date and if she takes ibuprofen. PharmD will follow up in two weeks. Patient was agreeable to plan.     Josephine Toscano  Pharmacy Resident

## 2020-06-15 ENCOUNTER — ALLIED HEALTH/NURSE VISIT (OUTPATIENT)
Dept: PHARMACY | Facility: PHYSICIAN GROUP | Age: 45
End: 2020-06-15
Payer: COMMERCIAL

## 2020-06-15 DIAGNOSIS — I10 ESSENTIAL HYPERTENSION: Primary | ICD-10-CM

## 2020-06-15 PROCEDURE — 99207 ZZC NO CHARGE LOS: CPT | Performed by: PHARMACIST

## 2020-06-15 NOTE — PROGRESS NOTES
I have verified the content of the note, which accurately reflects my assessment of the patient and the plan of care.   Laurie Jim, [facility], PharmD

## 2020-06-15 NOTE — PROGRESS NOTES
Clinical Pharmacy Consult:                                                    Stephanie Garcia is a 44 year old female called for a clinical pharmacist consult.  She was referred to me from Dr. Meehan.     Reason for Consult: Patient is calling in blood pressure readings from telephone call on 6/2/20.     Discussion: Please see blood pressure table below. Patient was recommended to NOT initiate hydrochlorothiazide and to continue to record BP readings daily during telephone call on 6/2/20. Patient reports she has not taken any doses of ibuprofen during these past two weeks.Patient reports she sits and waits about 5 minutes before checking her blood pressure. She does drink 1-2 cups of tea daily in the morning and exercises around the house for 30 minutes in the morning however waits until her HR and blood pressure has calmed down before checking her blood pressure. Denies tobacco use.  Goal BP < 140/90 mmHg. Note there are a couple of readings where systolic is mildly >140 mmHg and diastolic is mildly >90 which I've bolded. However her average BP the past two weeks is 119/81mmHg. Discussed with patient 70% of her blood pressure readings are within goal, therefore she should NOT initiate the hydrochlorothiazide. Patient has a follow up appointment with Dr. Meehan on 6/29/20 for BP follow up. I've asked patient to bring in her blood pressure cuff so we can assess measuring technique, blood pressure cuff size and compare with our in-clinic blood pressure cuff. Patient was instructed to call the clinic of any BP >150/90. Patient agreed to plan.     Date Blood pressure AM (mmHg) Blood pressure PM (mmHg)   6/3/20 133/92 131/85   6/4/20 135/87 134/91   6/5/20 141/89 126/86   6/6/20 126/83 133/92   6/7/20 130/89 135/91   6/8/20 134/93 128/92   6/9/20 129/86 115/84   6/10/20 128/88 113/85   6/11/20 123/89 122/84   6/12/20 119/82 127/84   6/13/20 138/81 128/91   6/14/20 141/100 130/94       Plan:  1. Continue to hold  hydrochlorothiazide  2. Continue to check blood pressure daily and record any doses of ibuprofen  3. Bring in blood pressure cuff to next appointment on 6/29/20    Josephine Toscano  Pharmacy Resident

## 2020-06-29 ENCOUNTER — OFFICE VISIT (OUTPATIENT)
Dept: FAMILY MEDICINE | Facility: CLINIC | Age: 45
End: 2020-06-29
Payer: COMMERCIAL

## 2020-06-29 VITALS
HEART RATE: 88 BPM | WEIGHT: 131 LBS | HEIGHT: 61 IN | BODY MASS INDEX: 24.73 KG/M2 | DIASTOLIC BLOOD PRESSURE: 92 MMHG | OXYGEN SATURATION: 100 % | RESPIRATION RATE: 20 BRPM | TEMPERATURE: 98.9 F | SYSTOLIC BLOOD PRESSURE: 155 MMHG

## 2020-06-29 DIAGNOSIS — I10 ESSENTIAL HYPERTENSION: Primary | ICD-10-CM

## 2020-06-29 DIAGNOSIS — R07.89 ATYPICAL CHEST PAIN: ICD-10-CM

## 2020-06-29 ASSESSMENT — MIFFLIN-ST. JEOR: SCORE: 1183.65

## 2020-06-29 NOTE — PATIENT INSTRUCTIONS
Plan:  1. It was a pleasure seeing you in clinic today.  2. Start the hydrochlorothiazide 1 tablet daily. Check blood pressure daily. If blood pressure is less than 90/60 mmHg, contact the clinic and hold the hydrochlorothiazide. If you feel dizzy or lightheaded, check your blood pressure and contact the clinic if low.  3. Return to clinic in 2-4 weeks for blood pressure as well as lab work to check kidney function and electrolytes.      Please schedule a clinic appointment in 2-4 weeks for blood pressure and lab work or sooner as needed for any future concerns.     [facility]  Phone: [phone]  Address: [address] [place] [address]      Strategies to Lower Blood Pressure    Dietary Approaches to Stop Hypertension (DASH) diet is a healthy diet specifically designed and tested for lowering blood pressure.    DASH diet is high in dietary fiber, moderate in total fat and protein, and low in saturated fat, dietary cholesterol, and sodium.    DASH diet emphasizes increased fruits, vegetables, whole grains, low-fat dairy products, legumes, nuts, seeds, and lean meats.    Other strategies that effectively lower blood pressure include avoiding cigarette use, reducing alcohol consumption, and getting 30-60 minutes of moderate intensity exercise at least 3-5 days per week.    Medications known as antihypertensives can also be used in combination with the above strategies to lower blood pressure.      Dietary Changes  To help reduce blood pressure, it is recommended that individuals follow a low sodium (low salt) diet and low fat diet. Below are alternatives to high-sodium and high-fat foods.    Reducing Salt Content  Foods high in salt (sodium) Low-salt alternatives*     smoked, cured, salted, and canned meat, fish, poultry   unsalted fresh or frozen beef, lamb, pork, fish, poultry     regular hard and processed cheese    regular peanut butter   low-sodium cheese    low-sodium peanut butter      crackers with salted tops   unsalted crackers     regular canned and dehydrated soups    low-sodium soups, broths, bouillons     regular canned vegetables   fresh and frozen vegetables      salted snack foods   unsalted snack foods       Reducing Fat Content  Food category Foods high in fat Lower fat alternatives*   Dairy    whole milk    ice cream    cheese   skim, 1%, 2% milk    sorbet, sherbert, frozen yogurt    low- or reduced-fat cheese   Pasta   ramen noodles    pasta with cream sauce    granola   rice    pasta with tomato sauce    reduced fat granola   Meat, fish, poultry   ground beef    chicken or turkey with skin    hot dogs    St, sausage     oil packed tuna     whole eggs   low-fat, extra-lean meats,    skinless chicken or turkey    Low-fat hot dog    turkey st    water-packed tuna    egg whites, egg substitute   Baked goods   croissants     donuts    muffins,     party crackers    cake, cookies   hard rolls, English muffins    bagels    reduced fat muffins    low-fat crackers    adrian food cake   Snacks and sweets   nuts    ice cream   popcorn, fruits, vegetables    frozen yogurt, pudding bars   Fats, oils, and salad dressings   butter, margarine    mayonnaise    salad dressings    oils, shortening, lard   light margarine    light mayonnaise, mustard    fat free salad dressing    nonstick cooking spray     Reducing salt content data from  Alternatives to High-Sodium Foods,  by the U.S. Food and Drug Administration, n.d. Retrieved January 9, 2007, from http://www.fda.gov/fdac/foodlabel/sodtabl.html. Reducing fat content data from  The Practical Guide: Identification, Evaluation and Treatment of Overweight and Obesity in Adults  (NIH Publication No. ), by the National Institutes of Health, 2000. Retrieved January 9, 2007, from http://www.nhlbi.nih.gov/ guidelines/obesity/prctgd_c.pdf.

## 2020-06-29 NOTE — PROGRESS NOTES
[facility] Note    Patient: Stephanie Garcia  : 1975  MRN: [id number]         SUBJECTIVE       Stephanie Garcia is a 44 year old female with a PMH significant for:  Patient Active Problem List   Diagnosis     Encounter for initial prescription of injectable contraceptive     Essential hypertension     She presents to clinic today with chief complaint of follow-up blood pressure.    Patient has a history of elevated blood pressure.  She has had elevated blood pressure readings at home and in the office.  She was prescribed hydrochlorothiazide 12.5 mg daily approximately 1 month ago.  However before starting this medication she had several normal blood pressure readings at home.  She was recommended by the pharmacist to hold off on starting the hydrochlorothiazide and continue monitoring her blood pressure at home.    The patient does note continued chest discomfort.  She has had chest pain for the past 3 months.  Pain occurs intermittently.  It typically occurs when she is lying flat in bed at night or when she leans forward.  Pain is located in the left and center part of her chest.  Sometimes it is superficial and other times deep.  She describes the pain as sharp.  This was first thought to be related to gastroesophageal reflux and she was prescribed famotidine.  However, she did not feel any improvement in her chest pain after starting the famotidine.  She is also given a prescription for ibuprofen due to concern for costochondritis.  She experienced no relief of her symptoms with the ibuprofen.    No shortness of breath, cough, or wheezing.  No skin rash.  No lightheadedness, dizziness, fainting or syncope.  No lower extremity swelling.  No headache or vision changes.  No fever or chills.     Past Medical History, Past Surgical History, Medications, Allergies, and Family History were reviewed and updated as needed.        REVIEW OF SYSTEMS     CONSTITUTIONAL: See above.   HEENT: See  "above.   SKIN: See above.   RESPIRATORY: See above.  CARDIOVASCULAR: See above.  GASTROINTESTINAL: No nausea, vomiting, diarrhea, or constipation. No abdominal pain, cramping, or bloating.  MUSCULOSKELETAL: See above.   NEUROLOGIC: See above.         OBJECTIVE     Vitals:    06/29/20 1419 06/29/20 1425   BP: (!) 161/94 (!) 165/108   BP Location: Left arm Left arm   Patient Position: Sitting Sitting   Cuff Size: Adult Regular Adult Regular   Pulse: 88    Resp: 20    Temp: 98.9  F (37.2  C)    TempSrc: Tympanic    SpO2: 100%    Weight: 59.4 kg (131 lb)    Height: 1.537 m (5' 0.5\")      Body mass index is 25.16 kg/m .    Physical Exam:  GENERAL: Awake, alert. Well-nourished, well-developed. No acute distress. Appears comfortable.  HEENT: Head: Normocephalic and atraumatic; no dysmorphic features. Eyes: Eye lids and lashes normal; pupils equal, round and reactive to light; extra ocular eye movements intact in all directions; no scleral icterus or conjunctival injection.   SKIN: Warm and dry. No rashes, lesions, erythema, petechiae, purpura, ecchymosis, or jaundice.  CHEST: Chest appears symmetric; no pectus excavatum or carinatum. Non-tender to palpation throughout the chest.  LUNGS: No increased work of breathing; good air movement throughout all lung fields; breath sounds clear to auscultation bilaterally throughout all lung fields with no crackles or wheezing.  CARDIOVASCULAR: Regular rate and rhythm; normal S1 and S2; no S3 or S4; no murmur, rub or click. Radial and dorsalis pedis/posterior tibial pulses intact; normal capillary refill. No peripheral edema.  ABDOMEN: Non-distended. Soft and non-tender in all quadrants; no masses or hepatosplenomegally.  PSYCH: Normal affect, mood, orientation, memory and insight.    LABORATORY:  No results found for this or any previous visit (from the past 24 hour(s)).      ASSESSMENT AND PLAN     1. Essential hypertension  Presents for follow-up hypertension.  Previously " prescribed hydrochlorothiazide 12.5 mg daily for this given elevated at home and in clinic blood pressure readings.  However, never started hydrochlorothiazide after having several home blood pressure readings that were less than 140/90 mmHg.  Blood pressure today markedly elevated to 165/108 mmHg.  Dietary modifications, including DASH diet and low-sodium diet, were encouraged as well as getting at least 150 minutes of moderate intensity physical activity weekly.  Using shared decision making, the patient elected to start taking the hydrochlorothiazide 12.5 mg daily.  She will continue to check her blood pressures daily at home.  Patient was instructed to contact the clinic should blood pressure readings be less than 90/60 mmHg or if she develops lightheadedness, dizziness or presyncopal symptoms.  We will have patient return to clinic in 2 to 4 weeks for blood pressure check and BMP.    - Basic Metabolic Panel ([facility]); Future    2. Atypical chest pain  Has had approximately 3-month history of intermittent chest discomfort.  EKG revealed normal sinus rhythm with no findings consistent with acute ischemia or infarction.  No tenderness to palpation over the sternum and rib cage today lowering the suspicion for costochondritis.  Could consider gastroesophageal reflux although previously was treated empirically with famotidine without significant improvement of symptoms.  Patient is without fever, chills, dyspnea, cough which lower suspicion for pneumonia, tuberculosis and COVID-19.  Doubt pneumothorax or acute rib pathology.  Suspect her chest discomfort is of benign etiology.  Use of Tylenol as needed for pain control was encouraged and the PPI for empiric treatment of GERD was offered.  We will follow-up with her in 2 to 4 weeks to really discuss her chest symptoms.  - EKG 12-lead complete w/read - Clinics      Return to clinic in 2-4 weeks for follow-up of blood pressure and check discomfort or sooner if  develops new or worsening symptoms.    Patient was discussed with attending physician, Dr. En Alcantara MD, who agrees with the assessment and plan.    Gus Meehan, PGY-2  [facility]  6/29/2020

## 2020-06-29 NOTE — PROGRESS NOTES
Preceptor Attestation:    Patient seen and evaluated in person. I discussed the patient with the resident. I have verified the content of the note, which accurately reflects my assessment of the patient and the plan of care.   Supervising Physician:  Wan Alcantara MD.

## 2020-07-09 NOTE — PROGRESS NOTES
Preceptor Attestation:    Patient seen and evaluated in person. I discussed the patient with the resident. I personally viewed the EKG and agree with the interpretation documented by the resident. I have verified the content of the note, which accurately reflects my assessment of the patient and the plan of care.   Supervising Physician:  Wan Alcantara MD.

## 2020-07-17 ENCOUNTER — OFFICE VISIT (OUTPATIENT)
Dept: FAMILY MEDICINE | Facility: CLINIC | Age: 45
End: 2020-07-17
Payer: COMMERCIAL

## 2020-07-17 VITALS
OXYGEN SATURATION: 99 % | BODY MASS INDEX: 24.55 KG/M2 | HEART RATE: 96 BPM | HEIGHT: 61 IN | SYSTOLIC BLOOD PRESSURE: 157 MMHG | TEMPERATURE: 98.7 F | RESPIRATION RATE: 18 BRPM | WEIGHT: 130 LBS | DIASTOLIC BLOOD PRESSURE: 103 MMHG

## 2020-07-17 DIAGNOSIS — Z23 NEED FOR VACCINATION: ICD-10-CM

## 2020-07-17 DIAGNOSIS — I10 ESSENTIAL HYPERTENSION: Primary | ICD-10-CM

## 2020-07-17 LAB
BUN SERPL-MCNC: 12.5 MG/DL (ref 7–19)
CALCIUM SERPL-MCNC: 9.2 MG/DL (ref 8.5–10.1)
CHLORIDE SERPLBLD-SCNC: 98.7 MMOL/L (ref 98–110)
CO2 SERPL-SCNC: 29.8 MMOL/L (ref 20–32)
CREAT SERPL-MCNC: 0.7 MG/DL (ref 0.5–1)
GFR SERPL CREATININE-BSD FRML MDRD: >90 ML/MIN/1.7 M2
GLUCOSE SERPL-MCNC: 103.6 MG'DL (ref 70–99)
POTASSIUM SERPL-SCNC: 3.4 MMOL/L (ref 3.2–4.6)
SODIUM SERPL-SCNC: 136.6 MMOL/L (ref 132–142)

## 2020-07-17 ASSESSMENT — MIFFLIN-ST. JEOR: SCORE: 1179.12

## 2020-07-17 NOTE — PROGRESS NOTES
Preceptor Attestation:   Patient seen, evaluated and discussed with the resident. I have verified the content of the note, which accurately reflects my assessment of the patient and the plan of care.   Supervising Physician:  Alina Cyr MD

## 2020-07-17 NOTE — PROGRESS NOTES
"[facility] Medicine Clinic Note    Patient: Stephanie Garcia  : 1975  MRN: [id number]         SUBJECTIVE       Stephanie Garcia is a 44 year old female with a PMH significant for:  Patient Active Problem List   Diagnosis     Encounter for initial prescription of injectable contraceptive     Essential hypertension     She presents to clinic today with chief complaint of follow-up hypertension.    The patient was started on hydrochlorothiazide 12.5 mg daily 2 weeks ago for elevated blood pressure.    Patient reports no issues taking the hydrochlorothiazide.  She has not missed any doses.  She takes the hydrochlorothiazide in the morning.  Tolerates this without back, vomiting, abdominal pain, skin rash.  No increase area frequency.  No lightheadedness, dizziness or faintness.    The patient has been checking her blood sugars daily at home.  Pressures have ranged from 120-150 systolic and  diastolic.  Most blood pressures have been 130s/80s.    No chest pain or chest discomfort no shortness of breath, cough or wheezing.      Past Medical History, Past Surgical History, Medications, Allergies, and Family History were reviewed and updated as needed.        REVIEW OF SYSTEMS     CONSTITUTIONAL: See above.   SKIN: See above.   RESPIRATORY: See above.   CARDIOVASCULAR: See above. No palpitations or irregular heart beats. No syncope. No lower extremity swelling.  GASTROINTESTINAL: See above.   GENITOURINARY: See above.         OBJECTIVE     Vitals:    20 1318 20 1322   BP: (!) 159/96 (!) 157/103   BP Location: Left arm Left arm   Patient Position: Sitting Sitting   Cuff Size: Adult Regular Adult Regular   Pulse: 96    Resp: 18    Temp: 100  F (37.8  C)    TempSrc: Tympanic    SpO2: 99%    Weight: 59 kg (130 lb)    Height: 1.537 m (5' 0.5\")      Body mass index is 24.97 kg/m .    Physical Exam:  GENERAL: Awake, alert. Well-nourished, well-developed. No acute distress. Appears " comfortable.  HEENT: Head: Normocephalic and atraumatic; no dysmorphic features.   SKIN: Warm and dry.   LUNGS: No increased work of breathing; good air movement throughout all lung fields; breath sounds clear to auscultation bilaterally throughout all lung fields with no crackles or wheezing.  CARDIOVASCULAR: Regular rate and rhythm; normal S1 and S2; no S3 or S4; no murmur, rub or click.  Radial and dorsalis pedis/posterior tibial pulses intact; normal capillary refill. No peripheral edema.  PSYCH: Normal affect, mood, orientation, memory and insight.    LABORATORY:  No results found for this or any previous visit (from the past 24 hour(s)).      ASSESSMENT AND PLAN     1. Essential hypertension  Patient presents for blood pressure recheck.  We will did hydrochlorothiazide 12.5 mg daily 2.5 weeks ago.  Tolerating this medication without adverse side effects.  Blood pressure checks at home have revealed blood pressure typically in the 130s/80s mmHg.  Blood pressure in clinic today markedly elevated to 159/96 mmHg.  Given the relatively good controlled blood pressure at home we will continue hydrochlorothiazide 12.5 mg daily for now.  We will check BMP today to evaluate for electrolyte disturbance and renal dysfunction.  Encourage patient to pursue low-sodium diet as well as achieve 150 minutes of moderate intensity physical activity weekly.  Did discuss with patient that she could take her hydrochlorothiazide at bedtime.  EKG obtained at last visit revealed normal sinus rhythm with no findings of LVH or other cardiomyopathy.      Return to clinic in 1-2 months for follow-up of blood pressure .    Patient was discussed with attending physician, Dr. Alina Cyr MD, who agrees with the assessment and plan.    Gus Meehan, PGY-3  [facility]  7/17/2020

## 2020-07-17 NOTE — RESULT ENCOUNTER NOTE
I spoke with the patient via telephone call and communicated these results.    Gus Meehan MD  July 17, 2020

## 2020-07-17 NOTE — PATIENT INSTRUCTIONS
Plan:  1. It was a pleasure seeing you in clinic today.  2. Continue taking hydrochlorothiazide 12.5 mg (1 tablet) daily. Bedtime preferred but morning is okay too.  3. Stop by lab on your way out for electrolytes and kidney function test.  4. Continue checking blood pressures at home every day.       [facility]  Phone: [phone]  Address: [address] [place] [address]      Strategies to Lower Blood Pressure    Dietary Approaches to Stop Hypertension (DASH) diet is a healthy diet specifically designed and tested for lowering blood pressure.    DASH diet is high in dietary fiber, moderate in total fat and protein, and low in saturated fat, dietary cholesterol, and sodium.    DASH diet emphasizes increased fruits, vegetables, whole grains, low-fat dairy products, legumes, nuts, seeds, and lean meats.    Other strategies that effectively lower blood pressure include avoiding cigarette use, reducing alcohol consumption, and getting 30-60 minutes of moderate intensity exercise at least 3-5 days per week.    Medications known as antihypertensives can also be used in combination with the above strategies to lower blood pressure.      Dietary Changes  To help reduce blood pressure, it is recommended that individuals follow a low sodium (low salt) diet and low fat diet. Below are alternatives to high-sodium and high-fat foods.    Reducing Salt Content  Foods high in salt (sodium) Low-salt alternatives*     smoked, cured, salted, and canned meat, fish, poultry   unsalted fresh or frozen beef, lamb, pork, fish, poultry     regular hard and processed cheese    regular peanut butter   low-sodium cheese    low-sodium peanut butter     crackers with salted tops   unsalted crackers     regular canned and dehydrated soups    low-sodium soups, broths, bouillons     regular canned vegetables   fresh and frozen vegetables      salted snack foods   unsalted snack foods       Reducing Fat Content  Food  category Foods high in fat Lower fat alternatives*   Dairy    whole milk    ice cream    cheese   skim, 1%, 2% milk    sorbet, sherbert, frozen yogurt    low- or reduced-fat cheese   Pasta   ramen noodles    pasta with cream sauce    granola   rice    pasta with tomato sauce    reduced fat granola   Meat, fish, poultry   ground beef    chicken or turkey with skin    hot dogs    St, sausage     oil packed tuna     whole eggs   low-fat, extra-lean meats,    skinless chicken or turkey    Low-fat hot dog    turkey st    water-packed tuna    egg whites, egg substitute   Baked goods   croissants     donuts    muffins,     party crackers    cake, cookies   hard rolls, English muffins    bagels    reduced fat muffins    low-fat crackers    adrian food cake   Snacks and sweets   nuts    ice cream   popcorn, fruits, vegetables    frozen yogurt, pudding bars   Fats, oils, and salad dressings   butter, margarine    mayonnaise    salad dressings    oils, shortening, lard   light margarine    light mayonnaise, mustard    fat free salad dressing    nonstick cooking spray     Reducing salt content data from  Alternatives to High-Sodium Foods,  by the U.S. Food and Drug Administration, n.d. Retrieved January 9, 2007, from http://www.fda.gov/fdac/foodlabel/sodtabl.html. Reducing fat content data from  The Practical Guide: Identification, Evaluation and Treatment of Overweight and Obesity in Adults  (NIH Publication No. ), by the National Institutes of Health, 2000. Retrieved January 9, 2007, from http://www.nhlbi.nih.gov/ guidelines/obesity/prctgd_c.pdf.

## 2020-08-18 ENCOUNTER — TELEPHONE (OUTPATIENT)
Dept: FAMILY MEDICINE | Facility: CLINIC | Age: 45
End: 2020-08-18

## 2020-08-18 DIAGNOSIS — I10 ESSENTIAL HYPERTENSION: ICD-10-CM

## 2020-08-18 RX ORDER — HYDROCHLOROTHIAZIDE 12.5 MG/1
12.5 TABLET ORAL DAILY
Qty: 90 TABLET | Refills: 0 | Status: SHIPPED | OUTPATIENT
Start: 2020-08-18 | End: 2020-10-12

## 2020-08-18 NOTE — TELEPHONE ENCOUNTER
[facility] phone call message- patient requesting a refill:    Full Medication Name:     hydrochlorothiazide (HYDRODIURIL) 12.5 MG tablet   Take 1 tablet (12.5 mg) by mouth daily - Oral     Pharmacy confirmed as   [facility] - [address]  [address]  [facility] [address]  Phone: [phone] Fax: [phone]  : Yes    Additional Comments:     Please call pt when prescription is sent to pharmacy.    Please call pt when prescription when     OK to leave a message on voice mail? Yes    Primary language: English      needed? No    Call taken on August 18, 2020 at 11:33 AM by Valerie Pichardo CMA

## 2020-08-18 NOTE — TELEPHONE ENCOUNTER
Patient needs OV in the next 2-3 weeks to follow up on hypertension. Refilled for 3 month supply as patient has a mail order pharmacy.    Routed to MA to assist with scheduling.    Khadijah Solares MD, PGY3

## 2020-08-19 NOTE — TELEPHONE ENCOUNTER
Called patient, she is notify of needed ov for follow-up htn 2-3wk. Patient need to check her schedule will call clinic back to schedule ov. Adry Montejo CMA

## 2020-09-18 ENCOUNTER — ALLIED HEALTH/NURSE VISIT (OUTPATIENT)
Dept: FAMILY MEDICINE | Facility: CLINIC | Age: 45
End: 2020-09-18
Payer: COMMERCIAL

## 2020-09-18 VITALS — TEMPERATURE: 98.1 F

## 2020-09-18 DIAGNOSIS — Z23 NEED FOR PROPHYLACTIC VACCINATION AND INOCULATION AGAINST INFLUENZA: Primary | ICD-10-CM

## 2020-10-11 DIAGNOSIS — I10 ESSENTIAL HYPERTENSION: ICD-10-CM

## 2020-10-12 RX ORDER — HYDROCHLOROTHIAZIDE 12.5 MG/1
TABLET ORAL
Qty: 90 TABLET | Refills: 0 | Status: SHIPPED | OUTPATIENT
Start: 2020-10-12 | End: 2020-10-14

## 2020-10-14 ENCOUNTER — OFFICE VISIT (OUTPATIENT)
Dept: FAMILY MEDICINE | Facility: CLINIC | Age: 45
End: 2020-10-14
Payer: COMMERCIAL

## 2020-10-14 VITALS
BODY MASS INDEX: 25.05 KG/M2 | DIASTOLIC BLOOD PRESSURE: 100 MMHG | SYSTOLIC BLOOD PRESSURE: 147 MMHG | TEMPERATURE: 98.8 F | WEIGHT: 130.4 LBS | HEART RATE: 101 BPM | OXYGEN SATURATION: 100 % | RESPIRATION RATE: 16 BRPM

## 2020-10-14 DIAGNOSIS — I10 ESSENTIAL HYPERTENSION: ICD-10-CM

## 2020-10-14 PROCEDURE — 99213 OFFICE O/P EST LOW 20 MIN: CPT | Mod: GC | Performed by: STUDENT IN AN ORGANIZED HEALTH CARE EDUCATION/TRAINING PROGRAM

## 2020-10-14 RX ORDER — HYDROCHLOROTHIAZIDE 12.5 MG/1
12.5 TABLET ORAL DAILY
Qty: 90 TABLET | Refills: 3 | Status: SHIPPED | OUTPATIENT
Start: 2020-10-14 | End: 2021-11-19

## 2020-10-14 NOTE — PROGRESS NOTES
[facility] Medicine Clinic Note    Patient: Stephanie Garcia  : 1975  MRN: [id number]         SUBJECTIVE       Stephanie Garcia is a 45 year old female with a PMH significant for:  Patient Active Problem List   Diagnosis     Encounter for initial prescription of injectable contraceptive     Essential hypertension     She presents to clinic today with chief complaint of blood pressure follow-up.    Has history of hypertension. Currently on hydrochlorothiazide 12.5 mg daily. Adherent to hydrochlorothiazide; no adverse side effects appreciated. Checks blood pressure daily at home. Blood pressures have ranged from 96/71 mmHg to 110/85 mmHg on home monitoring.     No chest pain, lightheadedness, dizziness, presyncope or syncope. No lower extremity edema. No palpitations or irregular heart beats.    Past Medical History, Past Surgical History, Medications, Allergies, and Family History were reviewed and updated as needed.        REVIEW OF SYSTEMS     CONSTITUTIONAL: No fever or chills.  SKIN: No rashes, bruises, jaundice, or skin lesions.  RESPIRATORY: No cough, wheezes, or shortness of breath.  CARDIOVASCULAR: See above.   GASTROINTESTINAL: No nausea, vomiting, reflux, diarrhea, or constipation. No abdominal pain, cramping, or bloating.        OBJECTIVE     Vitals:    10/14/20 1312 10/14/20 1316   BP: (!) 160/94 (!) 147/100   BP Location: Left arm Left arm   Patient Position: Sitting Sitting   Cuff Size: Adult Regular Adult Regular   Pulse: 101 101   Resp: 16    Temp: 98.8  F (37.1  C)    TempSrc: Oral    SpO2: 100%    Weight: 59.1 kg (130 lb 6.4 oz)      Body mass index is 25.05 kg/m .    Physical Exam:  GENERAL: Awake, alert. Well-nourished, well-developed. No acute distress. Appears comfortable.  LUNGS: No increased work of breathing; good air movement throughout all lung fields; breath sounds clear to auscultation bilaterally throughout all lung fields with no crackles or wheezing.  CARDIOVASCULAR:  Regular rate and rhythm; normal S1 and S2; no S3 or S4; no murmur, rub or click. Radial and dorsalis pedis/posterior tibial pulses intact; normal capillary refill. No peripheral edema.  PSYCH: Normal affect, mood, orientation, memory and insight.    LABORATORY:  No results found for this or any previous visit (from the past 24 hour(s)).      ASSESSMENT AND PLAN     1. Essential hypertension  Currently well-controlled on hydrochlorothiazide 12.5 mg daily. Home blood pressures ranging from 96/71 mmHg to 110/85 mmHg. In office blood pressure slightly high but suspect white coat HTN versus anxiety versus stress. Will continue with hydrochlorothiazide 12.5 mg daily for now.  - hydrochlorothiazide (HYDRODIURIL) 12.5 MG tablet; Take 1 tablet (12.5 mg) by mouth daily  Dispense: 90 tablet; Refill: 3    Return to clinic in 3 months for follow-up of preventive care visit.    Patient was discussed with attending physician, Dr. Morales Kee MD, who agrees with the assessment and plan.    Gus Meehan, PGY-3  [facility]  10/14/2020

## 2020-10-14 NOTE — PATIENT INSTRUCTIONS
For cold medications:  1. Avoid medications with Pseudoephedrine  2. Guaifenesin, Delsym, Robitussin, Mucinex, DayQuil and NyQuil are good      Continue with hydrochlorothiazide 12.5 mg daily (1 pill)    Continue checking blood pressure daily

## 2020-10-14 NOTE — PROGRESS NOTES
Preceptor Attestation:    Patient seen and evaluated in person. I discussed the patient with the resident. I have verified the content of the note, which accurately reflects my assessment of the patient and the plan of care.   Supervising Physician:  Morales Kee MD.

## 2020-11-29 ENCOUNTER — HEALTH MAINTENANCE LETTER (OUTPATIENT)
Age: 45
End: 2020-11-29

## 2021-02-02 ENCOUNTER — OFFICE VISIT (OUTPATIENT)
Dept: FAMILY MEDICINE | Facility: CLINIC | Age: 46
End: 2021-02-02
Payer: COMMERCIAL

## 2021-02-02 VITALS
BODY MASS INDEX: 25.74 KG/M2 | DIASTOLIC BLOOD PRESSURE: 95 MMHG | RESPIRATION RATE: 20 BRPM | OXYGEN SATURATION: 99 % | SYSTOLIC BLOOD PRESSURE: 142 MMHG | WEIGHT: 134 LBS | HEART RATE: 80 BPM | TEMPERATURE: 98.1 F

## 2021-02-02 DIAGNOSIS — Z11.59 ENCOUNTER FOR HEPATITIS C SCREENING TEST FOR LOW RISK PATIENT: ICD-10-CM

## 2021-02-02 DIAGNOSIS — M94.0 COSTOCHONDRITIS: Primary | ICD-10-CM

## 2021-02-02 DIAGNOSIS — T14.8XXA MUSCULOSKELETAL STRAIN: ICD-10-CM

## 2021-02-02 DIAGNOSIS — I10 ESSENTIAL HYPERTENSION: ICD-10-CM

## 2021-02-02 DIAGNOSIS — R20.2 PARESTHESIA OF BOTH HANDS: ICD-10-CM

## 2021-02-02 PROBLEM — Z30.013 ENCOUNTER FOR INITIAL PRESCRIPTION OF INJECTABLE CONTRACEPTIVE: Status: RESOLVED | Noted: 2018-01-31 | Resolved: 2021-02-02

## 2021-02-02 LAB
ANION GAP SERPL CALCULATED.3IONS-SCNC: 10 MMOL/L (ref 5–18)
BUN SERPL-MCNC: 15 MG/DL (ref 8–22)
CALCIUM SERPL-MCNC: 8.8 MG/DL (ref 8.5–10.5)
CHLORIDE SERPL-SCNC: 102 MMOL/L (ref 98–107)
CO2 SERPL-SCNC: 25 MMOL/L (ref 22–31)
CREAT SERPL-MCNC: 0.69 MG/DL (ref 0.6–1.1)
GLUCOSE SERPL-MCNC: 95 MG/DL (ref 70–125)
POTASSIUM SERPL-SCNC: 4 MMOL/L (ref 3.5–5)
SODIUM SERPL-SCNC: 137 MMOL/L (ref 136–145)

## 2021-02-02 PROCEDURE — 99213 OFFICE O/P EST LOW 20 MIN: CPT | Mod: GC | Performed by: STUDENT IN AN ORGANIZED HEALTH CARE EDUCATION/TRAINING PROGRAM

## 2021-02-02 PROCEDURE — 36415 COLL VENOUS BLD VENIPUNCTURE: CPT | Performed by: STUDENT IN AN ORGANIZED HEALTH CARE EDUCATION/TRAINING PROGRAM

## 2021-02-02 RX ORDER — ACETAMINOPHEN 500 MG
500-1000 TABLET ORAL EVERY 6 HOURS PRN
Qty: 100 TABLET | Refills: 0 | Status: SHIPPED | OUTPATIENT
Start: 2021-02-02 | End: 2022-04-15

## 2021-02-02 NOTE — PROGRESS NOTES
Preceptor Attestation:    Patient seen and evaluated in person. I discussed the patient with the resident. I have verified the content of the note, which accurately reflects my assessment of the patient and the plan of care.   Supervising Physician:  Sukhi Toribio MD.

## 2021-02-02 NOTE — PATIENT INSTRUCTIONS
Take ibuprofen or tylenol every 4-6 hours as needed for muscle pain.   Avoid heavy lifting or exercises that may exacerbate your shoulder or back pain.   If back pain or hand tingling does not resolve in 1-2 months, please call the clinic to schedule a follow-up appointment.      Patient Education     Chest Wall Pain: Costochondritis    The chest pain that you have had today is caused by costochondritis. This condition is caused by an inflammation of the cartilage joining your ribs to your breastbone. It's not caused by heart or lung problems. Your healthcare team has made sure that the chest pain you feel is not from a life threatening cause of chest pain such as heart attack, collapsed lung, blood clot in the lung, tear in the aorta, or esophageal rupture. The inflammation may have been brought on by a blow to the chest, lifting heavy objects, intense exercise, or an illness that made you cough and sneeze a lot. It often occurs during times of emotional stress. It can be painful, but it's not dangerous. It usually goes away in 1 to 2 weeks. But it may happen again. Rarely, a more serious condition may cause symptoms similar to costochondritis. That s why it s important to watch for the warning signs listed below.   Home care  Follow these guidelines when caring for yourself at home:    If you feel that emotional stress is a cause of your condition, try to figure out the sources of that stress. It may not be obvious. Learn ways to deal with the stress in your life. This can include regular exercise, muscle relaxation, meditation, or simply taking time out for yourself.    You may use acetaminophen, ibuprofen, or naproxen to control pain, unless another pain medicine was prescribed. If you have liver or kidney disease or ever had a stomach ulcer, talk with your healthcare provider before using these medicines.    You can also help ease pain by using a hot, wet compress or heating pad. Use this with or without a  medicated skin cream that helps relieves pain.    Do stretching exercise as advised by your provider. Typically rest is beneficial for the first few days. Avoid strenuous activity that worsens the pain.    Take any prescribed medicines as directed.  Follow-up care  Follow up with your healthcare provider, or as advised.   When to seek medical advice  Call your healthcare provider right away if any of these occur:    A change in the type of pain. Call if it feels different, becomes more serious, lasts longer, or spreads into your shoulder, arm, neck, jaw, or back.    Shortness of breath or pain gets worse when you breathe    Weakness, dizziness, or fainting    Cough with dark-colored sputum (phlegm) or blood    Abdominal pain    Dark red or black stools    Fever of 100.4 F (38 C) or higher, or as directed by your healthcare provider  [employer] last reviewed this educational content on 6/1/2019 2000-2020 [employer]. [address] [place] [address]. All rights reserved. This information is not intended as a substitute for professional medical care. Always follow your healthcare professional's instructions.         Patient Education     Costochondritis  Costochondritis is inflammation of a rib or the cartilage that connects a rib to your breastbone (sternum). It causes soreness, and may cause chest pain that can be sharp or aching or feel like pressure. The pain may get worse with deep breathing, movement, or exercise. In some cases, the pain is mistaken for a heart attack. But the condition is not serious. Read on to learn more about the condition and how it can be treated.   What causes costochondritis?  The cause is not fully known. It may happen after a chest injury, chest infection, or bout of coughing. Some physical activities may lead to costochondritis. Large-breasted women may be more likely to have the condition. Often, the cause is unknown.   Diagnosing costochondritis  There is no  test for costochondritis. The condition is diagnosed by the symptoms you have. Your healthcare provider will give you a physical exam. He or she will ask you about your symptoms and examine your chest for pain. In some cases, tests are done to rule out more serious problems. These tests may include tests such as chest X-ray, CT scan, or an ECG.   Treating costochondritis  If a cause is found, treatment for that will likely relieve the problem. Costochondritis often goes away on its own. The course of the condition varies from person to person. It usually lasts from weeks to months. In some cases, mild symptoms continue for months to years. To ease symptoms:     Take medicine as directed. These relieve pain and swelling. Ibuprofen or other NSAIDs are often advised. In some cases, you may be given prescription medicine, such as muscle relaxants.    Don't do activities that put stress on your chest or spine.    Apply a heating pad (set to warm, not high heat) to your breastbone several times a day.    Do stretching exercises as directed.  When to call the healthcare provider  Call the healthcare provider right away if you have any of these:    Pain that is not relieved by medicine    Shortness of breath    Lightheadedness, dizziness, or fainting    Feeling of irregular heartbeat or fast pulse  Anyone with chest pain should see a healthcare provider, especially older adults and people at risk for heart disease.   [employer] last reviewed this educational content on 2/1/2020 2000-2020 [employer]. [address] [place] [address], [place] [address]. All rights reserved. This information is not intended as a substitute for professional medical care. Always follow your healthcare professional's instructions.         Patient Education     General Neck and Back Pain    Both neck and back pain are usually caused by injury to the muscles or ligaments of the spine. Sometimes the disks that separate each bone of the spine  may cause pain by pressing on a nearby nerve. Back and neck pain may appear after a sudden twisting or bending force (such as in a car accident), or sometimes after a simple awkward movement. In either case, muscle spasm is often present and adds to the pain.   Acute neck and back pain usually gets better in 1 to 2 weeks. Pain related to disk disease, arthritis in the spinal joints, or narrowing of the spinal canal (spinal stenosis) can become chronic and last for months or years.   Back and neck pain are common problems. Most people feel better in 1 or 2 weeks, and most of the rest in 1 to 2 months. Most people can remain active.   People have and describe pain differently.    Pain can be sharp, stabbing, shooting, aching, cramping, or burning    Movement, standing, bending, lifting, sitting, or walking may worsen the pain    Pain can be limited to one spot or area, or it can be more generalized    Pain can spread upward, downward, to the front, or go down your arms or legs    Muscle spasm may occur.  Most of the time mechanical problems with the muscles or spine cause the pain. It's usually caused by an injury, whether known or not, to the muscles or ligaments. Pain without an injury is not common. But it can sometimes be caused by a health problem such as kidney stones or an infection. Pain is usually related to physical activity such as sports, exercise, work, or normal activity. Sometimes it can occur without an identifiable cause. This can happen simply by stretching or moving wrong, without noting pain at the time. Other causes include:     Overexertion, lifting, pushing, pulling incorrectly or too aggressively.    Sudden twisting, bending or stretching from an accident (car or fall), or accidental movement.    Poor posture    Poor conditioning, lack of regular exercise    Spinal disc disease or arthritis    Stress    Pregnancy, or illness like appendicitis, bladder or kidney infection, pelvic  infections   Home care    For neck pain: Use a comfortable pillow that supports the head and keeps the spine in a neutral position. The position of the head should not be tilted forward or backward.    When in bed, try to find a position of comfort. A firm mattress is best. Try lying flat on your back with pillows under your knees. You can also try lying on your side with your knees bent up towards your chest and a pillow between your knees.    At first, don't try to stretch out the sore spots. If there is a strain, it's not like the good soreness you get after exercising without an injury. In this case, stretching may make it worse.    Don't sit for long periods, as in long car rides or other travel. This puts more stress on the lower back than standing or walking.    During the first 24 to 72 hours after an injury, apply an ice pack to the painful area for 20 minutes and then remove it for 20 minutes over a period of 60 to 90 minutes or several times a day.     You can alternate ice and heat therapies. Talk with your healthcare provider about the best treatment for your back or neck pain. As a safety precaution, don't use a heating pad at bedtime. Sleeping with a heating pad can lead to skin burns or tissue damage.    Therapeutic massage can help relax the back and neck muscles without stretching them.    Be aware of safe lifting methods and don't lift anything over 15 pounds until all the pain is gone.    Medicines  Talk to your healthcare provider before using medicine, especially if you have other medical problems or are taking other medicines.     You may use over-the-counter medicine to control pain, unless another pain medicine was prescribed. Talk with your doctor first if you have chronic conditions like diabetes, liver or kidney disease, stomach ulcers, gastrointestinal bleeding, or are taking blood thinner medicines.    Be careful if you are given pain medicines, narcotics, or medicine for muscle spasm.  They can cause drowsiness, and can affect your coordination, reflexes, and judgment. Don't drive or operate heavy machinery.  Follow-up care  Follow up with your healthcare provider, or as advised. You may need physical therapy or further tests.   If X-rays were taken, you will be told of any new findings that may affect your care.   Call 911  Call 911 if any of the following occur:     Trouble breathing    Confusion    Very drowsy or trouble awakening    Fainting or loss of consciousness    Rapid or very slow heart rate    Loss of bowel or bladder control  When to seek medical advice  Call your healthcare provider right away if any of these occur:    Pain becomes worse or spreads into your arms or legs    Weakness, numbness or pain in one or both arms or legs    Numbness in the groin area    Trouble walking    Fever of 100.4 F (38 C) or higher, or as directed by your healthcare provider  Rosemarie last reviewed this educational content on 10/1/2019    4345-2981 [employer]. [address] [place] [address], [place] [address]. All rights reserved. This information is not intended as a substitute for professional medical care. Always follow your healthcare professional's instructions.

## 2021-02-02 NOTE — PROGRESS NOTES
Assessment & Plan     Costochondritis  Similar to prior visit, suspect at this time the chest discomfort is likely musculoskeletal in origin. Patient was previously treated with pepcid for possible GERD with no relief. EKG revealed normal sinus rhythm with no findings consistent with acute ischemia or infarction previously. Patient does not endorse chest pressure or radiation to jaw, arms. Given clinical history, severity of the discomfort, and short duration, cardiac origin thought unlikely. No pain with inspiration, leaning forward. Patient has not had any recent illnesses, fevers, cough; pulmonary origin less likely. As the pain occurs during the daytime as well as night time and is brief in duration, more likely costochondritis. Encouraged tylenol for pain and warm compresses. Should pain not resolve, could consider additional work up.   - acetaminophen (TYLENOL) 500 MG tablet; Take 1-2 tablets (500-1,000 mg) by mouth every 6 hours as needed for mild pain    Musculoskeletal strain  While no preceding trauma or acute event, suspect back/shoulder pain is likely secondary to muscle strain. Patient does not do heavy lifting, but has a three year old at home. As the pain is intermittent, encouraged tylenol if needed and warm compresses. If discomfort/pain worsens, encouraged patient to return to clinic. Consider PT if not improving or imaging of neck if more paraesthesias occur.  - Tylenol PRN     Paresthesia of both hands  As the tingling has preceded her back pain, but does not bother her, will continue to monitor. Possibly related to carpal tunnel or sleeping position, though negative Tinel's and Phanel's sign on exam today. Nerve impingement also on the differential. Will continue to monitor and if symptoms to not improve, encouraged patient to return to clinic. Additional imaging or a wrist brace/splint may be indicated at that time.   - Continue to monitor    Essential hypertension  Per patient, her blood  pressures at home have all been within normal/goal range of <140/80. Suspect white-coat increase in BP at recent visits. Encouraged patient to follow-up with PCP. Will check BMP today to assess renal function.   - Basic Metabolic Profile ([facility])  - Continue current medications per PCP    Encounter for hepatitis C screening test for low risk patient  Patient agreeable to preventive care lab today.  - Hepatitis C Antibody ([facility])    Return in about 4 weeks (around 3/2/2021), or if symptoms worsen or fail to improve.    Yocasta Pedraza, MS4    I was present with the medical student who participated in the service and in the documentation of this note. I have verified the history and personally performed the physical exam and medical decision making, and have verified the content of the note, which accurately reflects my assessment of the patient and the plan of care.    Khadijah Solares MD      Patient discussed with Dr. Toribio who agrees with the above assessment and plan.     Khadijah Solares MD, PGY3  [facility]      Subjective     Stephanie is a 45 year old who presents to clinic today for the following health issues: chest discomfort, back pain, hand tingling.    Stephanie's main concern today is the chest discomfort. She has has seen providers in the past for this issue, but the chest discomfort has not resolved. Her main concern is that she may be having a heart attack. Stephanie reports no prior personal or family history of heart attacks, but has a three year old at home and is worried. The chest discomfort started last May; in between July-August the pain/discomfort was gone, but came back Sept-Oct and it usually occurs every few days. The discomfort usually occurs at night, but can occur in the daytime. It lasts for a few seconds to minutes and then resolves. Stephanie will try turning positions at night and this sometimes helps resolve the pain/discomfort, but otherwise she has not noticed anything that  resolves or exacerbates it. The pain used to be left-sided, but now is occasionally right-sided. At its worst, the pain is a 4/10. Previously, Stephanie was started on pepcid for possible acid reflux, but the symptom did not resolve. At her last visit, it was thought that the discomfort may be related to costochondritis and Stephanie was prescribed ibuprofen, but she has not taken any.     Back pain: Stephanie reports that she developed back/shoulder pain approximately one week ago. She can point to a specific part of her back (L-sided scapula and lat dorsi) that has pain. She notices it mostly during the night, but occasionally during the day. It will last for a few seconds to minutes, but then self-resolve. At it's worst, the pain is a 4/10 in severity. She has not had any recent accidents, falls, trauma, and has no neck pain/injuries. 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 She has not taken any pain medications for the pain.     Hand tingling: Stephanie noticed that since November she has had hand tingling at night. This tingling started before she developed this recent back pain. The tingling just affects one hand at a time, but has been present in both hands. It does not occur every night, but intermittently. The tingling usually self-resolves within a few minutes. She is unsure if it is related to her sleeping position (arm above head). No recent neck/elbow injuries.     Back Pain  Onset/Duration: 1 week  Description:   Location of pain: upper left back and left shoulder  Character of pain: dull ache, sharp, intermittent and waxing and waning  Pain radiation: none  New numbness or weakness in legs, not attributed to pain: no   Intensity: Currently 0/10, At its worst 4/10  Progression of Symptoms: same  History:   Specific cause: none  Pain interferes with job:  no   History of back problems: no prior back problems  Any previous MRI  or X-rays: None  Sees a specialist for back pain: No  Alleviating factors:   Improved by: rest    Precipitating factors:  Worsened by: Lifting, bending  Therapies tried and outcome: none    Accompanying Signs & Symptoms:  Risk of Fracture: None  Risk of Cauda Equina: None  Risk of Infection: None  Risk of Cancer: None  Risk of Ankylosing Spondylitis: Onset at age <35, male, AND morning back stiffness no    Chest Pain  Onset/Duration: Started back in May 2020, has been ongoing for several months, randomly occurs, lasts for a few seconds.   Description:   Location: right side (used to be left sided)  Character: sharp  Radiation: none  Duration: few seconds, intermittent  Intensity: mild, moderate  Progression of Symptoms: same  Accompanying Signs & Symptoms:  Shortness of breath: no  Sweating: no  Nausea/vomiting: no  Lightheadedness: no  Palpitations: no  Fever/Chills: no  Cough: no           Heartburn: no  History:   Family history of heart disease: no  Tobacco use: no  Previous similar symptoms: YES  Precipitating factors:   Worse with exertion: no  Worse with deep breaths: no           Related to eating: no           Better with burping: no  Alleviating factors: unknown, sometimes changing positions can help  Therapies tried and outcome: tried pepcid in the past, which did not help. Has not taken any other medications for this.     Hypertension Follow-up      Do you check your blood pressure regularly outside of the clinic? Yes     Are you following a low salt diet? Yes    Are your blood pressures ever more than 140 on the top number (systolic) OR more   than 90 on the bottom number (diastolic), for example 140/90? Yes    How many servings of fruits and vegetables do you eat daily?  2-3    On average, how many sweetened beverages do you drink each day (Examples: soda, juice, sweet tea, etc.  Do NOT count diet or artificially sweetened beverages)?   2    How many days per week do you exercise enough to make your  heart beat faster? 7    How many minutes a day do you exercise enough to make your heart beat faster? 30 - 60  How many days per week do you miss taking your medication? 1    What makes it hard for you to take your medications?  remembering to take    Review of Systems   Constitutional, HEENT, cardiovascular, pulmonary, gi and gu systems are negative, except as otherwise noted.      Objective    There were no vitals taken for this visit.  There is no height or weight on file to calculate BMI.  Physical Exam   GENERAL: healthy, alert and no distress  RESP: lungs clear to auscultation - no rales, rhonchi or wheezes  CV: regular rate and rhythm, normal S1 S2, no S3 or S4, no murmur, click or rub, no peripheral edema and peripheral pulses strong  MS: No gross musculoskeletal defects noted. Normal range of motion at neck, shoulders, elbows. Strength 5/5 at shoulders, elbows, wrists. Slightly + empty can test. Shoulders, spine, chest non-tender to palpation throughout.   PSYCH: mentation appears normal, affect normal/bright  NEURO: Negative Tinel's, Phalen's sign bilaterally. +2 biceps, brachioradialis, patellar reflexes bilaterally.

## 2021-02-03 LAB — HCV AB SER QL: NEGATIVE

## 2021-02-03 NOTE — RESULT ENCOUNTER NOTE
Results relayed via [employer].    Dear Stephanie,    Your kidney function and electrolytes remain normal. The screening test we did for hepatitis C was also negative. Please call the clinic at [phone] if your pain is not improving or if you have other questions or concerns.    Sincerely,    Dr. Khadijah Solares

## 2021-02-08 ENCOUNTER — TELEPHONE (OUTPATIENT)
Dept: FAMILY MEDICINE | Facility: CLINIC | Age: 46
End: 2021-02-08

## 2021-02-08 NOTE — TELEPHONE ENCOUNTER
[facility] phone call message- patient requesting results:    Test: LAB    Date of test: 2/2/2021    Additional Comments: Please give pt a call with results.     OK to leave a message on voice mail? Yes    Primary language: English      needed? No    Call taken on February 8, 2021 at 4:48 PM by Grisel Flores-Cardona

## 2021-05-28 ENCOUNTER — RECORDS - HEALTHEAST (OUTPATIENT)
Dept: ADMINISTRATIVE | Facility: CLINIC | Age: 46
End: 2021-05-28

## 2021-06-25 NOTE — PROGRESS NOTES
Progress Notes by Kushal Garner MD at 2017  8:07 AM     Author: Kushal Garner MD Service: Resident Author Type: Resident    Filed: 2017 11:35 AM Date of Service: 2017  8:07 AM Status: Attested    : Kushal Garner MD (Resident) Cosigner: Rachael Reyes MD at 2017  5:56 PM    Attestation signed by Rachael Reyes MD at 2017  5:56 PM    2017  [facility] Faculty Attestation  I have seen and examined the patient.  I have discussed the case with the resident physician(s), Dr. Garner.  I agree with the findings, assessment and plan.    Rachael Reyes MD                          [facility] OB Triage    Subjective: Stephanie Garcia is a  41 y.o. female at 32w5d with a current prenatal history significant for AMA who presents to OB triage with concerns for ROM. Patient reports that at about 4 am this morning she woke and felt some wetness in her underwear. She smelled it and thought it might be urine but a few hours later she felt the same leak again. She reports that it was clear and very minimal. Prior to this she has had minimal whitish discharge that has been normal for her throughout her pregnancy. Patient denies any bleeding, dysuria, hematuria, abdominal pain, fevers or chills. She also denies any RUQ or SOB. She endorses some edema of both UE and LE, notes occasional headaches that have resolved after a brief course and one episode of blurry vision early on in pregnancy that resolved as well. She denies any other complications with this or her previous pregnancy. She gets prenatal care from Dr. Ward at [facility]. She denies any bleeding, gush of fluid. Has been feeling baby move and endorses occasional alber turner.    Estimated Date of Delivery: 17 Patient's last menstrual period was 2016.  OB provider: Nelia Ward MD  OB History      Para Term  AB Living    2 1 1   1    SAB TAB Ectopic Multiple Live Births        1         Objective:   Last menstrual period 2016.  General:   alert, appears stated age and cooperative   Skin:   normal and no rash or abnormalities   HEENT:  PERRLA, extra ocular movement intact and sclera clear, anicteric   Lungs:   clear to auscultation bilaterally   Heart:   regular rate and rhythm, S1, S2 normal, no murmur, click, rub or gallop   Extremities: Warm, dry and well perfused. No edema.   Neuro: Reflexes 2+ and symmetric.    Abdomen: soft, nontender, nondistended, no abnormal masses, no epigastric pain and FHT present   Membranes intact   [place]:  Frequency: Every 15 - 20 minutes   FHT: Baseline: 135 bpm, Variability: Moderate (6 - 25 bpm) and Accelerations: Present   Presentation: cephalic   Cervix: Examined by Rosa Cortes RN and Kushal Garner    Dilation: Closed   Effacement: Thick   Station:  -2   Consistency: firm   Position: middle-posterior     Laboratory Studies:   Results for orders placed or performed during the hospital encounter of 17   Rupture of Membrane Test or Screen   Result Value Ref Range    Rupture Fetal Membrane Negative Negative        ASSESSMENT AND PLAN: Stephanie Garcia is a  41 y.o. female who presented to [facility] [place] at 32w5d on 2017 for rule out of ROM. ROM+ testing was negative, cervix was found to be closed and thick. Fetal heart tracing was Category I and reassuring and she has not had any more leakage here. Quick US showed baby was vertex. Danger signs discussed with pt and she is agreeable with plan to discharge and follow up with Dr. Ward as needed.   1. IUP at 32w5d  2. Not in labor.  3. Activity as tolerated.   4. Stay well hydrated.   5. Follow up with PCP as outpatient.     Patient dicussed with attending physician, Dr.Kathryn Reyes, who agrees with the plan.      Kushal Garner PGY1 2017  [facility]

## 2021-07-08 ENCOUNTER — OFFICE VISIT (OUTPATIENT)
Dept: FAMILY MEDICINE | Facility: CLINIC | Age: 46
End: 2021-07-08
Payer: COMMERCIAL

## 2021-07-08 VITALS
HEART RATE: 75 BPM | SYSTOLIC BLOOD PRESSURE: 150 MMHG | RESPIRATION RATE: 12 BRPM | WEIGHT: 133.6 LBS | DIASTOLIC BLOOD PRESSURE: 90 MMHG | OXYGEN SATURATION: 100 % | TEMPERATURE: 98.4 F | HEIGHT: 60 IN | BODY MASS INDEX: 26.23 KG/M2

## 2021-07-08 DIAGNOSIS — K21.9 GASTROESOPHAGEAL REFLUX DISEASE WITHOUT ESOPHAGITIS: ICD-10-CM

## 2021-07-08 DIAGNOSIS — G56.01 CARPAL TUNNEL SYNDROME OF RIGHT WRIST: ICD-10-CM

## 2021-07-08 DIAGNOSIS — R43.8 BITTER TASTE: Primary | ICD-10-CM

## 2021-07-08 DIAGNOSIS — I10 WHITE COAT SYNDROME WITH DIAGNOSIS OF HYPERTENSION: ICD-10-CM

## 2021-07-08 LAB
% IMMATURE GRANULOCYTES: 0 %
ABS IMMATURE GRANULOCYTES: 0 THOU/UL
ALBUMIN SERPL BCP-MCNC: 3.6 G/DL (ref 3.5–5)
ALP SERPL-CCNC: 51 U/L (ref 45–120)
ALT SERPL W/O P-5'-P-CCNC: 11 U/L (ref 0–45)
ANION GAP SERPL CALCULATED.3IONS-SCNC: 11 MMOL/L (ref 5–18)
AST SERPL-CCNC: 17 U/L (ref 0–40)
BASOPHILS # BLD AUTO: 0.1 THOU/UL (ref 0–0.2)
BASOPHILS NFR BLD AUTO: 1 % (ref 0–2)
BILIRUB SERPL-MCNC: 0.4 MG/DL (ref 0–1)
BUN SERPL-MCNC: 15 MG/DL (ref 8–22)
CALCIUM SERPL-MCNC: 9.4 MG/DL (ref 8.5–10.5)
CHLORIDE SERPL-SCNC: 101 MMOL/L (ref 98–107)
CO2 SERPL-SCNC: 27 MMOL/L (ref 22–31)
CREAT SERPL-MCNC: 0.67 MG/DL (ref 0.6–1.1)
EOSINOPHIL # BLD AUTO: 0.1 THOU/UL (ref 0–0.4)
EOSINOPHIL NFR BLD AUTO: 2 % (ref 0–6)
ERYTHROCYTE [DISTWIDTH] IN BLOOD BY AUTOMATED COUNT: 13.6 % (ref 11–14.5)
GLUCOSE SERPL-MCNC: 92 MG/DL (ref 70–125)
HCT VFR BLD AUTO: 37.2 % (ref 35–47)
HGB BLD-MCNC: 11.7 G/DL (ref 12–16)
LYMPHOCYTES # BLD AUTO: 2.1 THOU/UL (ref 0.8–4.4)
LYMPHOCYTES NFR BLD AUTO: 37 % (ref 20–40)
MCH RBC QN AUTO: 27.5 PG (ref 27–34)
MCHC RBC AUTO-ENTMCNC: 31.5 G/DL (ref 32–36)
MCV RBC AUTO: 88 FL (ref 80–100)
MONOCYTES # BLD AUTO: 0.5 THOU/UL (ref 0–0.9)
MONOCYTES NFR BLD AUTO: 8 % (ref 2–10)
NEUTROPHILS # BLD AUTO: 3 THOU/UL (ref 2–7.7)
NEUTROPHILS NFR BLD AUTO: 52 % (ref 50–70)
PLATELET # BLD AUTO: 225 THOU/UL (ref 140–440)
PMV BLD AUTO: 12.2 FL (ref 8.5–12.5)
POTASSIUM SERPL-SCNC: 4 MMOL/L (ref 3.5–5)
PROT SERPL-MCNC: 6.9 G/DL (ref 6–8)
RBC # BLD AUTO: 4.25 MILL/UL (ref 3.8–5.4)
SODIUM SERPL-SCNC: 139 MMOL/L (ref 136–145)
WBC # BLD AUTO: 5.8 THOU/UL (ref 4–11)

## 2021-07-08 PROCEDURE — 36415 COLL VENOUS BLD VENIPUNCTURE: CPT | Performed by: STUDENT IN AN ORGANIZED HEALTH CARE EDUCATION/TRAINING PROGRAM

## 2021-07-08 PROCEDURE — 99214 OFFICE O/P EST MOD 30 MIN: CPT | Mod: GC | Performed by: STUDENT IN AN ORGANIZED HEALTH CARE EDUCATION/TRAINING PROGRAM

## 2021-07-08 RX ORDER — FAMOTIDINE 20 MG/1
20 TABLET, FILM COATED ORAL DAILY
Qty: 60 TABLET | Refills: 1 | Status: CANCELLED | OUTPATIENT
Start: 2021-07-08

## 2021-07-08 ASSESSMENT — ENCOUNTER SYMPTOMS
NUMBNESS: 1
PALPITATIONS: 0
CHEST TIGHTNESS: 0
AGITATION: 0
UNEXPECTED WEIGHT CHANGE: 0
DECREASED CONCENTRATION: 0
PARESTHESIAS: 1
FEVER: 0
DYSPHORIC MOOD: 0
SLEEP DISTURBANCE: 0
FREQUENCY: 0
HEADACHES: 0
CHILLS: 0
DIZZINESS: 0
DYSURIA: 0
SHORTNESS OF BREATH: 0

## 2021-07-08 ASSESSMENT — MIFFLIN-ST. JEOR: SCORE: 1182.51

## 2021-07-08 NOTE — PROGRESS NOTES
Assessment & Plan     Stephanie Garcia is a generally healthy 45-year-old woman who I am meeting today for the first time as her new PCP for evaluation of a number of complaints.  She has a history of essential hypertension for which she takes hydrochlorothiazide daily, which is her only daily medication.    Bitter taste  Gastroesophageal reflux disease without esophagitis  Presents with 6 months of bitter taste all of her mouth especially in the mornings.  Makes water taste bad.  Vaguely endorses sour taste, but unsure.  Associated with thick foamy saliva in mouth as well.  No recent exposure to chemicals or recent travel outside [facility].  Does not use herbal supplements.  Given patient mentions rare occasions of brief chest discomfort, most likely etiology is acid reflux.  However, given nontypical presentation will rule out lead poisoning, anemia, or electrolyte abnormalities.  Will start patient on PPI today for 2 weeks which I anticipate will resolve symptoms.   -     Lead, Blood ([facility])  -     CBC w/ Diff and Plt ([facility])  -     Comprehensive Metabolic ([facility]) - Results > 1 hr  -     omeprazole 20 mg twice daily for 2 weeks.  If symptoms resolve, will switch patient to famotidine and confirm diagnosis of GERD.    Carpal tunnel syndrome of right wrist  Over 8 months of numbness and tingling in right hand mostly in first 3 fingers.  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  Negative Tinel and Phalen tests on exam today.  However, history is very consistent with carpal tunnel.  -Wrist brace over-the-counter to wear at night and as tolerated during the day  -Reassess at follow-up visit in 2 weeks  -Educational handout reviewed and provided    White coat syndrome with diagnosis of hypertension  Elevated blood pressure 150/90 today.  Reports usually 120s/80s at home.    -  Provided reassurance and handout which we  reviewed together.  -Bring blood pressure cuff to next visit      See Patient Instructions    Return in about 2 weeks (around 7/22/2021) for acid reflux.    Neli Duncan MD  [facility] ANGELO Bronw is a 45 year old who presents for the following health issues:  Chief Complaint   Patient presents with     Other     Sour and bitter taste in mouth     Tingling     Right hand tangling happen mostly during the night time     Very bitter taste in mouth started around January. Usually in mornings when wakes up. Taste is so bitter can't even drink water.  Sees foam/ bubbly saliva in mouth in mornings. Happens most days.    Tingling in R hand started last year around November.  Discussed with former PCP dr. Solares at the time, it was not a priority then.  Now more frequent in the past month. Usually first 3 fingers.  Worse in the morning after waking up.  Associated with numbness sensation in those fingertips as well sometimes.    Herbal remedies? No.  Lead paint? 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 None in the past year. Last time left country was 2018 to [facility]    Blood pressure is elevated today at the office.  Patient reports she checks it at home regularly and usually is in the 120s/80s.  Patient reports blood pressures are usually high during office visits.     Review of Systems   Constitutional: Negative for chills, fever and unexpected weight change.   Respiratory: Negative for cough, chest tightness and shortness of breath.    Cardiovascular: Negative for chest pain and palpitations.        Gets CP few times/month last few seconds   Gastrointestinal: Negative for heartburn, nausea and vomiting.        Bitter taste, thick salia in mornings   Genitourinary: Negative.  Negative for dysuria and frequency.   Neurological: Positive for numbness and paresthesias. Negative for dizziness and headaches.   Psychiatric/Behavioral: Negative for agitation, decreased concentration,  dysphoric mood, mood changes and sleep disturbance.          Objective    BP (!) 152/87 (BP Location: Left arm, Patient Position: Sitting, Cuff Size: Adult Regular)   Pulse 75   Temp 98.4  F (36.9  C) (Oral)   Resp 12   Ht 1.524 m (5')   Wt 60.6 kg (133 lb 9.6 oz)   LMP 06/25/2021 (Within Days)   SpO2 100%   BMI 26.09 kg/m    Body mass index is 26.09 kg/m .  Physical Exam  Vitals signs reviewed.   Constitutional:       Appearance: Normal appearance.   Cardiovascular:      Rate and Rhythm: Normal rate and regular rhythm.      Pulses: Normal pulses.      Heart sounds: Normal heart sounds.   Pulmonary:      Effort: Pulmonary effort is normal.      Breath sounds: Normal breath sounds.   Abdominal:      General: Abdomen is flat. Bowel sounds are normal.      Palpations: Abdomen is soft.   Musculoskeletal: Normal range of motion.      Right wrist: Normal. She exhibits normal range of motion and no tenderness.      Right lower leg: No edema.      Left lower leg: No edema.      Comments: Negative tinnel and phalen tests   Skin:     General: Skin is warm and dry.      Capillary Refill: Capillary refill takes less than 2 seconds.   Neurological:      General: No focal deficit present.      Mental Status: She is alert and oriented to person, place, and time.      Gait: Gait normal.   Psychiatric:         Mood and Affect: Mood normal.

## 2021-07-08 NOTE — PROGRESS NOTES
Preceptor Attestation:    I discussed the patient with the resident and evaluated the patient in person. I have verified the content of the note, which accurately reflects my assessment of the patient and the plan of care.   Supervising Physician:  Jr Rogers MD.

## 2021-07-08 NOTE — PATIENT INSTRUCTIONS
1) Purchase Wrist Splint for Carpal Tunnel, Rrigh hand      White coat hypertension  My blood pressure is always higher in the doctor's office than it is at home. Why is this?  Answer From Thomas Hughes M.D.  You could have a condition known as white coat hypertension. White coat hypertension occurs when the blood pressure readings at your doctor's office are higher than they are in other settings, such as your home. It's called white coat hypertension because the health care professionals who measure your blood pressure sometimes wear white coats.    It was once thought that white coat hypertension was caused by the stress that doctor's appointments can create. Once you'd left the doctor's office, if your blood pressure normalized, the thought was that there wasn't a problem.    However, some doctors think that white coat hypertension might signal that you're at risk of developing high blood pressure as a long-term condition. If you experience white coat hypertension, you may also have a higher risk of developing certain cardiovascular problems compared with people who have normal blood pressure at all times. The same may be true for people who have masked hypertension, meaning their blood pressure is normal at the doctor's office, but spikes periodically when measured in other settings. It's thought that even these temporary increases in your blood pressure could develop into a long-term problem.    If you have white coat hypertension, talk to your doctor about home monitoring of your condition. Your doctor may ask you to wear a blood pressure monitor (ambulatory blood pressure monitor) for up to 24 hours to track your blood pressure during the daytime as well as while you sleep. This can help determine if your high blood pressure only occurs in the doctor's office or if it's a persistent condition that needs treatment.  Patient Education     Carpal Tunnel Syndrome    Carpal tunnel syndrome is a painful  condition of the wrist and arm. It is caused by pressure on the median nerve. The median nerve is one of the nerves that give feeling and movement to the hand. It passes through a tunnel in the wrist called the carpal tunnel. This tunnel is made up of bones and ligaments. Narrowing of this tunnel or swelling of the tissues inside the tunnel puts pressure on the median nerve. This causes numbness, pins and needles, or electric shooting pains in your hand and forearm. Often the pain is worse at night and may wake you when you are asleep.  Carpal tunnel syndrome may occur during pregnancy and with use of birth control pills. It is more common in workers who must often bend their wrists. It is also common in people who work with power tools that cause strong vibrations.  Home care    Rest the painful wrist. Avoid repeated bending of the wrist back and forth. This puts pressure on the median nerve. Avoid using power tools with strong vibrations.    If you were given a splint, wear it at night while you sleep. You may also wear it during the day for comfort.    Move your fingers and wrists often to prevent stiffness.    Elevate your arms on pillows when you lie down.    Try using the unaffected hand more.    Try not to hold your wrists in a bent, downward position.    Sometimes changes in the work place may ease symptoms. If you type most of the day, it may help to change the position of your keyboard or add a wrist support. Your wrist should be in a neutral position and not bent back when typing.    You may use over-the-counter pain medicine to treat pain and inflammation, unless another medicine was prescribed. Anti-inflammatory pain medicines, such as ibuprofen or naproxen may be more effective than acetaminophen, which treats pain, but not inflammation. If you have chronic liver or kidney disease or ever had a stomach ulcer or gastrointestinal bleeding, talk with your healthcare provider before using these  medicines.    Opioid pain medicine will only give temporary relief and does not treat the problem. If pain continues, you may need a shot of a steroid drug into your wrist.    If the above methods fail, you may need surgery. This will open the carpal tunnel and release the pressure on the trapped nerve.  Follow-up care  Follow up with your healthcare provider, or as advised. If X-rays were taken, you will be notified of any new findings that may affect your care.  When to seek medical advice  Call your healthcare provider right away if any of these occur:    Pain not improving with the above treatment    Fingers or hand become cold, blue, numb, or tingly    Your whole arm becomes swollen or weak  Rosemarie last reviewed this educational content on 5/1/2018 2000-2021 [employer]. All rights reserved. This information is not intended as a substitute for professional medical care. Always follow your healthcare professional's instructions.

## 2021-07-09 ASSESSMENT — ENCOUNTER SYMPTOMS
HEARTBURN: 0
VOMITING: 0
NAUSEA: 0
COUGH: 0

## 2021-07-11 LAB
COLLECTION METHOD: NORMAL
LEAD BLD-MCNC: NORMAL UG/DL
LEAD BLDV-MCNC: <2 UG/DL

## 2021-07-12 ENCOUNTER — TELEPHONE (OUTPATIENT)
Dept: FAMILY MEDICINE | Facility: CLINIC | Age: 46
End: 2021-07-12

## 2021-07-12 NOTE — TELEPHONE ENCOUNTER
[facility] phone call message- general phone call:    Reason for call: she was seen on the 8th and was prescribed some medication but she would like to talk to a nurse re the medication she has some question.    Action desired: call back.    Return call needed: Yes    OK to leave a message on voice mail? Yes    Advised patient to response may take up to 2 business days: Yes    Primary language: English      needed? No    Call taken on July 12, 2021 at 8:25 AM by Eliza Bustamante

## 2021-07-12 NOTE — TELEPHONE ENCOUNTER
Patient picked up Omeprazole at pharmacy and received a booklet that discusses side effects of Omeprazole, including kidney problems and bone fractures. She notes that many of these side effects are more likely when people are on the medication for a very long time. Reassured her that Omeprazole is a very safe medication, although there are some rare side effects which is why she was given this information. Discussed that because she is healthy and only taking a two week course, the provider feels that the benefits of the medication far outweighs the risks. She verbalized understanding and feels comfortable taking the medication. ./LR

## 2021-07-16 DIAGNOSIS — K21.9 GASTROESOPHAGEAL REFLUX DISEASE WITHOUT ESOPHAGITIS: ICD-10-CM

## 2021-07-16 DIAGNOSIS — R43.8 BITTER TASTE: ICD-10-CM

## 2021-07-19 NOTE — TELEPHONE ENCOUNTER
I gave pt PPI on 7/8/21 BID for likely GERD.   Pharmacy requesting 90 day supply of omeprazole.  Medication was prescribed for short term only - to ascertain diagnosis.  Will decrease from BID to once daily now. Then taper off and use H2 blocker.  Pt likely needs H pylori testing.    Stephanie was seen today for medication refill.    Diagnoses and all orders for this visit:    Gastroesophageal reflux disease without esophagitis  Bitter taste  -     omeprazole (PRILOSEC) 20 MG DR capsule; Take 1 capsule (20 mg) by mouth daily - Rx for  20 tablets, 0 refills    ----  Neli Duncan MD  PGY-3  Family Medicine Resident

## 2021-07-27 ENCOUNTER — MEDICAL CORRESPONDENCE (OUTPATIENT)
Dept: HEALTH INFORMATION MANAGEMENT | Facility: CLINIC | Age: 46
End: 2021-07-27

## 2021-07-27 ENCOUNTER — OFFICE VISIT (OUTPATIENT)
Dept: FAMILY MEDICINE | Facility: CLINIC | Age: 46
End: 2021-07-27
Payer: COMMERCIAL

## 2021-07-27 VITALS
WEIGHT: 132.4 LBS | HEART RATE: 79 BPM | OXYGEN SATURATION: 99 % | BODY MASS INDEX: 25.86 KG/M2 | RESPIRATION RATE: 16 BRPM | DIASTOLIC BLOOD PRESSURE: 88 MMHG | SYSTOLIC BLOOD PRESSURE: 130 MMHG | TEMPERATURE: 98.3 F

## 2021-07-27 DIAGNOSIS — R43.8 BITTER TASTE: Primary | ICD-10-CM

## 2021-07-27 PROCEDURE — 99213 OFFICE O/P EST LOW 20 MIN: CPT | Mod: GC | Performed by: STUDENT IN AN ORGANIZED HEALTH CARE EDUCATION/TRAINING PROGRAM

## 2021-07-27 NOTE — PROGRESS NOTES
Preceptor Attestation:    I discussed the patient with the resident and evaluated the patient in person. I have verified the content of the note, which accurately reflects my assessment of the patient and the plan of care.   Supervising Physician:  Ramo Birch MD.

## 2021-07-27 NOTE — PROGRESS NOTES
[facility] - Office Visit    ASSESSMENT AND PLAN     This is an otherwise healthy 45-year-old woman who presents for follow-up of bitter taste in her mouth in the mornings.  Symptoms have persisted for the past few weeks.  At prior visit on 7/8/2021, patient was trialed on PPI (omeprazole 20 mg twice daily), which she has been taking regularly as prescribed.  Unfortunately, symptoms have not changed at all.  Patient endorses occasional sour taste as well in the mornings.  Tastes do dissipate with rinsing the mouth.  Reviewed results from prior visits which demonstrate normal lead level, complete blood count, and comprehensive metabolic panel.  Patient has no other risk factors for other heavy metal poisoning's.  No personal or family history of acid reflux or HEENT disorders.  Will refer today for specialist evaluation.  Symptoms possibly due to sinus abnormalities.  Physical exam reassuring today.    Bitter taste  -     Otolaryngology Referral  -   Discontinue omeprazole    Follow-up: Return in about 4 weeks (around 8/24/2021) for Follow up.    The patient was seen by, and discussed with, Dr. Ramo Birch who agrees with the plan.  -----  Neli Duncan MD  PGY-3  [facility]         SUBJECTIVE       Stephanie Garcia is a 45 year old  female with a who presents to clinic today for:   Chief Complaint   Patient presents with     Gastrophageal Reflux     Pt is here to follow up on her GERD.  Pt states it is staying the same.  She states she sees no improvement with medication.        Doesn't want to discuss HTN today.  Wants to focus on better taste symptoms.  Patient reports that she wakes up with a bitter taste in her mouth which has not improved with omeprazole for the past few weeks.  She also has somewhat of a sour taste in her mouth at times as well as white lumps in her mouth, like dried saliva.  Symptoms resolve when she rinses off with water.  Occurs  infrequently during the daytime, mostly in the morning after waking up.  Today, we reviewed test results from last visit which were largely unremarkable.    Review of Systems   HENT: Negative for rhinorrhea and sore throat.         Bitter taste, occasionally sour taste as above   Gastrointestinal: Negative for abdominal pain and nausea.   All other systems reviewed and are negative.         OBJECTIVE   /88 (BP Location: Left arm, Patient Position: Sitting, Cuff Size: Adult Regular)   Pulse 79   Temp 98.3  F (36.8  C) (Oral)   Resp 16   Wt 60.1 kg (132 lb 6.4 oz)   LMP 07/21/2021 (Exact Date)   SpO2 99%   Breastfeeding No   BMI 25.86 kg/m     Body mass index is 25.86 kg/m .    Physical Exam  Vitals reviewed.   Constitutional:       Appearance: Normal appearance.   HENT:      Mouth/Throat:      Lips: Pink. No lesions.      Mouth: Mucous membranes are moist.      Dentition: Normal dentition.      Tongue: No lesions.      Palate: No lesions.      Pharynx: Oropharynx is clear. No oropharyngeal exudate.      Tonsils: No tonsillar exudate.   Cardiovascular:      Rate and Rhythm: Normal rate and regular rhythm.      Pulses: Normal pulses.   Abdominal:      General: Abdomen is flat.      Palpations: Abdomen is soft.   Neurological:      Mental Status: She is alert.       Office Visit on 07/08/2021   Component Date Value Ref Range Status     Collection Method 07/08/2021 Venous   Final     WBC 07/08/2021 5.8  4.0 - 11.0 thou/uL Final     RBC 07/08/2021 4.25  3.80 - 5.40 mill/uL Final     Hemoglobin 07/08/2021 11.7* 12.0 - 16.0 g/dL Final     Hematocrit 07/08/2021 37.2  35.0 - 47.0 % Final     MCV 07/08/2021 88  80 - 100 fL Final     MCH 07/08/2021 27.5  27.0 - 34.0 pg Final     MCHC 07/08/2021 31.5* 32.0 - 36.0 g/dL Final     RDW 07/08/2021 13.6  11.0 - 14.5 % Final     Platelets 07/08/2021 225  140 - 440 thou/uL Final     Mean Platelet Volume 07/08/2021 12.2  8.5 - 12.5 fL Final     % Neutrophils 07/08/2021 52   50 - 70 % Final     % Lymphocytes 07/08/2021 37  20 - 40 % Final     % Monocytes 07/08/2021 8  2 - 10 % Final     % Eosinophils 07/08/2021 2  0 - 6 % Final     % Basophils 07/08/2021 1  0 - 2 % Final     % Immature Granulocytes 07/08/2021 0  <=0 % Final     Neutrophils (Absolute) 07/08/2021 3.0  2.0 - 7.7 thou/uL Final     Lymphs (Absolute) 07/08/2021 2.1  0.8 - 4.4 thou/uL Final     Monocytes(Absolute) 07/08/2021 0.5  0.0 - 0.9 thou/uL Final     Eos (Absolute) 07/08/2021 0.1  0.0 - 0.4 thou/uL Final     Baso (Absolute) 07/08/2021 0.1  0.0 - 0.2 thou/uL Final     Abs Immature Granulocytes 07/08/2021 0.0  <=0.0 thou/uL Final     Sodium 07/08/2021 139  136 - 145 mmol/L Final     Potassium 07/08/2021 4.0  3.5 - 5.0 mmol/L Final     Chloride 07/08/2021 101  98 - 107 mmol/L Final     CO2, Total 07/08/2021 27  22 - 31 mmol/L Final     Anion Gap 07/08/2021 11  5 - 18 mmol/L Final     Glucose 07/08/2021 92  70 - 125 mg/dL Final     Urea Nitrogen 07/08/2021 15  8 - 22 mg/dL Final     Creatinine 07/08/2021 0.67  0.60 - 1.10 mg/dL Final     GFR Estimate If Black 07/08/2021 >60  >60 mL/min/1.73m2 Final     GFR Estimate 07/08/2021 >60  >60 mL/min/1.73m2 Final     Bilirubin Total 07/08/2021 0.4  0.0 - 1.0 mg/dL Final     Calcium 07/08/2021 9.4  8.5 - 10.5 mg/dL Final     Protein Total 07/08/2021 6.9  6.0 - 8.0 g/dL Final     Albumin 07/08/2021 3.6  3.5 - 5.0 g/dL Final     Alkaline Phosphatase 07/08/2021 51  45 - 120 U/L Final     AST (SGOT) 07/08/2021 17  0 - 40 U/L Final     ALT (SGPT) 07/08/2021 11  0 - 45 U/L Final     Lead, Blood (Venous) 07/08/2021 <2.0  <=4.9 ug/dL Final

## 2021-07-27 NOTE — PATIENT INSTRUCTIONS
1) call our clinic if no one calls you within 1 week for ENT referral  2) Come back in 4 weeks (after you have the ENT) doctor  3) come back sooner if new symptoms or it worsens    Neli Duncan MD     07/28/21   OTOLARYNGOLOGY REFERRAL     [facility]  Phone: [phone]  Fax: [phone]      Faxed Referral, demographics. They will contact patient to schedule.     Guerita Workman

## 2021-08-01 ASSESSMENT — ENCOUNTER SYMPTOMS
NAUSEA: 0
RHINORRHEA: 0
ABDOMINAL PAIN: 0
SORE THROAT: 0

## 2021-08-13 ENCOUNTER — TELEPHONE (OUTPATIENT)
Dept: FAMILY MEDICINE | Facility: CLINIC | Age: 46
End: 2021-08-13

## 2021-08-13 ENCOUNTER — OFFICE VISIT (OUTPATIENT)
Dept: FAMILY MEDICINE | Facility: CLINIC | Age: 46
End: 2021-08-13
Payer: COMMERCIAL

## 2021-08-13 VITALS
RESPIRATION RATE: 18 BRPM | HEART RATE: 79 BPM | TEMPERATURE: 99.3 F | DIASTOLIC BLOOD PRESSURE: 84 MMHG | SYSTOLIC BLOOD PRESSURE: 135 MMHG | OXYGEN SATURATION: 100 %

## 2021-08-13 DIAGNOSIS — R21 RASH: ICD-10-CM

## 2021-08-13 DIAGNOSIS — B86 SCABIES: Primary | ICD-10-CM

## 2021-08-13 PROCEDURE — 99213 OFFICE O/P EST LOW 20 MIN: CPT | Mod: GC | Performed by: STUDENT IN AN ORGANIZED HEALTH CARE EDUCATION/TRAINING PROGRAM

## 2021-08-13 RX ORDER — TRIAMCINOLONE ACETONIDE 1 MG/ML
LOTION TOPICAL 3 TIMES DAILY
Qty: 60 ML | Refills: 1 | Status: SHIPPED | OUTPATIENT
Start: 2021-08-13 | End: 2021-11-19

## 2021-08-13 RX ORDER — DIPHENHYDRAMINE HCL 25 MG
25 TABLET ORAL EVERY 6 HOURS PRN
COMMUNITY
Start: 2021-08-13 | End: 2021-11-19

## 2021-08-13 NOTE — TELEPHONE ENCOUNTER
[facility] Answering Service Pager Note    I received an answering service page at 5:39PM regarding medication question .    I called the patient and we spoke on the phone. Clarified how to use the permethrin cream.    Patient stated understanding.    Guerita Geronimo MD PGY3  [facility]  Pager: [phone]

## 2021-08-13 NOTE — PROGRESS NOTES
Preceptor attestation:  Vital signs reviewed: /84 (BP Location: Left arm, Patient Position: Sitting, Cuff Size: Adult Regular)   Pulse 79   Temp 99.3  F (37.4  C) (Tympanic)   Resp 18   LMP 07/21/2021 (Exact Date)   SpO2 100%     Patient seen, evaluated, and discussed with the resident.  I have verified the content of the note, which accurately reflects my assessment of the patient and the plan of care.    Supervising physician: Katarina Anguiano MD  [facility]

## 2021-08-13 NOTE — PATIENT INSTRUCTIONS
Patient Education     Scabies   Scabies is a skin infection. It's caused by a tiny parasitic mite that's too small to see directly. It can be seen under a microscope, but it's usually recognized only by the rash and symptoms it causes. This can make it hard to diagnose since the signs and symptoms can be similar to other diseases.  The scabies mite tunnels under the skin. It creates a small burrow, where it leaves its eggs. These eggs padron and grow into adults. They then create new burrows over the next 1 to 2 weeks. The mites die in about 4 to 6 weeks. The rash and itching are caused by an allergic reaction to the scabies saliva or feces.  Scabies is highly contagious. It's spread by direct skin contact. It's easily spread by close personal contact, sexual contact, or by sharing bed linens or clothing used by an infected person.  It may take 4 to 6 weeks for symptoms to appear after being exposed. Everyone living in the house with you, as well as your sexual partners, should be treated at the same time. After the first treatment, you will no longer be contagious. You may return to work, school or .  Home care    Machine wash in hot water all sheets, towels, pillowcases, underwear, pajamas, and any other clothing you have worn lately. Use the hot cycle of a dryer. If an item can't be laundered, dry clean the item.    Seal anything that is hard to wash in a plastic trash bag for at least 3 days, and possibly up to a week. This includes coats, jackets, blankets, and bedspreads. (The insects die after 3 days off the human body.)  Medicines  Scabicides  Medicines used to treat scabies are called scabicides. These are creams that kill the scabies mites. A prescription is needed. When using these medicines:    Always follow instructions provided by your healthcare provider and pharmacist. Also follow the printed instructions that come with the medicine.    Talk with your provider about precautions to take when  using these medicines.    Use the cream on your body when your skin is cool and dry. Don t use it after a hot shower or bath.    Follow your healthcare provider's instructions for applying the medicine. Usually the cream is put on your whole body. This means from your chin all the way down to your toes. Scabies doesn't usually affect an adult s head. So cream is not needed there. For children, discuss how to apply the medicine with your child s provider.    Leave the cream or lotion on for the recommended amount of time. This is usually 8 to 12 hours.    Don t leave cream or lotion on your skin longer than directed. Don t use more than recommended.    Wear clean clothes after the treatment.    If you wash your hands after using the cream, reapply the cream to your hands.    If you are breastfeeding, wash off your nipples before feeding. Then reapply the cream after breastfeeding.    For babies or infants, put mittens on their hands. This will stop them from licking the cream or lotion. It will also stop them from scratching themselves because of the itching.  Other medicines    An oral medicine called ivermectin may be prescribed for severe cases that don't respond to topical creams. It may also be used if you can t apply creams.    Itching may cause the most discomfort. If large areas of your skin are affected, over-the-counter antihistamines may be used to reduce itching. Or you may be given a prescription antihistamine. Some of these medicines may make you sleepy. They are best used at bedtime. Antihistamines that don t make you sleepy can be used during the day. Note: Don t use medicine that has diphenhydramine if you have glaucoma, or if you are a man who has trouble urinating due to an enlarged prostate.    If you were given antibiotics due to a bacterial infection, take them until they are finished. It's important to finish the antibiotics even if the wound looks better. This is to make sure the infection has  cleared.    Follow-up care  Follow up with your healthcare provider, or as advised. Call your provider if your symptoms don t improve after 1 week, or if new burrows or rashes appear.  When to seek medical advice  Call your healthcare provider right away if any of these occur:    Yellow-brown crusts or drainage from the sores    Other signs of infection, including increasing redness, swelling, pain, or pus    Fever of 100.4 F (38 C) or higher, or as directed by your provider  Rosemarie last reviewed this educational content on 8/1/2019 2000-2021 [employer]. All rights reserved. This information is not intended as a substitute for professional medical care. Always follow your healthcare professional's instructions.

## 2021-08-13 NOTE — PROGRESS NOTES
Assessment & Plan     Rash  Concern for scabies  Rash locations (axilla, lower abdomen, breast, groin) and pattern suspicious for scabies, especially since another member of the household also has a similar rash. Reviewed how to use the permethrin and how to clean household fabrics and clothes to remove. Provided steroid and antihistamine for current lesions and itching. Monitor, if improvement, likely scabies. If no improvement in a week, return for further evaluation.   - permethrin (ELIMITE) 1 % external lotion; Apply topically once for 1 dose  - triamcinolone (KENALOG) 0.1 % external lotion; Apply topically 3 times daily  - diphenhydrAMINE (BENADRYL) 25 MG tablet; Take 1 tablet (25 mg) by mouth every 6 hours as needed for itching or allergies      PERMETHRIN USE    Use the cream on your body when your skin is cool and dry. Don t use it after a hot shower or bath.    Follow your healthcare provider's instructions for applying the medicine. Usually the cream is put on your whole body. This means from your chin all the way down to your toes. Scabies doesn't usually affect an adult s head. So cream is not needed there. For children, discuss how to apply the medicine with your child s provider.    Leave the cream or lotion on for the recommended amount of time. This is usually 8 to 12 hours.    Don t leave cream or lotion on your skin longer than directed. Don t use more than recommended.    Wear clean clothes after the treatment.    Return if symptoms worsen or fail to improve.    Patient was seen by and discussed with attending physician, Dr. Anguiano.       Martha Cabrera MD  [facility] ANGELO Brown is a 45 year old who presents for the following health issues     HPI     Rash  Onset/Duration: Last weekend  Description  Location: Axilla, breast, lower abdomen, and groin.  Character: raised, red  Itching: severe  Progression of Symptoms:  waxing and waning  Accompanying  signs and symptoms:   Fever: no  Body aches or joint pain: no  Sore throat symptoms: no  Recent cold symptoms: no  History:           Previous episodes of similar rash: None  New exposures:  None  Recent travel: no  Exposure to similar rash: no, but daughter who lives in the same household has developed the same kind of rash  Precipitating or alleviating factors: Lesions seem to become less itchy over the course of a couple of days, but then new ones pop up.   Therapies tried and outcome: Oil relieves some of the itchiness      Review of Systems   See HPI above      Objective    /84 (BP Location: Left arm, Patient Position: Sitting, Cuff Size: Adult Regular)   Pulse 79   Temp 99.3  F (37.4  C) (Tympanic)   Resp 18   LMP 07/21/2021 (Exact Date)   SpO2 100%   There is no height or weight on file to calculate BMI.  Physical Exam   GENERAL: healthy, alert and no distress  RESP:No increased work of breathing  MS: no gross musculoskeletal defects noted, no edema  SKIN: Erythematous excoriated papules - abdomen, breast and groin. Notably, no lesions on hands or between fingers.       ----- Service Performed and Documented by Resident or Fellow ------

## 2021-08-13 NOTE — LETTER
[facility] [address] [place] [address]  Phone: [phone]  Fax: [phone]    August 13, 2021        Stephanie Garcia  [address] [place] [address]          To whom it may concern:    RE: Stephanie Garcia    Patient was seen today at our clinic. She and her daughter should not go back to work until 8/16/21.     Please contact me for questions or concerns.      Sincerely,        Martha Cabrera MD

## 2021-08-20 ENCOUNTER — OFFICE VISIT (OUTPATIENT)
Dept: OTOLARYNGOLOGY | Facility: CLINIC | Age: 46
End: 2021-08-20
Attending: FAMILY MEDICINE
Payer: COMMERCIAL

## 2021-08-20 DIAGNOSIS — R43.8 BITTER TASTE: ICD-10-CM

## 2021-08-20 DIAGNOSIS — K21.9 LPRD (LARYNGOPHARYNGEAL REFLUX DISEASE): Primary | ICD-10-CM

## 2021-08-20 PROCEDURE — 99203 OFFICE O/P NEW LOW 30 MIN: CPT | Performed by: OTOLARYNGOLOGY

## 2021-08-20 NOTE — PROGRESS NOTES
CHIEF COMPLAINT:   Diagnoses       Codes Comments    Bitter taste     R43.8              HISTORY OF PRESENT ILLNESS    Stephanie was seen at the behest of [facility] for bitter taste in mouth.     RSI = 7           REVIEW OF SYSTEMS    Review of Systems as per HPI and PMHx, otherwise 10 system review system are negative.     Nkda [no known drug allergies]     LMP 07/21/2021 (Exact Date)     HEAD: Normal appearance and symmetry:  No cutaneous lesions.      NECK:  supple     EARS: normal TM, EACs     NOSE:     Dorsum:   straight  Septum:  midline  Mucosa:  moist  Inferior turbinates:  boggy, purple        ORAL CAVITY/OROPHARYNX:     Lips:  Normal.  Tongue: normal, midline  Mucosa:   no lesions  Tonsils:  1-2+     NECK:  Trachea:  midline.              Thyroid:  normal              Adenopathy:  none        NEURO:   Alert and Oriented     GAIT AND STATION:  normal     RESPIRATORY:   Symmetry and Respiratory effort     PSYCH:  Normal mood and affect     SKIN:   warm and dry         FLEX LARYNGOSCOPY:    After obtaining consent, a flexible laryngoscope is used to examine both nasal cavities, the nasopharynx, pharnx, and larynx.      Nasal cavity:wnl  NP: wnl  Pharnx: wnl  Larynx:  PC thickeing    IMPRESSION:    Encounter Diagnosis   Name Primary?     Bitter taste           RECOMMENDATIONS:    Avoid eating 2-3 hours before bedtime.   You may find it helpful to elevate the head of your bed.     Avoid following foods that are likely to trigger acid reflux:    Coffee or tea (try LOW ACID coffee or herbal tea)  Anything that s fizzy or has caffeine in it  Alcohol   Citrus fruits, such as oranges and barbara  Tomato based foods (salsa, pizza, lasanga)  Chocolate   Mint or peppermint  Fatty foods (ice cream)  Spicy foods  Onions and garlic    Orders Placed This Encounter   Procedures     XR Esophagram

## 2021-08-20 NOTE — PATIENT INSTRUCTIONS
Acid Suppression Treatment    Start omeprazole 20mg tab, take 2 tabs 30 minutes before breakfast   You may be referred to Dr. Luis Marin, a reflux specialist.       Lifestyle changes:    Avoid eating 2-3 hours before bedtime.   You may find it helpful to elevate the head of your bed.     Avoid following foods that are likely to trigger acid reflux:    Coffee or tea (try LOW ACID coffee or herbal tea)  Anything that s fizzy or has caffeine in it  Alcohol   Citrus fruits, such as oranges and barbara  Tomato based foods (salsa, pizza, lasanga)  Chocolate   Mint or peppermint  Fatty foods (ice cream)  Spicy foods  Onions and garlic

## 2021-08-20 NOTE — LETTER
8/20/2021         RE: Stephanie Garcia  [address]  [place] [address]        Dear Colleague,    Thank you for referring your patient, Stephanie Garcia, to the [facility]. Please see a copy of my visit note below.    CHIEF COMPLAINT:   Diagnoses       Codes Comments    Bitter taste     R43.8              HISTORY OF PRESENT ILLNESS    Stephanie was seen at the behest of [facility] for bitter taste in mouth.     RSI = 7           REVIEW OF SYSTEMS    Review of Systems as per HPI and PMHx, otherwise 10 system review system are negative.     Nkda [no known drug allergies]     LMP 07/21/2021 (Exact Date)     HEAD: Normal appearance and symmetry:  No cutaneous lesions.      NECK:  supple     EARS: normal TM, EACs     NOSE:     Dorsum:   straight  Septum:  midline  Mucosa:  moist  Inferior turbinates:  boggy, purple        ORAL CAVITY/OROPHARYNX:     Lips:  Normal.  Tongue: normal, midline  Mucosa:   no lesions  Tonsils:  1-2+     NECK:  Trachea:  midline.              Thyroid:  normal              Adenopathy:  none        NEURO:   Alert and Oriented     GAIT AND STATION:  normal     RESPIRATORY:   Symmetry and Respiratory effort     PSYCH:  Normal mood and affect     SKIN:   warm and dry         FLEX LARYNGOSCOPY:    After obtaining consent, a flexible laryngoscope is used to examine both nasal cavities, the nasopharynx, pharnx, and larynx.      Nasal cavity:wnl  NP: wnl  Pharnx: wnl  Larynx:  PC thickeing    IMPRESSION:    Encounter Diagnosis   Name Primary?     Bitter taste           RECOMMENDATIONS:    Avoid eating 2-3 hours before bedtime.   You may find it helpful to elevate the head of your bed.     Avoid following foods that are likely to trigger acid reflux:    Coffee or tea (try LOW ACID coffee or herbal tea)  Anything that s fizzy or has caffeine in it  Alcohol   Citrus fruits, such as oranges and barbara  Tomato based foods (salsa, pizza, lasanga)  Chocolate   Mint or  peppermint  Fatty foods (ice cream)  Spicy foods  Onions and garlic    Orders Placed This Encounter   Procedures     XR Esophagram           Again, thank you for allowing me to participate in the care of your patient.        Sincerely,        Giovani Manjarrez MD

## 2021-09-07 ENCOUNTER — ALLIED HEALTH/NURSE VISIT (OUTPATIENT)
Dept: FAMILY MEDICINE | Facility: CLINIC | Age: 46
End: 2021-09-07
Payer: COMMERCIAL

## 2021-09-07 DIAGNOSIS — Z23 NEED FOR PROPHYLACTIC VACCINATION AND INOCULATION AGAINST INFLUENZA: Primary | ICD-10-CM

## 2021-09-07 PROCEDURE — 90471 IMMUNIZATION ADMIN: CPT

## 2021-09-07 PROCEDURE — 90686 IIV4 VACC NO PRSV 0.5 ML IM: CPT

## 2021-10-01 ENCOUNTER — HOSPITAL ENCOUNTER (OUTPATIENT)
Dept: RADIOLOGY | Facility: HOSPITAL | Age: 46
Discharge: HOME | End: 2021-10-01
Attending: OTOLARYNGOLOGY | Admitting: OTOLARYNGOLOGY
Payer: COMMERCIAL

## 2021-10-01 PROCEDURE — 74220 X-RAY XM ESOPHAGUS 1CNTRST: CPT

## 2021-10-01 PROCEDURE — 255N000001 HC RX 255: Performed by: OTOLARYNGOLOGY

## 2021-10-01 RX ADMIN — ANTACID/ANTIFLATULENT 4 G: 380; 550; 10; 10 GRANULE, EFFERVESCENT ORAL at 13:33

## 2021-10-20 ENCOUNTER — OFFICE VISIT (OUTPATIENT)
Dept: OTOLARYNGOLOGY | Facility: CLINIC | Age: 46
End: 2021-10-20
Payer: COMMERCIAL

## 2021-10-20 DIAGNOSIS — R43.8 OTHER DISTURBANCES OF SMELL AND TASTE: Primary | ICD-10-CM

## 2021-10-20 DIAGNOSIS — K21.9 LPRD (LARYNGOPHARYNGEAL REFLUX DISEASE): ICD-10-CM

## 2021-10-20 PROCEDURE — 99214 OFFICE O/P EST MOD 30 MIN: CPT | Performed by: OTOLARYNGOLOGY

## 2021-10-20 RX ORDER — FAMOTIDINE 40 MG/1
40 TABLET, FILM COATED ORAL AT BEDTIME
Qty: 90 TABLET | Refills: 0 | Status: SHIPPED | OUTPATIENT
Start: 2021-10-20 | End: 2022-01-19

## 2021-10-20 NOTE — PROGRESS NOTES
CHIEF COMPLAINT:  Recheck      HISTORY OF PRESENT ILLNESS    Stephanie was seen in follow up for taste disturbance. Normal esophagram.  Was placed on omeprazole 40 mg in AM at last visit.  RSI = 7 at previous vist.    No longer spitting up white phlegmn (soft, sticky white pieces).   Some sore taste at night.  No daytime symptoms.  Some chest discomfort.   She was able to modify her diet (decrease onion, garlic, tomato).  No hoarsenss, or dysphagia.  Some increased life stress.      Bitter taste              RECOMMENDATIONS:     Avoid eating 2-3 hours before bedtime.   You may find it helpful to elevate the head of your bed.      Avoid following foods that are likely to trigger acid reflux:     Coffee or tea (try LOW ACID coffee or herbal tea)  Anything that s fizzy or has caffeine in it  Alcohol   Citrus fruits, such as oranges and barbara  Tomato based foods (salsa, pizza, lasanga)  Chocolate   Mint or peppermint  Fatty foods (ice cream)  Spicy foods  Onions and garlic         Orders Placed This Encounter   Procedures     XR Esophagram            REVIEW OF SYSTEMS    Review of Systems: a 10-system review is reviewed at this encounter.  See scanned document.     Nkda [no known drug allergies]     PHYSICAL EXAM:        HEAD: Normal appearance and symmetry:  No cutaneous lesions.      EARS:   Auricles normal      NOSE:    Dorsum:   straight       ORAL CAVITY/OROPHARYNX:    Lips:  Normal.     NECK:  Trachea:  midline      NEURO:   Alert and Oriented    GAIT AND STATION:  normal     RESPIRATORY:   Symmetry and Respiratory effort    PSYCH:   normal mood and affect    SKIN:  warm and dry         IMPRESSION:   Encounter Diagnoses   Name Primary?     Other disturbances of smell and taste Yes     LPRD (laryngopharyngeal reflux disease)      Slowly has had some improvement with the PPI medication.  She wishes does not wish to continue it at this time.  Due to the slight chest discomfort and sour taste at night I like to restart  Pepcid at bedtime.  Also would like her evaluated by Luis Marin,  who can do pH monitoring and also examine the esophagus for signs of GERD.  All questions were answered.  She is agreeable to plan of care     RECOMMENDATIONS:    Finished PPI mediation, then start pepcid at bedtime.   Referral to Luis Marin DO for further evaluation and management.

## 2021-10-20 NOTE — LETTER
10/20/2021         RE: Stephanie Garcia  [address]  [place] [address]        Dear Colleague,    Thank you for referring your patient, Stephanie Garcia, to the [facility]. Please see a copy of my visit note below.    CHIEF COMPLAINT:  Recheck      HISTORY OF PRESENT ILLNESS    Stephanie was seen in follow up for taste disturbance. Normal esophagram.  Was placed on omeprazole 40 mg in AM at last visit.  RSI = 7 at previous vist.    No longer spitting up white phlegmn (soft, sticky white pieces).   Some sore taste at night.  No daytime symptoms.  Some chest discomfort.   She was able to modify her diet (decrease onion, garlic, tomato).  No hoarsenss, or dysphagia.  Some increased life stress.      Bitter taste              RECOMMENDATIONS:     Avoid eating 2-3 hours before bedtime.   You may find it helpful to elevate the head of your bed.      Avoid following foods that are likely to trigger acid reflux:     Coffee or tea (try LOW ACID coffee or herbal tea)  Anything that s fizzy or has caffeine in it  Alcohol   Citrus fruits, such as oranges and barbara  Tomato based foods (salsa, pizza, lasanga)  Chocolate   Mint or peppermint  Fatty foods (ice cream)  Spicy foods  Onions and garlic         Orders Placed This Encounter   Procedures     XR Esophagram            REVIEW OF SYSTEMS    Review of Systems: a 10-system review is reviewed at this encounter.  See scanned document.     Nkda [no known drug allergies]     PHYSICAL EXAM:        HEAD: Normal appearance and symmetry:  No cutaneous lesions.      EARS:   Auricles normal      NOSE:    Dorsum:   straight       ORAL CAVITY/OROPHARYNX:    Lips:  Normal.     NECK:  Trachea:  midline      NEURO:   Alert and Oriented    GAIT AND STATION:  normal     RESPIRATORY:   Symmetry and Respiratory effort    PSYCH:   normal mood and affect    SKIN:  warm and dry         IMPRESSION:   Encounter Diagnoses   Name Primary?     Other disturbances of  smell and taste Yes     LPRD (laryngopharyngeal reflux disease)      Slowly has had some improvement with the PPI medication.  She wishes does not wish to continue it at this time.  Due to the slight chest discomfort and sour taste at night I like to restart Pepcid at bedtime.  Also would like her evaluated by Luis Marin,  who can do pH monitoring and also examine the esophagus for signs of GERD.  All questions were answered.  She is agreeable to plan of care     RECOMMENDATIONS:    Finished PPI mediation, then start pepcid at bedtime.   Referral to Luis Marin DO for further evaluation and management.       Again, thank you for allowing me to participate in the care of your patient.        Sincerely,        Giovani Manjarrez MD

## 2021-11-14 ENCOUNTER — HEALTH MAINTENANCE LETTER (OUTPATIENT)
Age: 46
End: 2021-11-14

## 2021-11-18 DIAGNOSIS — I10 ESSENTIAL HYPERTENSION: ICD-10-CM

## 2021-11-18 RX ORDER — HYDROCHLOROTHIAZIDE 12.5 MG/1
12.5 TABLET ORAL DAILY
Qty: 90 TABLET | Refills: 3 | Status: CANCELLED | OUTPATIENT
Start: 2021-11-18

## 2021-11-19 ENCOUNTER — OFFICE VISIT (OUTPATIENT)
Dept: FAMILY MEDICINE | Facility: CLINIC | Age: 46
End: 2021-11-19
Payer: COMMERCIAL

## 2021-11-19 VITALS
RESPIRATION RATE: 20 BRPM | SYSTOLIC BLOOD PRESSURE: 136 MMHG | TEMPERATURE: 98.7 F | WEIGHT: 129 LBS | DIASTOLIC BLOOD PRESSURE: 85 MMHG | OXYGEN SATURATION: 100 % | BODY MASS INDEX: 24.35 KG/M2 | HEIGHT: 61 IN | HEART RATE: 88 BPM

## 2021-11-19 DIAGNOSIS — I10 WHITE COAT SYNDROME WITH DIAGNOSIS OF HYPERTENSION: ICD-10-CM

## 2021-11-19 DIAGNOSIS — K21.9 GASTROESOPHAGEAL REFLUX DISEASE WITHOUT ESOPHAGITIS: Primary | ICD-10-CM

## 2021-11-19 DIAGNOSIS — I10 ESSENTIAL HYPERTENSION: ICD-10-CM

## 2021-11-19 PROCEDURE — 99214 OFFICE O/P EST MOD 30 MIN: CPT | Mod: GC | Performed by: STUDENT IN AN ORGANIZED HEALTH CARE EDUCATION/TRAINING PROGRAM

## 2021-11-19 RX ORDER — HYDROCHLOROTHIAZIDE 12.5 MG/1
12.5 TABLET ORAL DAILY
Qty: 90 TABLET | Refills: 3 | Status: SHIPPED | OUTPATIENT
Start: 2021-11-19 | End: 2022-11-06

## 2021-11-19 ASSESSMENT — ENCOUNTER SYMPTOMS
CHILLS: 0
FEVER: 0
CHEST TIGHTNESS: 0
ABDOMINAL PAIN: 0
SHORTNESS OF BREATH: 0

## 2021-11-19 ASSESSMENT — MIFFLIN-ST. JEOR: SCORE: 1167.75

## 2021-11-19 NOTE — PATIENT INSTRUCTIONS
Start taking your famotidine 30 minutes before dinner    Drop off stool sample next week - looking for bacteria (H. Pylori), after your appt with surgeon Dr. Luis Marin    Keep checking blood pressure 3x a week    Neli Duncan MD

## 2021-11-22 ENCOUNTER — OFFICE VISIT (OUTPATIENT)
Dept: SURGERY | Facility: CLINIC | Age: 46
End: 2021-11-22
Attending: OTOLARYNGOLOGY
Payer: COMMERCIAL

## 2021-11-22 VITALS — HEIGHT: 60 IN | WEIGHT: 129.3 LBS | BODY MASS INDEX: 25.39 KG/M2

## 2021-11-22 DIAGNOSIS — K21.9 LPRD (LARYNGOPHARYNGEAL REFLUX DISEASE): ICD-10-CM

## 2021-11-22 PROCEDURE — 87338 HPYLORI STOOL AG IA: CPT | Performed by: STUDENT IN AN ORGANIZED HEALTH CARE EDUCATION/TRAINING PROGRAM

## 2021-11-22 PROCEDURE — 99204 OFFICE O/P NEW MOD 45 MIN: CPT | Performed by: SURGERY

## 2021-11-22 ASSESSMENT — MIFFLIN-ST. JEOR: SCORE: 1158

## 2021-11-22 NOTE — PROGRESS NOTES
HPI: Stephanie Garcia is a 46 year old female referred to see me by Neli Duncan for evaluation of gastroesophageal reflux disease.  She notes  a several month history of gastroesophageal reflux disease.  She describes waterbrash-like symptoms in the morning with a sour foul taste as well as an inability to lay down flat at night secondary to intermittent regurgitation.  She has had intermittent esophageal burning but states this is fairly rare.  Denies any food association.  She has seen ENT and an esophagram was obtained.  This was relatively unremarkable.    Allergies:Nkda [no known drug allergies]    Past Medical History:   Diagnosis Date     Essential hypertension 12/18/2019       Past Surgical History:   Procedure Laterality Date     NO HISTORY OF SURGERY         CURRENT MEDS:    Current Outpatient Medications:      acetaminophen (TYLENOL) 500 MG tablet, Take 1-2 tablets (500-1,000 mg) by mouth every 6 hours as needed for mild pain, Disp: 100 tablet, Rfl: 0     famotidine (PEPCID) 40 MG tablet, Take 1 tablet (40 mg) by mouth At Bedtime, Disp: 90 tablet, Rfl: 0     hydrochlorothiazide (HYDRODIURIL) 12.5 MG tablet, Take 1 tablet (12.5 mg) by mouth daily, Disp: 90 tablet, Rfl: 3    Family History   Problem Relation Age of Onset     Hypertension Mother      Hypertension Sister      Hypertension Father      No Known Problems Maternal Grandmother      No Known Problems Maternal Grandfather      No Known Problems Paternal Grandmother      No Known Problems Paternal Grandfather      No Known Problems Brother      No Known Problems Son      No Known Problems Daughter      No Known Problems Maternal Half-Brother      No Known Problems Maternal Half-Sister      No Known Problems Paternal Half-Brother      No Known Problems Paternal Half-Sister      No Known Problems Niece      No Known Problems Nephew      No Known Problems Cousin      No Known Problems Other      Diabetes No family hx of      Coronary Artery Disease  No family hx of      Breast Cancer No family hx of      Colon Cancer No family hx of      Prostate Cancer No family hx of      Other Cancer No family hx of      Cancer No family hx of      Heart Disease No family hx of      Hyperlipidemia No family hx of      Kidney Disease No family hx of      Cerebrovascular Disease No family hx of      Obesity No family hx of      Thrombosis No family hx of      Asthma No family hx of      Arthritis No family hx of      Thyroid Disease No family hx of      Depression No family hx of      Mental Illness No family hx of      Substance Abuse No family hx of      Cystic Fibrosis No family hx of      Early Death No family hx of      Coronary Artery Disease Early Onset No family hx of      Heart Failure No family hx of      Bleeding Diathesis No family hx of      Dementia No family hx of      Ovarian Cancer No family hx of      Uterine Cancer No family hx of      Colorectal Cancer No family hx of      Pancreatic Cancer No family hx of      Lung Cancer No family hx of      Melanoma No family hx of      Autoimmune Disease No family hx of      Unknown/Adopted No family hx of      Genetic Disorder No family hx of      Hypertension Sister         reports that she has never smoked. She has never used smokeless tobacco. She reports that she does not drink alcohol and does not use drugs.    Review of Systems:  The 12 system review is within normal limits except for as mentioned above.  General ROS: No complaints or constitutional symptoms  Ophthalmic ROS: No complaints of visual changes  Skin: No complaints or symptoms   Endocrine: No complaints or symptoms  Hematologic/Lymphatic: No symptoms or complaints  Psychiatric: No symptoms or complaints  Respiratory ROS: no cough, shortness of breath, or wheezing  Cardiovascular ROS: no chest pain or dyspnea on exertion  Gastrointestinal ROS: As per HPI  Genito-Urinary ROS: no dysuria, trouble voiding, or hematuria  Musculoskeletal ROS: no joint or  muscle pain  Neurological ROS: no TIA or stroke symptoms      EXAM:  Ht 1.524 m (5')   Wt 58.7 kg (129 lb 4.8 oz)   BMI 25.25 kg/m    GENERAL: Well developed female, No acute distress, pleasant and conversant   EYES: Pupils equal, round and reactive, no scleral icterus  ABDOMEN: Soft, nontender, nondistended  SKIN: Pink, warm and dry, no obvious rashes or lesions   NEURO:No focal deficits, ambulatory  MUSCULOSKELETAL:No obvious deformities, no swelling, normal appearing      LABS:  Lab Results   Component Value Date    HGB 11.7 07/08/2021    HCT 37.2 07/08/2021    MCV 88 07/08/2021     INR/Prothrombin Time  @LABRCNTIP(NA,K,CL,co2,bun,creatinine,labglom,glucose,calcium)@  No results found for: ALT, AST, GGT, ALKPHOS, BILITOT    IMAGES:   Relevant images were reviewed and discussed with the patient.  Notable findings were: Esophagram images reviewed and demonstrate relatively normal esophagus and GE junction with no evidence of a hiatal hernia or reflux    Assessment/Plan:   Stephanie Garcia is a 46 year old female with signs and symptoms consistent with likely silent reflux not captured on the esophagram.  I have explained the pathophysiology of reflux in detail as well as the surgical versus non-operative management strategies.      At this point we will continue with a work-up including an EGD.  The risks and benefits of the procedure were explained in detail and she has consented to proceed.  We will check for H. pylori as well during the biopsies.      Luis Marin, DO FACS  [phone]  [facility] Department of Surgery

## 2021-11-22 NOTE — LETTER
11/22/2021         RE: Stephanie Garcia  [address]  [place] [address]        Dear Colleague,    Thank you for referring your patient, Stephanie Garcia, to the [facility] [place]. Please see a copy of my visit note below.    HPI: Stephanie Garcia is a 46 year old female referred to see me by Neli Duncan for evaluation of gastroesophageal reflux disease.  She notes  a several month history of gastroesophageal reflux disease.  She describes waterbrash-like symptoms in the morning with a sour foul taste as well as an inability to lay down flat at night secondary to intermittent regurgitation.  She has had intermittent esophageal burning but states this is fairly rare.  Denies any food association.  She has seen ENT and an esophagram was obtained.  This was relatively unremarkable.    Allergies:Nkda [no known drug allergies]    Past Medical History:   Diagnosis Date     Essential hypertension 12/18/2019       Past Surgical History:   Procedure Laterality Date     NO HISTORY OF SURGERY         CURRENT MEDS:    Current Outpatient Medications:      acetaminophen (TYLENOL) 500 MG tablet, Take 1-2 tablets (500-1,000 mg) by mouth every 6 hours as needed for mild pain, Disp: 100 tablet, Rfl: 0     famotidine (PEPCID) 40 MG tablet, Take 1 tablet (40 mg) by mouth At Bedtime, Disp: 90 tablet, Rfl: 0     hydrochlorothiazide (HYDRODIURIL) 12.5 MG tablet, Take 1 tablet (12.5 mg) by mouth daily, Disp: 90 tablet, Rfl: 3    Family History   Problem Relation Age of Onset     Hypertension Mother      Hypertension Sister      Hypertension Father      No Known Problems Maternal Grandmother      No Known Problems Maternal Grandfather      No Known Problems Paternal Grandmother      No Known Problems Paternal Grandfather      No Known Problems Brother      No Known Problems Son      No Known Problems Daughter      No Known Problems Maternal Half-Brother      No Known  Problems Maternal Half-Sister      No Known Problems Paternal Half-Brother      No Known Problems Paternal Half-Sister      No Known Problems Niece      No Known Problems Nephew      No Known Problems Cousin      No Known Problems Other      Diabetes No family hx of      Coronary Artery Disease No family hx of      Breast Cancer No family hx of      Colon Cancer No family hx of      Prostate Cancer No family hx of      Other Cancer No family hx of      Cancer No family hx of      Heart Disease No family hx of      Hyperlipidemia No family hx of      Kidney Disease No family hx of      Cerebrovascular Disease No family hx of      Obesity No family hx of      Thrombosis No family hx of      Asthma No family hx of      Arthritis No family hx of      Thyroid Disease No family hx of      Depression No family hx of      Mental Illness No family hx of      Substance Abuse No family hx of      Cystic Fibrosis No family hx of      Early Death No family hx of      Coronary Artery Disease Early Onset No family hx of      Heart Failure No family hx of      Bleeding Diathesis No family hx of      Dementia No family hx of      Ovarian Cancer No family hx of      Uterine Cancer No family hx of      Colorectal Cancer No family hx of      Pancreatic Cancer No family hx of      Lung Cancer No family hx of      Melanoma No family hx of      Autoimmune Disease No family hx of      Unknown/Adopted No family hx of      Genetic Disorder No family hx of      Hypertension Sister         reports that she has never smoked. She has never used smokeless tobacco. She reports that she does not drink alcohol and does not use drugs.    Review of Systems:  The 12 system review is within normal limits except for as mentioned above.  General ROS: No complaints or constitutional symptoms  Ophthalmic ROS: No complaints of visual changes  Skin: No complaints or symptoms   Endocrine: No complaints or symptoms  Hematologic/Lymphatic: No symptoms or  complaints  Psychiatric: No symptoms or complaints  Respiratory ROS: no cough, shortness of breath, or wheezing  Cardiovascular ROS: no chest pain or dyspnea on exertion  Gastrointestinal ROS: As per HPI  Genito-Urinary ROS: no dysuria, trouble voiding, or hematuria  Musculoskeletal ROS: no joint or muscle pain  Neurological ROS: no TIA or stroke symptoms      EXAM:  Ht 1.524 m (5')   Wt 58.7 kg (129 lb 4.8 oz)   BMI 25.25 kg/m    GENERAL: Well developed female, No acute distress, pleasant and conversant   EYES: Pupils equal, round and reactive, no scleral icterus  ABDOMEN: Soft, nontender, nondistended  SKIN: Pink, warm and dry, no obvious rashes or lesions   NEURO:No focal deficits, ambulatory  MUSCULOSKELETAL:No obvious deformities, no swelling, normal appearing      LABS:  Lab Results   Component Value Date    HGB 11.7 07/08/2021    HCT 37.2 07/08/2021    MCV 88 07/08/2021     INR/Prothrombin Time  @LABRCNTIP(NA,K,CL,co2,bun,creatinine,labglom,glucose,calcium)@  No results found for: ALT, AST, GGT, ALKPHOS, BILITOT    IMAGES:   Relevant images were reviewed and discussed with the patient.  Notable findings were: Esophagram images reviewed and demonstrate relatively normal esophagus and GE junction with no evidence of a hiatal hernia or reflux    Assessment/Plan:   Stephanie Garcia is a 46 year old female with signs and symptoms consistent with likely silent reflux not captured on the esophagram.  I have explained the pathophysiology of reflux in detail as well as the surgical versus non-operative management strategies.      At this point we will continue with a work-up including an EGD.  The risks and benefits of the procedure were explained in detail and she has consented to proceed.  We will check for H. pylori as well during the biopsies.      Luis Marin, DO FACS  [phone]  [facility] Department of Surgery      Again, thank you for allowing me to participate in the care of your patient.         Sincerely,        Luis Marin, DO

## 2021-11-23 LAB — H PYLORI AG STL QL IA: NEGATIVE

## 2021-11-26 ENCOUNTER — IMMUNIZATION (OUTPATIENT)
Dept: FAMILY MEDICINE | Facility: CLINIC | Age: 46
End: 2021-11-26
Payer: COMMERCIAL

## 2021-11-26 PROCEDURE — 91300 PR COVID VAC PFIZER DIL RECON 30 MCG/0.3 ML IM: CPT

## 2021-11-26 PROCEDURE — 0004A PR COVID VAC PFIZER DIL RECON 30 MCG/0.3 ML IM: CPT

## 2021-11-30 ENCOUNTER — TELEPHONE (OUTPATIENT)
Dept: SURGERY | Facility: CLINIC | Age: 46
End: 2021-11-30
Payer: COMMERCIAL

## 2021-11-30 NOTE — TELEPHONE ENCOUNTER
Returned patient call with questions, she would like to do her covid test at her primary clinic instead the [facility].  She also inquired about moving her procedure date up to December, informed her that we would not be able to because he is fully booked.

## 2021-12-09 DIAGNOSIS — Z11.59 ENCOUNTER FOR SCREENING FOR OTHER VIRAL DISEASES: ICD-10-CM

## 2021-12-22 ENCOUNTER — TELEPHONE (OUTPATIENT)
Dept: FAMILY MEDICINE | Facility: CLINIC | Age: 46
End: 2021-12-22
Payer: COMMERCIAL

## 2021-12-22 DIAGNOSIS — Z11.52 ENCOUNTER FOR PREOPERATIVE SCREENING LABORATORY TESTING FOR COVID-19 VIRUS: Primary | ICD-10-CM

## 2021-12-22 DIAGNOSIS — Z01.812 ENCOUNTER FOR PREOPERATIVE SCREENING LABORATORY TESTING FOR COVID-19 VIRUS: Primary | ICD-10-CM

## 2021-12-29 ASSESSMENT — MIFFLIN-ST. JEOR: SCORE: 1156.64

## 2021-12-31 ENCOUNTER — LAB (OUTPATIENT)
Dept: LAB | Facility: CLINIC | Age: 46
End: 2021-12-31
Payer: COMMERCIAL

## 2021-12-31 DIAGNOSIS — Z01.812 ENCOUNTER FOR PREOPERATIVE SCREENING LABORATORY TESTING FOR COVID-19 VIRUS: ICD-10-CM

## 2021-12-31 DIAGNOSIS — Z11.52 ENCOUNTER FOR PREOPERATIVE SCREENING LABORATORY TESTING FOR COVID-19 VIRUS: ICD-10-CM

## 2021-12-31 PROCEDURE — U0003 INFECTIOUS AGENT DETECTION BY NUCLEIC ACID (DNA OR RNA); SEVERE ACUTE RESPIRATORY SYNDROME CORONAVIRUS 2 (SARS-COV-2) (CORONAVIRUS DISEASE [COVID-19]), AMPLIFIED PROBE TECHNIQUE, MAKING USE OF HIGH THROUGHPUT TECHNOLOGIES AS DESCRIBED BY CMS-2020-01-R: HCPCS

## 2021-12-31 PROCEDURE — U0005 INFEC AGEN DETEC AMPLI PROBE: HCPCS

## 2022-01-01 LAB — SARS-COV-2 RNA RESP QL NAA+PROBE: NEGATIVE

## 2022-01-03 ENCOUNTER — ANESTHESIA EVENT (OUTPATIENT)
Dept: SURGERY | Facility: AMBULATORY SURGERY CENTER | Age: 47
End: 2022-01-03
Payer: COMMERCIAL

## 2022-01-04 ENCOUNTER — HOSPITAL ENCOUNTER (OUTPATIENT)
Facility: AMBULATORY SURGERY CENTER | Age: 47
End: 2022-01-04
Attending: SURGERY
Payer: COMMERCIAL

## 2022-01-04 ENCOUNTER — ANESTHESIA (OUTPATIENT)
Dept: SURGERY | Facility: AMBULATORY SURGERY CENTER | Age: 47
End: 2022-01-04
Payer: COMMERCIAL

## 2022-01-04 VITALS
RESPIRATION RATE: 16 BRPM | DIASTOLIC BLOOD PRESSURE: 57 MMHG | OXYGEN SATURATION: 99 % | TEMPERATURE: 97.1 F | HEART RATE: 87 BPM | HEIGHT: 60 IN | WEIGHT: 129 LBS | SYSTOLIC BLOOD PRESSURE: 101 MMHG | BODY MASS INDEX: 25.32 KG/M2

## 2022-01-04 DIAGNOSIS — K21.9 LPRD (LARYNGOPHARYNGEAL REFLUX DISEASE): ICD-10-CM

## 2022-01-04 PROCEDURE — 43239 EGD BIOPSY SINGLE/MULTIPLE: CPT | Performed by: SURGERY

## 2022-01-04 RX ORDER — LIDOCAINE 40 MG/G
CREAM TOPICAL
Status: DISCONTINUED | OUTPATIENT
Start: 2022-01-04 | End: 2022-01-05 | Stop reason: HOSPADM

## 2022-01-04 RX ORDER — SODIUM CHLORIDE, SODIUM LACTATE, POTASSIUM CHLORIDE, CALCIUM CHLORIDE 600; 310; 30; 20 MG/100ML; MG/100ML; MG/100ML; MG/100ML
INJECTION, SOLUTION INTRAVENOUS CONTINUOUS
Status: DISCONTINUED | OUTPATIENT
Start: 2022-01-04 | End: 2022-01-05 | Stop reason: HOSPADM

## 2022-01-04 RX ORDER — SODIUM CHLORIDE, SODIUM LACTATE, POTASSIUM CHLORIDE, CALCIUM CHLORIDE 600; 310; 30; 20 MG/100ML; MG/100ML; MG/100ML; MG/100ML
INJECTION, SOLUTION INTRAVENOUS CONTINUOUS
Status: CANCELLED | OUTPATIENT
Start: 2022-01-04

## 2022-01-04 RX ORDER — GLYCOPYRROLATE 0.2 MG/ML
INJECTION, SOLUTION INTRAMUSCULAR; INTRAVENOUS PRN
Status: DISCONTINUED | OUTPATIENT
Start: 2022-01-04 | End: 2022-01-04

## 2022-01-04 RX ORDER — ONDANSETRON 4 MG/1
4 TABLET, ORALLY DISINTEGRATING ORAL EVERY 30 MIN PRN
Status: CANCELLED | OUTPATIENT
Start: 2022-01-04

## 2022-01-04 RX ORDER — PROPOFOL 10 MG/ML
INJECTION, EMULSION INTRAVENOUS PRN
Status: DISCONTINUED | OUTPATIENT
Start: 2022-01-04 | End: 2022-01-04

## 2022-01-04 RX ORDER — ONDANSETRON 2 MG/ML
4 INJECTION INTRAMUSCULAR; INTRAVENOUS EVERY 30 MIN PRN
Status: CANCELLED | OUTPATIENT
Start: 2022-01-04

## 2022-01-04 RX ORDER — LIDOCAINE HYDROCHLORIDE 20 MG/ML
INJECTION, SOLUTION INFILTRATION; PERINEURAL PRN
Status: DISCONTINUED | OUTPATIENT
Start: 2022-01-04 | End: 2022-01-04

## 2022-01-04 RX ORDER — PROPOFOL 10 MG/ML
INJECTION, EMULSION INTRAVENOUS CONTINUOUS PRN
Status: DISCONTINUED | OUTPATIENT
Start: 2022-01-04 | End: 2022-01-04

## 2022-01-04 RX ORDER — FENTANYL CITRATE 50 UG/ML
25 INJECTION, SOLUTION INTRAMUSCULAR; INTRAVENOUS
Status: CANCELLED | OUTPATIENT
Start: 2022-01-04

## 2022-01-04 RX ORDER — FENTANYL CITRATE 50 UG/ML
25 INJECTION, SOLUTION INTRAMUSCULAR; INTRAVENOUS EVERY 5 MIN PRN
Status: CANCELLED | OUTPATIENT
Start: 2022-01-04

## 2022-01-04 RX ADMIN — PROPOFOL 40 MG: 10 INJECTION, EMULSION INTRAVENOUS at 09:42

## 2022-01-04 RX ADMIN — GLYCOPYRROLATE 0.2 MG: 0.2 INJECTION, SOLUTION INTRAMUSCULAR; INTRAVENOUS at 09:35

## 2022-01-04 RX ADMIN — LIDOCAINE HYDROCHLORIDE 3 ML: 20 INJECTION, SOLUTION INFILTRATION; PERINEURAL at 09:35

## 2022-01-04 RX ADMIN — SODIUM CHLORIDE, SODIUM LACTATE, POTASSIUM CHLORIDE, CALCIUM CHLORIDE: 600; 310; 30; 20 INJECTION, SOLUTION INTRAVENOUS at 09:09

## 2022-01-04 RX ADMIN — PROPOFOL 200 MCG/KG/MIN: 10 INJECTION, EMULSION INTRAVENOUS at 09:35

## 2022-01-04 ASSESSMENT — MIFFLIN-ST. JEOR: SCORE: 1156.64

## 2022-01-04 NOTE — ANESTHESIA CARE TRANSFER NOTE
Patient: Stephanie Garcia    Procedure: Procedure(s):  ESOPHAGOGASTRODUODENOSCOPY, WITH BIOPSY       Diagnosis: LPRD (laryngopharyngeal reflux disease) [K21.9]  Diagnosis Additional Information: No value filed.    Anesthesia Type:   MAC     Note:    Oropharynx: oropharynx clear of all foreign objects  Level of Consciousness: awake and drowsy  Oxygen Supplementation: room air    Independent Airway: airway patency satisfactory and stable  Dentition: dentition unchanged      Patient transferred to: Phase II    Handoff Report: Identifed the Patient, Identified the Reponsible Provider, Reviewed the pertinent medical history, Discussed the surgical course, Reviewed Intra-OP anesthesia mangement and issues during anesthesia, Set expectations for post-procedure period and Allowed opportunity for questions and acknowledgement of understanding      Vitals:  Vitals Value Taken Time   /57 01/04/22 0950   Temp 97.1  F (36.2  C) 01/04/22 0950   Pulse 87 01/04/22 0950   Resp 16 01/04/22 0950   SpO2 99 % 01/04/22 0950       Electronically Signed By: ENRIQUE Pimentel CRNA  January 4, 2022  9:52 AM

## 2022-01-04 NOTE — OP NOTE
Name:  Stephanie Radha  PCP:  Neli Duncan  Procedure Date:  1/4/2022      Procedure(s):  ESOPHAGOGASTRODUODENOSCOPY, WITH BIOPSY    Pre-Procedure Diagnosis:  LPRD (laryngopharyngeal reflux disease) [K21.9]     Post-Procedure Diagnosis:    Gastroesophageal reflux disease    Surgeon(s):  Luis Marin DO    Circulator: Bethanie Berry RN  Scrub Person: BUCK HORNE    Anesthesia Type:  GET      Findings:  Small hiatal hernia  Hill grade 1 gastroesophageal junction            Operative Report:    The patient was taken the operating room placed in a left lateral decubitus position.  A bite block was placed.  The endoscope was advanced down into the esophagus into the second portion duodenum without difficulty.  The scope was withdrawn and 2 random biopsies were taken with cold forceps.  One of the antrum one of the greater curvature.  The scope was retroflexed and demonstrated a small Hill grade 1 gastroesophageal junction with a small hiatal hernia that was sliding.  Scope was withdrawn the GE junction appeared normal.  The Z-line measured at 35 cm.  The scope was then removed without difficulty and the patient tolerated the procedure well.  She was sent to the PACU to undergo recovery.    Estimated Blood Loss:   0cc    Specimens:    ID Type Source Tests Collected by Time Destination   1 :  Biopsy Stomach, Antrum SURGICAL PATHOLOGY EXAM Luis Marin DO 1/4/2022  9:40 AM    2 :  Biopsy Stomach, Greater Curvature SURGICAL PATHOLOGY EXAM Luis Marin DO 1/4/2022  9:41 AM           Drains:        Complications:    None    Luis Marin DO

## 2022-01-04 NOTE — PROGRESS NOTES
Offered patient pregnancy test.  Explained there are risks associated with anesthesia and pregnancy.  Patient verbalizes understanding, denies possibility of pregnancy and declines pregnancy test.  Becky Black RN on 1/4/2022 at 8:52 AM

## 2022-01-04 NOTE — ANESTHESIA PREPROCEDURE EVALUATION
Anesthesia Pre-Procedure Evaluation    Patient: Stephanie Garcia   MRN: [id number] : 1975        Preoperative Diagnosis: LPRD (laryngopharyngeal reflux disease) [K21.9]    Procedure : Procedure(s):  ESOPHAGOGASTRODUODENOSCOPY, WITH BIOPSY          Past Medical History:   Diagnosis Date     Essential hypertension 2019     Gastroesophageal reflux disease      Motion sickness      Other chronic pain       Past Surgical History:   Procedure Laterality Date     NO HISTORY OF SURGERY        Allergies   Allergen Reactions     Nkda [No Known Drug Allergies]       Social History     Tobacco Use     Smoking status: Never Smoker     Smokeless tobacco: Never Used     Tobacco comment:  smokes   Substance Use Topics     Alcohol use: No     Alcohol/week: 0.0 standard drinks      Wt Readings from Last 1 Encounters:   22 58.5 kg (129 lb)        Anesthesia Evaluation            ROS/MED HX  ENT/Pulmonary:  - neg pulmonary ROS     Neurologic: Comment: RUE paresthesias      Cardiovascular:     (+) hypertension-----    METS/Exercise Tolerance: >4 METS    Hematologic:  - neg hematologic  ROS     Musculoskeletal:       GI/Hepatic:     (+) GERD,     Renal/Genitourinary:  - neg Renal ROS     Endo:  - neg endo ROS     Psychiatric/Substance Use:  - neg psychiatric ROS     Infectious Disease:  - neg infectious disease ROS     Malignancy:       Other:      (+) , H/O Chronic Pain, Any chance pregnant: pt denies.       Physical Exam    Airway        Mallampati: III   TM distance: > 3 FB   Neck ROM: full     Respiratory Devices and Support         Dental       (+) chipped      Cardiovascular          Rhythm and rate: regular     Pulmonary   pulmonary exam normal                OUTSIDE LABS:  CBC:   Lab Results   Component Value Date    HGB 11.7 (L) 2021    HGB 12.3 2017    HCT 37.2 2021    HCT 44.9 10/26/2015     BMP:   Lab Results   Component Value Date     2021     2021     POTASSIUM 4.0 07/08/2021    POTASSIUM 4.0 02/02/2021    CHLORIDE 101 07/08/2021    CHLORIDE 102 02/02/2021    CO2 29.8 07/17/2020    CO2 27.7 12/18/2019    BUN 15 07/08/2021    BUN 15 02/02/2021    CR 0.67 07/08/2021    CR 0.69 02/02/2021    GLC 92 07/08/2021    GLC 95 02/02/2021     COAGS: No results found for: PTT, INR, FIBR  POC:   Lab Results   Component Value Date    HCG POSITIVE 01/19/2017     HEPATIC:   Lab Results   Component Value Date    ALBUMIN 3.6 07/08/2021    PROTTOTAL 6.9 07/08/2021    BILITOTAL 0.4 07/08/2021     OTHER:   Lab Results   Component Value Date    SMITH 9.4 07/08/2021       Anesthesia Plan    ASA Status:  2   NPO Status:  NPO Appropriate    Anesthesia Type: MAC.      Maintenance: TIVA.        Consents    Anesthesia Plan(s) and associated risks, benefits, and realistic alternatives discussed. Questions answered and patient/representative(s) expressed understanding.    - Discussed:     - Discussed with:  Patient      - Patient is DNR/DNI Status: No         Postoperative Care    Pain management: Multi-modal analgesia.   PONV prophylaxis: Ondansetron (or other 5HT-3), Dexamethasone or Solumedrol     Comments:                Tiffanie Tate MD

## 2022-01-04 NOTE — H&P
HPI  46 year old year old female who has a history of H. pylori who presents with reflux and plans for an EGD.      Allergies:Nkda [no known drug allergies]    Past Medical History:   Diagnosis Date     Essential hypertension 12/18/2019     Gastroesophageal reflux disease      Motion sickness      Other chronic pain        Past Surgical History:   Procedure Laterality Date     NO HISTORY OF SURGERY           CURRENT MEDS:    Current Outpatient Medications:      acetaminophen (TYLENOL) 500 MG tablet, Take 1-2 tablets (500-1,000 mg) by mouth every 6 hours as needed for mild pain, Disp: 100 tablet, Rfl: 0     famotidine (PEPCID) 40 MG tablet, Take 1 tablet (40 mg) by mouth At Bedtime, Disp: 90 tablet, Rfl: 0     hydrochlorothiazide (HYDRODIURIL) 12.5 MG tablet, Take 1 tablet (12.5 mg) by mouth daily, Disp: 90 tablet, Rfl: 3    Current Facility-Administered Medications:      lactated ringers infusion, , Intravenous, Continuous, Jaspreet Kate MD, Last Rate: 100 mL/hr at 01/04/22 0909, New Bag at 01/04/22 0909     lidocaine (LMX4) kit, , Topical, Q1H PRN, Jaspreet Kate MD     lidocaine 1 % 0.1-1 mL, 0.1-1 mL, Other, Q1H PRN, Jaspreet Kate MD     PRE OP antibiotics NOT needed for this surgical procedure., 1 each, As instructed, Continuous, Luis Marin, DO     sodium chloride (PF) 0.9% PF flush 3 mL, 3 mL, Intracatheter, Q8H, Jaspreet Kate MD     sodium chloride (PF) 0.9% PF flush 3 mL, 3 mL, Intracatheter, q1 min prn, Jaspreet Kate MD    FAMHX-reviewed     reports that she has never smoked. She has never used smokeless tobacco. She reports that she does not drink alcohol and does not use drugs.    Review of Systems:  The 12 point review of systems  is within normal limits except for as mentioned above in the HPI.  General ROS: No complaints or constitutional symptoms  Ophthalmic ROS: No complaints of visual changes  Skin: No complaints or symptoms   Endocrine: No complaints or  symptoms  Hematologic/Lymphatic: No symptoms or complaints  Psychiatric: No symptoms or complaints  Respiratory ROS: no cough, shortness of breath, or wheezing  Cardiovascular ROS: no chest pain or dyspnea on exertion  Gastrointestinal ROS: As per HPI  Genito-Urinary ROS: no dysuria, trouble voiding, or hematuria  Musculoskeletal ROS: no joint or muscle pain  Neurological ROS: no TIA or stroke symptoms      EXAM:  /73   Pulse 70   Temp 97.8  F (36.6  C) (Temporal)   Resp 16   Ht 1.524 m (5')   Wt 58.5 kg (129 lb)   SpO2 98%   BMI 25.19 kg/m    GENERAL: Well developed female, No acute distress, pleasant and conversant   EYES: Pupils equal, round and reactive, no scleral icterus  CARDIAC: Regular rate and rhythm, no obvious murmurs noted  CHEST/LUNG: Clear to ascultation bilaterally, No ronchi, No wheezes  ABDOMEN: Soft  SKIN: Pink, warm and dry, no obvious rashes or lesions   NEURO:No focal deficits, ambulatory  MUSCULOSKELETAL:No obvious deformities, no swelling, normal appearing      LABS:  Lab Results   Component Value Date    HGB 11.7 07/08/2021    HCT 37.2 07/08/2021    MCV 88 07/08/2021     INR/Prothrombin Time  No results for input(s): NA, CO2, BUN, CREATININE, GLUCOSE in the last 168 hours.    Invalid input(s): K, CL, LABGLOM, CALCIUM  No results found for: ALT, AST, GGT, ALKPHOS, BILITOT      Assessment/Plan:   Stephanie Garcia is a 46 year old female with gastroesophageal reflux disease and H. Pylori.  Plan for EGD with biopsies      Yosef Marin D.O. FACS  [phone]

## 2022-01-04 NOTE — DISCHARGE INSTRUCTIONS
If you have any questions or concerns regarding your procedure, please contact Dr. Marin, his office number is [phone].

## 2022-01-04 NOTE — ANESTHESIA POSTPROCEDURE EVALUATION
Patient: Stephanie Garcia    Procedure: Procedure(s):  ESOPHAGOGASTRODUODENOSCOPY, WITH BIOPSY       Diagnosis:LPRD (laryngopharyngeal reflux disease) [K21.9]  Diagnosis Additional Information: No value filed.    Anesthesia Type:  MAC    Note:     Postop Pain Control: Uneventful            Sign Out: Well controlled pain   PONV: No   Neuro/Psych: Uneventful            Sign Out: Acceptable/Baseline neuro status   Airway/Respiratory: Uneventful            Sign Out: Acceptable/Baseline resp. status   CV/Hemodynamics: Uneventful            Sign Out: Acceptable CV status; No obvious hypovolemia; No obvious fluid overload   Other NRE: NONE   DID A NON-ROUTINE EVENT OCCUR? No           Last vitals:  Vitals Value Taken Time   /57 01/04/22 0950   Temp 97.1  F (36.2  C) 01/04/22 0950   Pulse 87 01/04/22 0950   Resp 16 01/04/22 1030   SpO2 99 % 01/04/22 0950       Electronically Signed By: Tiffanie Tate MD  January 4, 2022  2:08 PM

## 2022-01-09 ENCOUNTER — HEALTH MAINTENANCE LETTER (OUTPATIENT)
Age: 47
End: 2022-01-09

## 2022-01-10 ENCOUNTER — TELEPHONE (OUTPATIENT)
Dept: SURGERY | Facility: CLINIC | Age: 47
End: 2022-01-10
Payer: COMMERCIAL

## 2022-01-10 NOTE — TELEPHONE ENCOUNTER
I called patient to discuss results of her EGD and biopsies.  At this point she will follow-up with me in 1 year for ongoing discussion regarding her reflux.  Dietary, lifestyle and medication management strategies were discussed.    Yosef Marin DO [facility]  [phone]

## 2022-01-19 ENCOUNTER — TELEPHONE (OUTPATIENT)
Dept: SURGERY | Facility: CLINIC | Age: 47
End: 2022-01-19
Payer: COMMERCIAL

## 2022-01-19 DIAGNOSIS — K21.9 LPRD (LARYNGOPHARYNGEAL REFLUX DISEASE): ICD-10-CM

## 2022-01-19 RX ORDER — FAMOTIDINE 40 MG/1
40 TABLET, FILM COATED ORAL AT BEDTIME
Qty: 90 TABLET | Refills: 3 | Status: SHIPPED | OUTPATIENT
Start: 2022-01-19 | End: 2022-04-15

## 2022-01-19 NOTE — TELEPHONE ENCOUNTER
Patient said she left a voice (not sure what number) and has not gotten a call back has questions regarding her medications.    Please call and advise.    Thanks!

## 2022-01-19 NOTE — TELEPHONE ENCOUNTER
Returned call to Stephanie. Discussed continuation of Pepcid 40mg. She would like this sent to her pharmacy. Pt has a follow up appointment in 1 year with Dr. Marin for further evaluation, sooner if she has any concerns.

## 2022-02-17 PROBLEM — O09.529 SUPERVISION OF HIGH-RISK PREGNANCY OF ELDERLY MULTIGRAVIDA: Status: RESOLVED | Noted: 2017-02-02 | Resolved: 2017-09-07

## 2022-04-15 ENCOUNTER — OFFICE VISIT (OUTPATIENT)
Dept: FAMILY MEDICINE | Facility: CLINIC | Age: 47
End: 2022-04-15
Payer: COMMERCIAL

## 2022-04-15 VITALS
BODY MASS INDEX: 24.39 KG/M2 | HEART RATE: 73 BPM | HEIGHT: 61 IN | DIASTOLIC BLOOD PRESSURE: 86 MMHG | TEMPERATURE: 98.2 F | RESPIRATION RATE: 20 BRPM | OXYGEN SATURATION: 100 % | SYSTOLIC BLOOD PRESSURE: 134 MMHG | WEIGHT: 129.2 LBS

## 2022-04-15 DIAGNOSIS — Z00.00 ROUTINE GENERAL MEDICAL EXAMINATION AT A HEALTH CARE FACILITY: Primary | ICD-10-CM

## 2022-04-15 DIAGNOSIS — I10 ESSENTIAL HYPERTENSION: ICD-10-CM

## 2022-04-15 DIAGNOSIS — K21.9 LPRD (LARYNGOPHARYNGEAL REFLUX DISEASE): ICD-10-CM

## 2022-04-15 DIAGNOSIS — Z13.220 SCREENING FOR LIPID DISORDERS: ICD-10-CM

## 2022-04-15 LAB
ANION GAP SERPL CALCULATED.3IONS-SCNC: 8 MMOL/L (ref 5–18)
BUN SERPL-MCNC: 17 MG/DL (ref 8–22)
CALCIUM SERPL-MCNC: 9.4 MG/DL (ref 8.5–10.5)
CHLORIDE BLD-SCNC: 102 MMOL/L (ref 98–107)
CHOLEST SERPL-MCNC: 173 MG/DL
CO2 SERPL-SCNC: 29 MMOL/L (ref 22–31)
CREAT SERPL-MCNC: 0.64 MG/DL (ref 0.6–1.1)
ERYTHROCYTE [DISTWIDTH] IN BLOOD BY AUTOMATED COUNT: 14.7 % (ref 10–15)
FASTING STATUS PATIENT QL REPORTED: ABNORMAL
GFR SERPL CREATININE-BSD FRML MDRD: >90 ML/MIN/1.73M2
GLUCOSE BLD-MCNC: 96 MG/DL (ref 70–125)
HBA1C MFR BLD: 5.4 % (ref 0–5.6)
HCT VFR BLD AUTO: 36.7 % (ref 35–47)
HDLC SERPL-MCNC: 41 MG/DL
HGB BLD-MCNC: 11.3 G/DL (ref 11.7–15.7)
LDLC SERPL CALC-MCNC: 103 MG/DL
MCH RBC QN AUTO: 25.3 PG (ref 26.5–33)
MCHC RBC AUTO-ENTMCNC: 30.8 G/DL (ref 31.5–36.5)
MCV RBC AUTO: 82 FL (ref 78–100)
PLATELET # BLD AUTO: 261 10E3/UL (ref 150–450)
POTASSIUM BLD-SCNC: 4 MMOL/L (ref 3.5–5)
RBC # BLD AUTO: 4.47 10E6/UL (ref 3.8–5.2)
SODIUM SERPL-SCNC: 139 MMOL/L (ref 136–145)
TRIGL SERPL-MCNC: 147 MG/DL
WBC # BLD AUTO: 5.3 10E3/UL (ref 4–11)

## 2022-04-15 PROCEDURE — 36415 COLL VENOUS BLD VENIPUNCTURE: CPT | Performed by: STUDENT IN AN ORGANIZED HEALTH CARE EDUCATION/TRAINING PROGRAM

## 2022-04-15 PROCEDURE — 80061 LIPID PANEL: CPT | Performed by: STUDENT IN AN ORGANIZED HEALTH CARE EDUCATION/TRAINING PROGRAM

## 2022-04-15 PROCEDURE — 80048 BASIC METABOLIC PNL TOTAL CA: CPT | Performed by: STUDENT IN AN ORGANIZED HEALTH CARE EDUCATION/TRAINING PROGRAM

## 2022-04-15 PROCEDURE — 99396 PREV VISIT EST AGE 40-64: CPT | Mod: GC | Performed by: STUDENT IN AN ORGANIZED HEALTH CARE EDUCATION/TRAINING PROGRAM

## 2022-04-15 PROCEDURE — 85027 COMPLETE CBC AUTOMATED: CPT | Performed by: STUDENT IN AN ORGANIZED HEALTH CARE EDUCATION/TRAINING PROGRAM

## 2022-04-15 PROCEDURE — 83036 HEMOGLOBIN GLYCOSYLATED A1C: CPT | Performed by: STUDENT IN AN ORGANIZED HEALTH CARE EDUCATION/TRAINING PROGRAM

## 2022-04-15 RX ORDER — FAMOTIDINE 20 MG/1
20 TABLET, FILM COATED ORAL
Qty: 60 TABLET | Refills: 3 | Status: SHIPPED | OUTPATIENT
Start: 2022-04-15 | End: 2022-04-19

## 2022-04-15 ASSESSMENT — ENCOUNTER SYMPTOMS
SEIZURES: 0
ARTHRALGIAS: 0
JOINT SWELLING: 0
PALPITATIONS: 0
ABDOMINAL PAIN: 0
RHINORRHEA: 0
DIFFICULTY URINATING: 0
DYSURIA: 0
VOMITING: 0
FEVER: 0
BACK PAIN: 0
UNEXPECTED WEIGHT CHANGE: 0
DYSPHORIC MOOD: 0
NAUSEA: 0
CHILLS: 0
FATIGUE: 0
COUGH: 0
DIARRHEA: 0
COLOR CHANGE: 0
HEMATURIA: 0
SORE THROAT: 0
SHORTNESS OF BREATH: 0
AGITATION: 0

## 2022-04-15 NOTE — PROGRESS NOTES
Preceptor Attestation:  I discussed the patient with the resident and evaluated the patient in person. I have verified the content of the note, which accurately reflects my assessment of the patient and the plan of care.  Supervising Physician:  Rachael Reyes MD.

## 2022-04-15 NOTE — PATIENT INSTRUCTIONS
Increase fruit intake.  Start weight lifting  Start calcium supplement  Come back in 1 yr for physical, pap, & mammogram      Preventive Health Recommendations  Female Ages 40 to 49    Yearly exam:   See your health care provider every year in order to  Review health changes.   Discuss preventive care.    Review your medicines if your doctor prescribed any.    Get a Pap test every three years (unless you have an abnormal result and your provider advises testing more often).    If you get Pap tests with HPV test, you only need to test every 5 years, unless you have an abnormal result. You do not need a Pap test if your uterus was removed (hysterectomy) and you have not had cancer.    You should be tested each year for STDs (sexually transmitted diseases), if you're at risk.   Ask your doctor if you should have a mammogram.    Have a colonoscopy (test for colon cancer) if someone in your family has had colon cancer or polyps before age 50.     Have a cholesterol test every 5 years.     Have a diabetes test (fasting glucose) after age 45. If you are at risk for diabetes, you should have this test every 3 years.    Shots: Get a flu shot each year. Get a tetanus shot every 10 years.     Nutrition:   Eat at least 5 servings of fruits and vegetables each day.  Eat whole-grain bread, whole-wheat pasta and brown rice instead of white grains and rice.  Get adequate Calcium and Vitamin D.      Lifestyle  Exercise at least 150 minutes a week (an average of 30 minutes a day, 5 days a week). This will help you control your weight and prevent disease.  Start weight lifting for bone health  Wear sunscreen to prevent skin cancer.  See your dentist every six months for an exam and cleaning.

## 2022-04-15 NOTE — TELEPHONE ENCOUNTER
I am sending you this refill because it came under your preceptor's name.  Please route it back to your primary PCS.  Kimberlyn Taylor, CMA

## 2022-04-15 NOTE — PROGRESS NOTES
[facility] - Office Visit - Physical Exam    ASSESSMENT & PLAN     Routine general medical examination at a health care facility  Screening for lipid disorders  Generally healthy 46-year-old woman here for physical exam.  She is eating some fruits and vegetables, but will work on increasing this.  Exercising 5 to 6 days a week.  Weight is within normal range.  Family history notable for diabetes in her grandmother diagnosed in her 70s otherwise unremarkable.  Today patient prefers to defer mammogram and Pap smear.  -     Basic metabolic panel; Future  -     Lipid Profile; Future  -     Hemoglobin A1c; Future  -     CBC with platelets; Future    LPRD (laryngopharyngeal reflux disease)  -     Refill famotidine (PEPCID) 20 MG tablet; Take 1 tablet (20 mg) by mouth nightly as needed for heartburn    Essential hypertension  Continue current regimen.  Blood pressure well controlled.    Follow-up: Return in about 1 year (around 4/15/2023) for physical, pap, mammogram, Routine preventive.    The patient was seen by, and discussed with, Dr. Rachael Reyes who agrees with the plan.  -----  Neli Duncan MD  PGY-3  [facility]       HAYDER Garcia is a 46 year old  female who presents to clinic today for physical exam.    Chief Complaint   Patient presents with     Physical     Physical will talk to  About papsmear     Mother just got diagnosed with diabetes - age 70s. - wants to be tested  Patient does not want Pap smear today.  Prefers to put this off another visit.    Review of Systems   Constitutional: Negative for chills, fatigue, fever and unexpected weight change.   HENT: Negative for congestion, rhinorrhea and sore throat.    Eyes: Negative for visual disturbance.   Respiratory: Negative for cough and shortness of breath.    Cardiovascular: Negative for chest pain and palpitations.   Gastrointestinal: Negative for abdominal pain, diarrhea, nausea and vomiting.  "  Genitourinary: Negative for difficulty urinating, dysuria, hematuria and menstrual problem.   Musculoskeletal: Negative for arthralgias, back pain and joint swelling.   Skin: Negative for color change and rash.   Neurological: Negative for seizures and syncope.   Psychiatric/Behavioral: Negative for agitation and dysphoric mood.   All other systems reviewed and are negative.        HEALTH HISTORY     Past Medical History:   Diagnosis Date     Essential hypertension 2019     Gastroesophageal reflux disease      Motion sickness      Other chronic pain         Current Outpatient Medications   Medication     hydrochlorothiazide (HYDRODIURIL) 12.5 MG tablet     famotidine (PEPCID) 10 MG tablet     No current facility-administered medications for this visit.     Family History reviewed.  Mother with recent dx of diabetes  - diet controlled    RISK BEHAVIORS AND HEALTHY HABITS  Tobacco Use/Smoking: None  Illicit Drug Use: None  Alcohol use: None  Sexually Active: yes  Sexual concerns: none  STD History: none    Contraception: nothing and not interested in any at this time   P: 2  Last Pap Smear Date:   Abnormal Pap History: never    Diet (5-7 servings of fruits/veg daily): prob 3 servings -- will increase this  Exercise (30 min accumulated most days): 5-6 days a week, walks  Dental Care: upcoming visit in   Calcium 1500 mg/d: drinks milk 1 cup daily    Has anyone hurt you physically, for example by pushing, hitting, slapping or kicking you or forcing you to have sex? Denies  Do you feel threatened or controlled by a partner, ex-partner or anyone in your life? Denies        OBJECTIVE   /86   Pulse 73   Temp 98.2  F (36.8  C) (Oral)   Resp 20   Ht 1.549 m (5' 1\")   Wt 58.6 kg (129 lb 3.2 oz)   SpO2 100%   BMI 24.41 kg/m       Physical Exam  Constitutional:       General: She is not in acute distress.     Appearance: She is normal weight. She is not toxic-appearing.   HENT:      Head: " Normocephalic and atraumatic.   Eyes:      Extraocular Movements: Extraocular movements intact.      Conjunctiva/sclera: Conjunctivae normal.      Pupils: Pupils are equal, round, and reactive to light.   Cardiovascular:      Rate and Rhythm: Normal rate and regular rhythm.      Pulses: Normal pulses.      Heart sounds: Normal heart sounds. No murmur heard.  Pulmonary:      Effort: Pulmonary effort is normal. No respiratory distress.      Breath sounds: Normal breath sounds. No stridor. No wheezing or rhonchi.   Abdominal:      General: Bowel sounds are normal. There is no distension.      Palpations: Abdomen is soft. There is no mass.      Tenderness: There is no abdominal tenderness.   Musculoskeletal:         General: Normal range of motion.      Cervical back: Normal range of motion and neck supple. No rigidity. No muscular tenderness.      Right lower leg: No edema.      Left lower leg: No edema.   Skin:     General: Skin is warm and dry.      Coloration: Skin is not jaundiced or pale.   Neurological:      General: No focal deficit present.      Mental Status: She is alert and oriented to person, place, and time.   Psychiatric:         Mood and Affect: Mood normal.         Behavior: Behavior normal.

## 2022-04-18 ENCOUNTER — TELEPHONE (OUTPATIENT)
Dept: FAMILY MEDICINE | Facility: CLINIC | Age: 47
End: 2022-04-18

## 2022-04-18 NOTE — TELEPHONE ENCOUNTER
[facility] phone call message- patient requesting a refill:    Full Medication Name: famotidine (PEPCID) 20 MG tablet      Pharmacy confirmed as     [id number] - [place], [place] - [address] [place] [address]  Phone: [phone] Fax: [phone]  : Yes    Additional Comments: Pt called because her pharmacy needs a prior authorization for the above medication due to dose increase.     OK to leave a message on voice mail? Yes    Primary language: English      needed? No    Call taken on April 18, 2022 at 9:41 AM by Melia Reynolds

## 2022-04-19 RX ORDER — FAMOTIDINE 10 MG
20 TABLET ORAL DAILY
Qty: 60 TABLET | Refills: 4 | Status: SHIPPED | OUTPATIENT
Start: 2022-04-19 | End: 2022-05-13

## 2022-04-19 NOTE — TELEPHONE ENCOUNTER
Insurance not covering famotidine 20mg.    Will try 10 mg tabs.    Call pt to see if this works or can buy OTC    ----  Neli Duncan MD  PGY-3  Family Medicine Resident

## 2022-04-20 NOTE — TELEPHONE ENCOUNTER
Called notify patient.  Patient understand.  No further questions or concerns.      Williams Montejo MA

## 2022-05-03 DIAGNOSIS — K21.9 LPRD (LARYNGOPHARYNGEAL REFLUX DISEASE): ICD-10-CM

## 2022-05-13 RX ORDER — FAMOTIDINE 20 MG/1
20 TABLET, FILM COATED ORAL 2 TIMES DAILY
Qty: 180 TABLET | Refills: 1 | Status: SHIPPED | OUTPATIENT
Start: 2022-05-13 | End: 2023-01-16

## 2022-08-19 ENCOUNTER — OFFICE VISIT (OUTPATIENT)
Dept: FAMILY MEDICINE | Facility: CLINIC | Age: 47
End: 2022-08-19
Payer: COMMERCIAL

## 2022-08-19 VITALS
OXYGEN SATURATION: 100 % | WEIGHT: 131.6 LBS | BODY MASS INDEX: 24.84 KG/M2 | RESPIRATION RATE: 20 BRPM | TEMPERATURE: 98 F | HEIGHT: 61 IN | HEART RATE: 71 BPM | SYSTOLIC BLOOD PRESSURE: 130 MMHG | DIASTOLIC BLOOD PRESSURE: 86 MMHG

## 2022-08-19 DIAGNOSIS — M72.2 PLANTAR FASCIITIS: Primary | ICD-10-CM

## 2022-08-19 PROCEDURE — 99213 OFFICE O/P EST LOW 20 MIN: CPT | Mod: GC

## 2022-08-19 NOTE — PROGRESS NOTES
"  Assessment & Plan     Plantar fasciitis  Patient presenting for first visit regarding months of right plantar heel pain worse in the morning when she gets out of bed and exacerbated by overuse, this is most consistent with plantar fasciitis.  -Education provided around supportive footwear including avoiding being barefoot or using flat and nonsupportive footwear even while at home as this may exacerbate discomfort  -Provided education around stretching  -Provided education around icing the area and could use over-the-counter diclofenac gel if desired  -If not improving can consider night splint or foot taping  -If not improving with conservative measures can consider referral to podiatry      Return if symptoms worsen or fail to improve.    Luci Romero MD  [facility] ANGELO Brown is a 46 year old presenting for the following health issues:  Musculoskeletal Problem (Foot pain and tingling  usually on the right side some on left )    R foot pain started in May, got a little better but then worse again in August. Has not had this seen before, no trauma to area.     Ocassional tingling in R hand after bending arm during sleep. Not bothering patient though brings this up as she is concerned they may be related to her foot pain. Worried there is an underlying cause.     Review of Systems   As above      Objective    /86   Pulse 71   Temp 98  F (36.7  C) (Oral)   Resp 20   Ht 1.549 m (5' 1\")   Wt 59.7 kg (131 lb 9.6 oz)   SpO2 100%   BMI 24.87 kg/m    Body mass index is 24.87 kg/m .  Physical Exam  Constitutional:       General: She is not in acute distress.     Appearance: Normal appearance. She is not ill-appearing.   Eyes:      Extraocular Movements: Extraocular movements intact.      Conjunctiva/sclera: Conjunctivae normal.   Pulmonary:      Effort: Pulmonary effort is normal. No respiratory distress.   Musculoskeletal:         General: Normal range of motion.      Right " lower leg: No edema.      Left lower leg: No edema.      Right foot: Normal range of motion. No deformity.      Left foot: Normal range of motion. No deformity.        Feet:    Feet:      Right foot:      Skin integrity: Skin integrity normal.      Left foot:      Skin integrity: Skin integrity normal.   Skin:     General: Skin is warm and dry.      Findings: No lesion or rash.   Neurological:      General: No focal deficit present.      Mental Status: She is alert and oriented to person, place, and time.      Gait: Gait normal.   Psychiatric:         Mood and Affect: Mood normal.         Behavior: Behavior normal.

## 2022-09-23 ENCOUNTER — IMMUNIZATION (OUTPATIENT)
Dept: FAMILY MEDICINE | Facility: CLINIC | Age: 47
End: 2022-09-23
Payer: COMMERCIAL

## 2022-09-23 VITALS — TEMPERATURE: 98.3 F

## 2022-09-23 DIAGNOSIS — Z23 HIGH PRIORITY FOR 2019-NCOV VACCINE: ICD-10-CM

## 2022-09-23 DIAGNOSIS — Z23 NEED FOR PROPHYLACTIC VACCINATION AND INOCULATION AGAINST INFLUENZA: ICD-10-CM

## 2022-09-23 PROCEDURE — 91312 COVID-19,PF,PFIZER BOOSTER BIVALENT: CPT

## 2022-09-23 PROCEDURE — 0124A COVID-19,PF,PFIZER BOOSTER BIVALENT: CPT

## 2022-09-23 PROCEDURE — 90686 IIV4 VACC NO PRSV 0.5 ML IM: CPT

## 2022-09-23 PROCEDURE — 90471 IMMUNIZATION ADMIN: CPT

## 2022-09-23 PROCEDURE — 99207 PR NO CHARGE LOS: CPT

## 2022-11-10 ENCOUNTER — OFFICE VISIT (OUTPATIENT)
Dept: FAMILY MEDICINE | Facility: CLINIC | Age: 47
End: 2022-11-10
Payer: COMMERCIAL

## 2022-11-10 VITALS
WEIGHT: 132.6 LBS | SYSTOLIC BLOOD PRESSURE: 132 MMHG | DIASTOLIC BLOOD PRESSURE: 84 MMHG | HEART RATE: 82 BPM | RESPIRATION RATE: 16 BRPM | OXYGEN SATURATION: 98 % | TEMPERATURE: 98.6 F | BODY MASS INDEX: 25.05 KG/M2

## 2022-11-10 DIAGNOSIS — I10 ESSENTIAL HYPERTENSION: Primary | ICD-10-CM

## 2022-11-10 PROCEDURE — 99213 OFFICE O/P EST LOW 20 MIN: CPT | Mod: GC

## 2022-11-10 RX ORDER — HYDROCHLOROTHIAZIDE 12.5 MG/1
12.5 TABLET ORAL DAILY
Qty: 90 TABLET | Refills: 3 | Status: SHIPPED | OUTPATIENT
Start: 2022-11-10 | End: 2024-02-02

## 2022-11-10 NOTE — PROGRESS NOTES
Assessment & Plan     Essential hypertension  46yo female relatively healthy other than recent diagnosis of hypertension. She is here today to follow up on blood pressure. She does not check her blood pressure at home as that makes her more anxious. She has been taking hydrochlorothiazide 12.5 mg daily. She denies side effects from this medication. Per current guidelines, BP at goal for her age group. /84 today. Will continue hydrochlorothiazide 12.5 mg daily. Follow up with yearly for annual physical and BP recheck.   - hydrochlorothiazide (HYDRODIURIL) 12.5 MG tablet  Dispense: 90 tablet; Refill: 3  Return if symptoms worsen or fail to improve.    Gold Cronin DO, PGY-2  [facility]    Today I precepted with Dr. Merlene MD, who agrees with the assessment and plan.      Tristan Brown is a 47 year old, presenting for the following health issues:  Hypertension (Check BP/Refill BP medication )      CLAUDETTE Garcia is an 47 year old female who presents for evaluation of   hypertension. She indicates that she is feeling well and   denies any symptoms referable to her elevated blood pressure.   Specifically denies chest pain, palpitations, dyspnea, orthopnea,   PND or peripheral edema. Current medication regimen is as listed   below. Patient denies any side effects of medication.    Family history: positive for diabetes mellitus in diabetes in both parents   Age at onset of elevated blood pressure: age of 45   Cardiovascular risk factors: family history, sedentary life style and stress  Use of agents associated with hypertension: none  History of renal disease: negative  History of flank trauma: negative    Current Outpatient Medications   Medication     famotidine (PEPCID) 20 MG tablet     hydrochlorothiazide (HYDRODIURIL) 12.5 MG tablet     No current facility-administered medications for this visit.     Allergies   Allergen Reactions     Nkda [No Known Drug Allergies]         Social History     Tobacco Use     Smoking status: Never     Smokeless tobacco: Never     Tobacco comments:      smokes   Substance Use Topics     Alcohol use: No     Alcohol/week: 0.0 standard drinks       Review of Systems   Constitutional, HEENT, cardiovascular, pulmonary, gi and gu systems are negative, except as otherwise noted.      Objective    /84 (BP Location: Left arm, Patient Position: Sitting, Cuff Size: Adult Regular)   Pulse 82   Temp 98.6  F (37  C) (Oral)   Resp 16   Wt 60.1 kg (132 lb 9.6 oz)   SpO2 98%   BMI 25.05 kg/m    Body mass index is 25.05 kg/m .  Physical Exam   GENERAL: healthy, alert and no distress  NECK: no adenopathy, no asymmetry, masses, or scars and thyroid normal to palpation  RESP: lungs clear to auscultation - no rales, rhonchi or wheezes  CV: regular rate and rhythm, normal S1 S2, no S3 or S4, no murmur, click or rub, no peripheral edema and peripheral pulses strong  ABDOMEN: soft, nontender, no hepatosplenomegaly, no masses and bowel sounds normal  MS: no gross musculoskeletal defects noted, no edema

## 2022-11-10 NOTE — PATIENT INSTRUCTIONS
Thank you for trusting us with your care.     Here's a summary of the visit today:   Blood pressure looks good today  Continue to take your blood pressure medication as prescribed   Follow up with any new symptoms         Thank you.     Dr. Cronin

## 2022-11-17 NOTE — PROGRESS NOTES
Preceptor Attestation:    I discussed the patient with the resident and evaluated the patient in person. I have verified the content of the note, which accurately reflects my assessment of the patient and the plan of care.   Supervising Physician:  Edenilson Blunt MD.

## 2022-11-21 ENCOUNTER — HEALTH MAINTENANCE LETTER (OUTPATIENT)
Age: 47
End: 2022-11-21

## 2023-01-16 ENCOUNTER — OFFICE VISIT (OUTPATIENT)
Dept: SURGERY | Facility: CLINIC | Age: 48
End: 2023-01-16
Payer: COMMERCIAL

## 2023-01-16 VITALS — SYSTOLIC BLOOD PRESSURE: 130 MMHG | DIASTOLIC BLOOD PRESSURE: 80 MMHG

## 2023-01-16 DIAGNOSIS — K21.9 LPRD (LARYNGOPHARYNGEAL REFLUX DISEASE): ICD-10-CM

## 2023-01-16 DIAGNOSIS — K21.9 GASTROESOPHAGEAL REFLUX DISEASE WITHOUT ESOPHAGITIS: Primary | ICD-10-CM

## 2023-01-16 PROCEDURE — 99212 OFFICE O/P EST SF 10 MIN: CPT | Performed by: SURGERY

## 2023-01-16 RX ORDER — FAMOTIDINE 40 MG/1
40 TABLET, FILM COATED ORAL AT BEDTIME
COMMUNITY
Start: 2022-12-27 | End: 2023-03-27

## 2023-01-16 RX ORDER — FAMOTIDINE 20 MG/1
20 TABLET, FILM COATED ORAL 2 TIMES DAILY
Qty: 180 TABLET | Refills: 1 | Status: SHIPPED | OUTPATIENT
Start: 2023-01-16 | End: 2023-11-06

## 2023-01-16 NOTE — PROGRESS NOTES
HPI: Stephanie Garcia is here for follow up to discuss her reflux.  She continues to have intermittent discomfort in the epigastric and chest described as pinching as well as mild irritation when laying flat.  Denies any nighttime cough or regurgitation.    Allergies, Medications, Social History, Past Medical History and Past Surgical History were reviewed and are noted in the chart.    /80   There is no height or weight on file to calculate BMI.      EXAM:   GENERAL: Appears well           Assessment/Plan: Stephanie Garcia continues to have reflux-like symptoms.  She does have a small hiatal hernia noted on her last EGD.  Esophagram was relatively unremarkable.  At this point we discussed plans for pH probe.  I will refer her to [facility] for a Bravo probe placement.  Once this is complete we will discuss options moving forward over the phone.  In the meantime, she will continue with dietary and lifestyle management strategies.    Luis Marin DO [facility] Department of Surgery

## 2023-01-16 NOTE — LETTER
1/16/2023         RE: Stephanie Garcia  [address]  [place] [address]        Dear Colleague,    Thank you for referring your patient, Stephanie Garcia, to the [facility] SURGERY CLINIC AND BARIATRICS CARE [place]. Please see a copy of my visit note below.     HPI: Stephanie Garcia is here for follow up to discuss her reflux.  She continues to have intermittent discomfort in the epigastric and chest described as pinching as well as mild irritation when laying flat.  Denies any nighttime cough or regurgitation.    Allergies, Medications, Social History, Past Medical History and Past Surgical History were reviewed and are noted in the chart.    /80   There is no height or weight on file to calculate BMI.      EXAM:   GENERAL: Appears well           Assessment/Plan: Stephanie Garcia continues to have reflux-like symptoms.  She does have a small hiatal hernia noted on her last EGD.  Esophagram was relatively unremarkable.  At this point we discussed plans for pH probe.  I will refer her to [facility] for a Bravo probe placement.  Once this is complete we will discuss options moving forward over the phone.  In the meantime, she will continue with dietary and lifestyle management strategies.    Luis Marin, DO [facility] Department of Surgery      Again, thank you for allowing me to participate in the care of your patient.        Sincerely,        Luis Marin, DO

## 2023-01-17 ENCOUNTER — TELEPHONE (OUTPATIENT)
Dept: SURGERY | Facility: CLINIC | Age: 48
End: 2023-01-17
Payer: COMMERCIAL

## 2023-01-17 NOTE — TELEPHONE ENCOUNTER
Referral placed for BRAVO through [facility] online portal. Pt will be contacted by [facility] scheduling to set this up.

## 2023-01-17 NOTE — TELEPHONE ENCOUNTER
----- Message from Luis Marin, DO sent at 1/16/2023  9:10 AM CST -----  Can we get her set up for a Bravo probe with [facility].  Thanks  dilan

## 2023-02-07 NOTE — PROGRESS NOTES
We scheduled the Bravo Upper Endoscopy order for Stephanie Garcia. The appointment is with Geoff Lockett MD at [facility] on 02/08/2023 at 09:30 AM.

## 2023-02-08 ENCOUNTER — TRANSFERRED RECORDS (OUTPATIENT)
Dept: HEALTH INFORMATION MANAGEMENT | Facility: CLINIC | Age: 48
End: 2023-02-08
Payer: COMMERCIAL

## 2023-02-08 ENCOUNTER — TRANSFERRED RECORDS (OUTPATIENT)
Dept: SURGERY | Facility: CLINIC | Age: 48
End: 2023-02-08

## 2023-02-10 NOTE — TELEPHONE ENCOUNTER
Pt had Bravo completed on 2/8/23. I called [facility] and left a voicemail with medical records requesting the report.       [facility]     Khadijah Montejo  RN, BSN  [facility]  General Surgery  [address] [place] [id number]  [email or web address]   Office:[phone]  Employed by [employer],

## 2023-02-13 NOTE — TELEPHONE ENCOUNTER
Called [facility] and asked for pt's Price report be faxed to our clinic, as well as a follow up by phone. This was completed on 2/8/23.

## 2023-02-16 ENCOUNTER — TRANSFERRED RECORDS (OUTPATIENT)
Dept: HEALTH INFORMATION MANAGEMENT | Facility: CLINIC | Age: 48
End: 2023-02-16
Payer: COMMERCIAL

## 2023-02-23 NOTE — TELEPHONE ENCOUNTER
Called [facility] to request BRAVO results to be sent over today. Pt will be scheduled for a phone follow up with Dr. Marin to discuss results.

## 2023-02-28 ENCOUNTER — TELEPHONE (OUTPATIENT)
Dept: SURGERY | Facility: CLINIC | Age: 48
End: 2023-02-28
Payer: COMMERCIAL

## 2023-02-28 NOTE — TELEPHONE ENCOUNTER
I called Stephanie to discuss her Price pH study.  Unfortunately, she was not home but I did leave a message for her to call back.     Yosef Marin DO [facility] Surgery  [phone]

## 2023-03-06 NOTE — TELEPHONE ENCOUNTER
Patient calling back she still has not received a call back yet.  She is aware Dr. Marin is out this week but wants a call back from someone today anytime.    Thanks!

## 2023-03-09 NOTE — TELEPHONE ENCOUNTER
I spoke with Stephanie and she is OK waiting to speak with Dr. Marin when he is back in the office. Briefly reviewed the report that did not show significant reflux and asked how she has been doing since her last visit with Dr. Marin. She has been trying to modify certain foods that make her reflux worse, such as tomatoes and spicy foods. She is taking famotidine 20 mg with slight improvement. I told her that I would make sure her and Dr. aMrin can connect next week to review results in further detail and discuss a plan. She was very understanding and is happy with this plan.

## 2023-03-09 NOTE — TELEPHONE ENCOUNTER
Patient would like a call back she would be able to take a call anytime on Mondays and Thursdays and anytime after 1:00 any other day.  If Dr. Marin can please call next week.

## 2023-03-17 NOTE — PROGRESS NOTES
"  The patient has been notified of following:     \"This telephone visit will be conducted via a call between you and your physician/provider. We have found that certain health care needs can be provided without the need for a physical exam.  This service lets us provide the care you need with a short phone conversation.  If a prescription is necessary we can send it directly to your pharmacy.  If lab work is needed we can place an order for that and you can then stop by our lab to have the test done at a later time.    Telephone visits are billed at different rates depending on your insurance coverage. During this emergency period, for some insurers they may be billed the same as an in-person visit.  Please reach out to your insurance provider with any questions.    If during the course of the call the physician/provider feels a telephone visit is not appropriate, you will not be charged for this service.\"    Patient has given verbal consent to a Telephone visit? Yes    What phone number would you like to be contacted at? [phone]    Patient would like to receive their [facility]          Distant Location (provider location):  On-site    Additional provider notes: The office attempted to reach her for preemptive phone call.  She was not home.    Phone call duration: 0 minutes      "

## 2023-03-22 ENCOUNTER — VIRTUAL VISIT (OUTPATIENT)
Dept: SURGERY | Facility: CLINIC | Age: 48
End: 2023-03-22
Payer: COMMERCIAL

## 2023-03-22 DIAGNOSIS — K21.9 GASTROESOPHAGEAL REFLUX DISEASE WITHOUT ESOPHAGITIS: Primary | ICD-10-CM

## 2023-03-22 NOTE — LETTER
"    3/22/2023         RE: Stephanie Garcia  [address]  [place] [address]        Dear Colleague,    Thank you for referring your patient, Stephanie Garcia, to the [facility] SURGERY CLINIC AND BARIATRICS CARE [place]. Please see a copy of my visit note below.      The patient has been notified of following:     \"This telephone visit will be conducted via a call between you and your physician/provider. We have found that certain health care needs can be provided without the need for a physical exam.  This service lets us provide the care you need with a short phone conversation.  If a prescription is necessary we can send it directly to your pharmacy.  If lab work is needed we can place an order for that and you can then stop by our lab to have the test done at a later time.    Telephone visits are billed at different rates depending on your insurance coverage. During this emergency period, for some insurers they may be billed the same as an in-person visit.  Please reach out to your insurance provider with any questions.    If during the course of the call the physician/provider feels a telephone visit is not appropriate, you will not be charged for this service.\"    Patient has given verbal consent to a Telephone visit? Yes    What phone number would you like to be contacted at? [phone]    Patient would like to receive their AVS by [facility]          Distant Location (provider location):  On-site    Additional provider notes: The office attempted to reach her for preemptive phone call.  She was not home.    Phone call duration: 0 minutes          Again, thank you for allowing me to participate in the care of your patient.        Sincerely,        Luis Marin, DO    "

## 2023-03-23 ENCOUNTER — TELEPHONE (OUTPATIENT)
Dept: SURGERY | Facility: CLINIC | Age: 48
End: 2023-03-23
Payer: COMMERCIAL

## 2023-03-23 NOTE — TELEPHONE ENCOUNTER
Patient called she had a tele visit with Dr. Marin yesterday  and she said he never called.  She has been waiting weeks to talk to him, she is very frustrated and would like to get her results.  She wants a call back today.    She also needs her Acid Reflux  RX changed from 20 mg to 40mg back in January she accidentally told him 20 mg instead of 40.    Please call and advise.    Thanks!

## 2023-03-27 ENCOUNTER — VIRTUAL VISIT (OUTPATIENT)
Dept: SURGERY | Facility: CLINIC | Age: 48
End: 2023-03-27
Payer: COMMERCIAL

## 2023-03-27 DIAGNOSIS — K21.9 LPRD (LARYNGOPHARYNGEAL REFLUX DISEASE): Primary | ICD-10-CM

## 2023-03-27 DIAGNOSIS — K21.9 GASTROESOPHAGEAL REFLUX DISEASE WITHOUT ESOPHAGITIS: Primary | ICD-10-CM

## 2023-03-27 RX ORDER — FAMOTIDINE 40 MG/1
40 TABLET, FILM COATED ORAL AT BEDTIME
Qty: 90 TABLET | Refills: 1 | Status: SHIPPED | OUTPATIENT
Start: 2023-03-27 | End: 2024-05-06

## 2023-03-27 NOTE — LETTER
"    3/27/2023         RE: Stephanie Garcia  [address]  [place] [address]        Dear Colleague,    Thank you for referring your patient, Stephanie Garcia, to the [facility] SURGERY CLINIC AND BARIATRICS CARE [place]. Please see a copy of my visit note below.      The patient has been notified of following:     \"This telephone visit will be conducted via a call between you and your physician/provider. We have found that certain health care needs can be provided without the need for a physical exam.  This service lets us provide the care you need with a short phone conversation.  If a prescription is necessary we can send it directly to your pharmacy.  If lab work is needed we can place an order for that and you can then stop by our lab to have the test done at a later time.    Telephone visits are billed at different rates depending on your insurance coverage. During this emergency period, for some insurers they may be billed the same as an in-person visit.  Please reach out to your insurance provider with any questions.    If during the course of the call the physician/provider feels a telephone visit is not appropriate, you will not be charged for this service.\"    Patient has given verbal consent to a Telephone visit? Yes    What phone number would you like to be contacted at? [phone]    Patient would like to receive their AVS by [facility]            Distant Location (provider location):  Off-site    Additional provider notes: I spoke with Stephanie regarding the EGD and pH study for [facility].  There is no evidence of any significant reflux causing her symptoms.  She doing relatively well but does note that she continues to have epigastric discomfort at times when laying down at night.  She has no problems with eating and has no reflux.  After our discussion, the plan will be for maintaining a food journal to see if there is any particular foods that increase her symptoms.  " We will hold off on any further work-up at this time as her symptoms seem to be somewhat stable.  I will additionally continue to order her Pepcid.  However, I have recommended that she try to wean herself off these if possible.  At this point she will follow-up with me in 1 year for ongoing discussion or sooner if she has any worsening symptoms.    Phone call duration: 15 minutes    Yosef Marin DO [facility]  [phone]            Again, thank you for allowing me to participate in the care of your patient.        Sincerely,        Luis Marin DO

## 2023-03-27 NOTE — PROGRESS NOTES
"  The patient has been notified of following:     \"This telephone visit will be conducted via a call between you and your physician/provider. We have found that certain health care needs can be provided without the need for a physical exam.  This service lets us provide the care you need with a short phone conversation.  If a prescription is necessary we can send it directly to your pharmacy.  If lab work is needed we can place an order for that and you can then stop by our lab to have the test done at a later time.    Telephone visits are billed at different rates depending on your insurance coverage. During this emergency period, for some insurers they may be billed the same as an in-person visit.  Please reach out to your insurance provider with any questions.    If during the course of the call the physician/provider feels a telephone visit is not appropriate, you will not be charged for this service.\"    Patient has given verbal consent to a Telephone visit? Yes    What phone number would you like to be contacted at? [phone]    Patient would like to receive their AVS by [facility]            Distant Location (provider location):  Off-site    Additional provider notes: I spoke with Stephanie regarding the EGD and pH study for [facility].  There is no evidence of any significant reflux causing her symptoms.  She doing relatively well but does note that she continues to have epigastric discomfort at times when laying down at night.  She has no problems with eating and has no reflux.  After our discussion, the plan will be for maintaining a food journal to see if there is any particular foods that increase her symptoms.  We will hold off on any further work-up at this time as her symptoms seem to be somewhat stable.  I will additionally continue to order her Pepcid.  However, I have recommended that she try to wean herself off these if possible.  At this point she will follow-up with me in 1 year for " ongoing discussion or sooner if she has any worsening symptoms.    Phone call duration: 15 minutes    Yosef Marin [facility] Surgery  [phone]

## 2023-04-13 ASSESSMENT — ENCOUNTER SYMPTOMS
ARTHRALGIAS: 0
FREQUENCY: 0
SHORTNESS OF BREATH: 0
CONSTIPATION: 0
ABDOMINAL PAIN: 0
MYALGIAS: 0
DYSURIA: 0
PARESTHESIAS: 0
COUGH: 0
SORE THROAT: 0
FEVER: 0
JOINT SWELLING: 0
PALPITATIONS: 0
CHILLS: 0
EYE PAIN: 0
NAUSEA: 0
HEMATURIA: 0
HEADACHES: 0
HEMATOCHEZIA: 0
DIARRHEA: 0
WEAKNESS: 0
NERVOUS/ANXIOUS: 0
DIZZINESS: 0
HEARTBURN: 0

## 2023-04-17 ENCOUNTER — OFFICE VISIT (OUTPATIENT)
Dept: FAMILY MEDICINE | Facility: CLINIC | Age: 48
End: 2023-04-17
Payer: COMMERCIAL

## 2023-04-17 VITALS
HEART RATE: 75 BPM | RESPIRATION RATE: 20 BRPM | OXYGEN SATURATION: 100 % | WEIGHT: 128.8 LBS | TEMPERATURE: 97.6 F | SYSTOLIC BLOOD PRESSURE: 122 MMHG | DIASTOLIC BLOOD PRESSURE: 78 MMHG | BODY MASS INDEX: 25.29 KG/M2 | HEIGHT: 60 IN

## 2023-04-17 DIAGNOSIS — Z12.11 SCREEN FOR COLON CANCER: ICD-10-CM

## 2023-04-17 DIAGNOSIS — Z12.31 VISIT FOR SCREENING MAMMOGRAM: ICD-10-CM

## 2023-04-17 DIAGNOSIS — Z00.00 ROUTINE GENERAL MEDICAL EXAMINATION AT A HEALTH CARE FACILITY: ICD-10-CM

## 2023-04-17 DIAGNOSIS — Z12.4 CERVICAL CANCER SCREENING: ICD-10-CM

## 2023-04-17 DIAGNOSIS — Z00.00 PREVENTATIVE HEALTH CARE: Primary | ICD-10-CM

## 2023-04-17 LAB
ANION GAP SERPL CALCULATED.3IONS-SCNC: 11 MMOL/L (ref 7–15)
BUN SERPL-MCNC: 15.6 MG/DL (ref 6–20)
CALCIUM SERPL-MCNC: 9.6 MG/DL (ref 8.6–10)
CHLORIDE SERPL-SCNC: 102 MMOL/L (ref 98–107)
CREAT SERPL-MCNC: 0.71 MG/DL (ref 0.51–0.95)
DEPRECATED HCO3 PLAS-SCNC: 27 MMOL/L (ref 22–29)
GFR SERPL CREATININE-BSD FRML MDRD: >90 ML/MIN/1.73M2
GLUCOSE SERPL-MCNC: 96 MG/DL (ref 70–99)
HBA1C MFR BLD: 5.7 % (ref 0–5.6)
POTASSIUM SERPL-SCNC: 3.7 MMOL/L (ref 3.4–5.3)
SODIUM SERPL-SCNC: 140 MMOL/L (ref 136–145)
TSH SERPL DL<=0.005 MIU/L-ACNC: 2.68 UIU/ML (ref 0.3–4.2)

## 2023-04-17 PROCEDURE — G0124 SCREEN C/V THIN LAYER BY MD: HCPCS | Performed by: PATHOLOGY

## 2023-04-17 PROCEDURE — 87624 HPV HI-RISK TYP POOLED RSLT: CPT

## 2023-04-17 PROCEDURE — 84443 ASSAY THYROID STIM HORMONE: CPT

## 2023-04-17 PROCEDURE — 36415 COLL VENOUS BLD VENIPUNCTURE: CPT

## 2023-04-17 PROCEDURE — 80048 BASIC METABOLIC PNL TOTAL CA: CPT

## 2023-04-17 PROCEDURE — 99396 PREV VISIT EST AGE 40-64: CPT | Mod: GC

## 2023-04-17 PROCEDURE — G0145 SCR C/V CYTO,THINLAYER,RESCR: HCPCS

## 2023-04-17 PROCEDURE — 83036 HEMOGLOBIN GLYCOSYLATED A1C: CPT

## 2023-04-17 ASSESSMENT — ENCOUNTER SYMPTOMS
HEMATURIA: 0
HEADACHES: 0
ABDOMINAL PAIN: 0
EYE PAIN: 0
NERVOUS/ANXIOUS: 0
HEMATOCHEZIA: 0
NAUSEA: 0
PARESTHESIAS: 0
FREQUENCY: 0
DIZZINESS: 0
HEARTBURN: 0
DIARRHEA: 0
CHILLS: 0
PALPITATIONS: 0
JOINT SWELLING: 0
ARTHRALGIAS: 0
FEVER: 0
SHORTNESS OF BREATH: 0
WEAKNESS: 0
DYSURIA: 0
MYALGIAS: 0
SORE THROAT: 0
COUGH: 0
CONSTIPATION: 0

## 2023-04-17 NOTE — PROGRESS NOTES
SUBJECTIVE:   CC: Stephanie is an 47 year old who presents for preventive health visit.       4/17/2023     9:52 AM   Additional Questions   Roomed by nuvia   Accompanied by self     Healthy Habits:     Getting at least 3 servings of Calcium per day:  Yes    Bi-annual eye exam:  NO    Dental care twice a year:  NO    Sleep apnea or symptoms of sleep apnea:  None    Diet:  Low salt    Frequency of exercise:  2-3 days/week    Duration of exercise:  15-30 minutes    Taking medications regularly:  Yes    Medication side effects:  Not applicable    PHQ-2 Total Score: 0    Additional concerns today:  Yes      Gerd/Heartburn  Currently taking famotidine for GERD. Is experiencing some gas with the medication. GERD symptoms mainly at night. Tries to sleep with head propped. Is following with GI for this.     Essential hypertension  Currently taking hydrochlorothiazide. No concerns with medication. Rarely misses dose.     Today's PHQ-2 Score:       4/17/2023     9:51 AM   PHQ-2 ( 1999 [employer])   Q1: Little interest or pleasure in doing things 0   Q2: Feeling down, depressed or hopeless 0   PHQ-2 Score 0       Social History   Lives at home with kids (18 and 5) and . No pets.   Tobacco Use     Smoking status: Never     Smokeless tobacco: Never     Tobacco comments:      smokes   Vaping Use     Vaping status: Not on file   Substance Use Topics     Alcohol use: No     Alcohol/week: 0.0 standard drinks of alcohol           4/13/2023     9:40 PM   Alcohol Use   Prescreen: >3 drinks/day or >7 drinks/week? Not Applicable     Reviewed orders with patient.  Reviewed health maintenance and updated orders accordingly - Yes      Breast Cancer Screening:    FHS-7:        View : No data to display.                Mammogram Screening: Recommended annual mammography  Pertinent mammograms are reviewed under the imaging tab.    History of abnormal Pap smear: NO - age 30-65 PAP every 5 years with negative HPV co-testing  "recommended      4/17/2023    10:29 AM   PAP / HPV   PAP Negative for Intraepithelial Lesion or Malignancy (NILM)       Reviewed and updated as needed this visit by clinical staff   Tobacco  Allergies  Meds              Reviewed and updated as needed this visit by Provider                  Review of Systems   Constitutional: Negative for chills and fever.   HENT: Negative for congestion, ear pain, hearing loss and sore throat.    Eyes: Negative for pain and visual disturbance.   Respiratory: Negative for cough and shortness of breath.    Cardiovascular: Negative for palpitations and peripheral edema.   Gastrointestinal: Negative for abdominal pain, constipation, diarrhea, heartburn, hematochezia and nausea.   Breasts:  Negative for tenderness and discharge.   Genitourinary: Negative for dysuria, frequency, genital sores, hematuria, pelvic pain, urgency, vaginal bleeding and vaginal discharge.   Musculoskeletal: Negative for arthralgias, joint swelling and myalgias.   Skin: Negative for rash.   Neurological: Negative for dizziness, weakness, headaches and paresthesias.   Psychiatric/Behavioral: Negative for mood changes. The patient is not nervous/anxious.         OBJECTIVE:   /78   Pulse 75   Temp 97.6  F (36.4  C) (Oral)   Resp 20   Ht 1.532 m (5' 0.3\")   Wt 58.4 kg (128 lb 12.8 oz)   SpO2 100%   BMI 24.90 kg/m    Physical Exam  GENERAL: healthy, alert and no distress  EYES: Eyes grossly normal to inspection, PERRL and conjunctivae and sclerae normal  HENT: ear canals and TM's normal, nose and mouth without ulcers or lesions  NECK: no adenopathy, no asymmetry, masses, or scars and thyroid normal to palpation  RESP: lungs clear to auscultation - no rales, rhonchi or wheezes  CV: regular rate and rhythm, normal S1 S2, no S3 or S4, no murmur, click or rub, no peripheral edema and peripheral pulses strong  ABDOMEN: soft, nontender, no hepatosplenomegaly, no masses and bowel sounds normal   (female): " normal female external genitalia, normal urethral meatus, vaginal mucosa, normal cervix and vaginal canal without masses or discharge  MS: no gross musculoskeletal defects noted, no edema  SKIN: no suspicious lesions or rashes  NEURO: Normal strength and tone, mentation intact and speech normal  PSYCH: mentation appears normal, affect normal/bright    ASSESSMENT/PLAN:   #Preventative health care   Ms. Stephanie Garcia is here today for a complete physical exam and preventative health care visit. Recommended dental and eye exams. Discussed cancer screenings including cervical, breast, and colon. No concerns based on physical exam or from patient. Continues to eat well on low salt and GERD friendly diet and gets exercise with elevating heart rate multiple days per week. History of normal labs in patient with some family history of high cholesterol and diabetes.   - TSH with free T4 reflex   - Basic metabolic panel  - Hemoglobin A1c  - Lipid panel   - Fecal colorectal cancer screen FIT  - schedule future MA SCREENING DIGITAL BILAT  - Pap Screen with HPV - recommended age 30-65      COUNSELING:  Reviewed preventive health counseling, as reflected in patient instructions       Regular exercise       Healthy diet/nutrition       Vision screening       Hearing screening       Colorectal Cancer Screening        Sunscreen         She reports that she has never smoked. She has never used smokeless tobacco.        Paola Ariza, MS3     I was present with the medical student who participated in the service and in the documentation of this note. I have verified the history and personally performed the physical exam and medical decision making, and have verified the content of the note, which accurately reflects my assessment of the patient and the plan of care.     Luci Romero MD  [facility]

## 2023-04-19 LAB
BKR LAB AP GYN ADEQUACY: ABNORMAL
BKR LAB AP GYN INTERPRETATION: ABNORMAL
BKR LAB AP GYN OTHER FINDINGS: ABNORMAL
BKR LAB AP HPV REFLEX: ABNORMAL
BKR LAB AP PREVIOUS ABNORMAL: ABNORMAL
PATH REPORT.COMMENTS IMP SPEC: ABNORMAL
PATH REPORT.COMMENTS IMP SPEC: ABNORMAL
PATH REPORT.RELEVANT HX SPEC: ABNORMAL

## 2023-04-21 LAB
HUMAN PAPILLOMA VIRUS 16 DNA: NEGATIVE
HUMAN PAPILLOMA VIRUS 18 DNA: NEGATIVE
HUMAN PAPILLOMA VIRUS FINAL DIAGNOSIS: NORMAL
HUMAN PAPILLOMA VIRUS OTHER HR: NEGATIVE

## 2023-04-24 PROCEDURE — 82274 ASSAY TEST FOR BLOOD FECAL: CPT

## 2023-04-25 LAB — HEMOCCULT STL QL IA: NEGATIVE

## 2023-05-18 ENCOUNTER — ANCILLARY PROCEDURE (OUTPATIENT)
Dept: MAMMOGRAPHY | Facility: CLINIC | Age: 48
End: 2023-05-18
Attending: FAMILY MEDICINE
Payer: COMMERCIAL

## 2023-05-18 DIAGNOSIS — Z00.00 PREVENTATIVE HEALTH CARE: ICD-10-CM

## 2023-05-18 PROCEDURE — 77067 SCR MAMMO BI INCL CAD: CPT | Mod: TC | Performed by: RADIOLOGY

## 2023-05-19 ENCOUNTER — OFFICE VISIT (OUTPATIENT)
Dept: FAMILY MEDICINE | Facility: CLINIC | Age: 48
End: 2023-05-19
Payer: COMMERCIAL

## 2023-05-19 VITALS
DIASTOLIC BLOOD PRESSURE: 75 MMHG | WEIGHT: 128.4 LBS | BODY MASS INDEX: 24.83 KG/M2 | OXYGEN SATURATION: 99 % | TEMPERATURE: 97 F | SYSTOLIC BLOOD PRESSURE: 121 MMHG | HEART RATE: 76 BPM

## 2023-05-19 DIAGNOSIS — R73.03 PREDIABETES: Primary | ICD-10-CM

## 2023-05-19 PROCEDURE — 99212 OFFICE O/P EST SF 10 MIN: CPT | Mod: 25

## 2023-05-19 PROCEDURE — 90471 IMMUNIZATION ADMIN: CPT

## 2023-05-19 PROCEDURE — 90746 HEPB VACCINE 3 DOSE ADULT IM: CPT

## 2023-05-19 NOTE — PROGRESS NOTES
Assessment & Plan     Prediabetes  Patient presenting to discuss results of recent preventative care visit including hemoglobin A1c of 5.7.  Education provided about that this is only slightly above normal, patient preference to be proactive especially given history of both parents with type 2 diabetes.  Patient motivated to work on lifestyle.  -Education provided on preventing diabetes, behavior change, nutrition information  -Patient goal: Brown rice, walking after meals for 10 to 15 minutes 3 times per week  -Discussed steady and sustainable action rather than doing too much at once  -Can follow-up in 6 months to 1 year for lifestyle      Luci Romero MD  [facility] ANGELO Brown is a 47 year old, presenting for the following health issues:  Diabetes (Follow up )        5/19/2023     9:09 AM   Additional Questions   Roomed by marcell toussaint   Accompanied by self     HPI      -Mom and dad have diabetes  - Eating daily white rice. Has started to eat brown rice.   - Drinking more water    -Numbness and tingling in R hand.     Review of Systems   As above      Objective    /75 (BP Location: Left arm, Patient Position: Sitting, Cuff Size: Adult Regular)   Pulse 76   Temp 97  F (36.1  C) (Tympanic)   Wt 58.2 kg (128 lb 6.4 oz)   LMP 05/12/2023   SpO2 99%   BMI 24.83 kg/m    Body mass index is 24.83 kg/m .    Physical Exam  Constitutional:       General: No acute distress.     Appearance: Normal appearance. Patient not ill-appearing or diaphoretic.   Eyes:      Extraocular Movements: Extraocular movements intact.      Conjunctiva/sclera: Conjunctivae normal.   Pulmonary:      Effort: Pulmonary effort is normal. No respiratory distress.   Neurological:      General: No focal deficit present.      Mental Status: Patient is alert and oriented to person, place, and time.      Comments: No gross deficits appreciated   Psychiatric:         Attention and Perception: Attention normal.          Mood and Affect: Mood normal.         Speech: Speech normal.         Behavior: Behavior normal.

## 2023-05-19 NOTE — PROGRESS NOTES
Prior to immunization administration, verified patients identity using patient s name and date of birth. Please see Immunization Activity for additional information.     Screening Questionnaire for Adult Immunization    Are you sick today?   No   Do you have allergies to medications, food, a vaccine component or latex?   No   Have you ever had a serious reaction after receiving a vaccination?   No   Do you have a long-term health problem with heart, lung, kidney, or metabolic disease (e.g., diabetes), asthma, a blood disorder, no spleen, complement component deficiency, a cochlear implant, or a spinal fluid leak?  Are you on long-term aspirin therapy?   No   Do you have cancer, leukemia, HIV/AIDS, or any other immune system problem?   No   Do you have a parent, brother, or sister with an immune system problem?   No   In the past 3 months, have you taken medications that affect  your immune system, such as prednisone, other steroids, or anticancer drugs; drugs for the treatment of rheumatoid arthritis, Crohn s disease, or psoriasis; or have you had radiation treatments?   No   Have you had a seizure, or a brain or other nervous system problem?   No   During the past year, have you received a transfusion of blood or blood    products, or been given immune (gamma) globulin or antiviral drug?   No   For women: Are you pregnant or is there a chance you could become       pregnant during the next month?   No   Have you received any vaccinations in the past 4 weeks?   No     Immunization questionnaire answers were all negative.      Injection of Adult Hep B given by Tracie Knight MA. Patient instructed to remain in clinic for 15 minutes afterwards, and to report any adverse reactions.     Screening performed by Tracie Knight MA on 5/19/2023 at 10:19 AM.

## 2023-06-20 NOTE — PROGRESS NOTES
[facility] - Office Visit    ASSESSMENT & PLAN     Stephanie was seen today for other.    Diagnoses and all orders for this visit:    Gastroesophageal reflux disease without esophagitis  Follows with ENT due to h/o bitter taste in back of throat and hoarseness. Negative esophagogram.  ENT Dr. Manjarrez referred to Dr. Marin  (gen surg) for H+  Monitoring and further eval & management. Has been off PPI for 2 weeks.  Symptoms still persist but much improved from prior    Move famotidine from bedtime to before dinner, will consider BID dosing if no improvement    H pylori stool test    Follow-up with Dr Marin on 11/22 as scheduled    Essential hypertension  White coat syndrome with diagnosis of hypertension  BP is normal range at home, but higher in office.  Last CMP was normal in 07/2021  -     Refill hydrochlorothiazide (HYDRODIURIL) 12.5 MG tablet; Take 1 tablet (12.5 mg) by mouth daily  - consider discontinue hydrochlorothiazide in future if pt willing -- she is not willing today      Patient Instructions     Start taking your famotidine 30 minutes before dinner    Drop off stool sample next week - looking for bacteria (H. Pylori), after your appt with surgeon Dr. Luis Marin    Keep checking blood pressure 3x a week      Follow-up: Return in about 3 months (around 2/19/2022) for Routine preventive & COVID booster.    The patient was seen by, and discussed with, Dr. Alina Cyr who agrees with the plan.  -----  Neli Duncan MD  PGY-3  [facility]       SUBJECTIVE       Stephanie Garcia is a 46 year old  female who presents to clinic today for the following :  Chief Complaint   Patient presents with     Other     blood pressure     Still having acid reflux.  Taking famotidine at night - 40 mg at bedtime for the past 2 weeks.    BP at home usually 110s/70s - checking  3-4x a week- today still high in office.  Still taking hydrochlorothiazide daily 12.5 mg and has been on it for a year.    Review  "of Systems   Constitutional: Negative for chills and fever.   Respiratory: Negative for chest tightness and shortness of breath.    Gastrointestinal: Negative for abdominal pain.        Acid reflux, burning chest pain            OBJECTIVE   /85 (BP Location: Left arm, Patient Position: Sitting, Cuff Size: Adult Regular)   Pulse 88   Temp 98.7  F (37.1  C) (Oral)   Resp 20   Ht 1.542 m (5' 0.7\")   Wt 58.5 kg (129 lb)   SpO2 100%   BMI 24.62 kg/m       Physical Exam  Constitutional:       Appearance: Normal appearance.   HENT:      Mouth/Throat:      Mouth: Mucous membranes are moist.      Pharynx: Oropharynx is clear. No oropharyngeal exudate or posterior oropharyngeal erythema.   Cardiovascular:      Rate and Rhythm: Normal rate and regular rhythm.      Pulses: Normal pulses.      Heart sounds: Normal heart sounds.   Abdominal:      General: Abdomen is flat. Bowel sounds are normal. There is no distension.      Palpations: Abdomen is soft.      Tenderness: There is no abdominal tenderness.   Neurological:      Mental Status: She is alert.           " Propranolol Counseling:  I discussed with the patient the risks of propranolol including but not limited to low heart rate, low blood pressure, low blood sugar, restlessness and increased cold sensitivity. They should call the office if they experience any of these side effects.

## 2023-07-07 ENCOUNTER — OFFICE VISIT (OUTPATIENT)
Dept: FAMILY MEDICINE | Facility: CLINIC | Age: 48
End: 2023-07-07
Payer: COMMERCIAL

## 2023-07-07 VITALS
SYSTOLIC BLOOD PRESSURE: 113 MMHG | TEMPERATURE: 97.5 F | DIASTOLIC BLOOD PRESSURE: 71 MMHG | BODY MASS INDEX: 24.13 KG/M2 | WEIGHT: 124.8 LBS | HEART RATE: 76 BPM | OXYGEN SATURATION: 99 %

## 2023-07-07 DIAGNOSIS — R21 RASH: ICD-10-CM

## 2023-07-07 DIAGNOSIS — B86 SCABIES: Primary | ICD-10-CM

## 2023-07-07 PROCEDURE — 99214 OFFICE O/P EST MOD 30 MIN: CPT | Mod: GC

## 2023-07-07 RX ORDER — DIPHENHYDRAMINE HCL 25 MG
25 TABLET ORAL EVERY 6 HOURS PRN
Qty: 30 TABLET | Refills: 1 | Status: SHIPPED | OUTPATIENT
Start: 2023-07-07 | End: 2023-11-06

## 2023-07-07 RX ORDER — PERMETHRIN 50 MG/G
CREAM TOPICAL
Qty: 60 G | Refills: 3 | Status: SHIPPED | OUTPATIENT
Start: 2023-07-07 | End: 2023-11-06

## 2023-07-07 RX ORDER — TRIAMCINOLONE ACETONIDE 1 MG/G
OINTMENT TOPICAL 2 TIMES DAILY
Qty: 30 G | Refills: 1 | Status: SHIPPED | OUTPATIENT
Start: 2023-07-07 | End: 2023-11-06

## 2023-07-07 NOTE — PROGRESS NOTES
Assessment & Plan     Rash  Scabies  History and physical exam most consistent with scabies.  Her daughter and son seem to also both be affected, her partner is not but will still be treated prophylactically as they share a bed.  Sent enough medication to treat the entire family.  Provided extensive directions on permethrin application.  Advised patient to repeat regimen once if problem does not resolve.  If symptoms continue after second treatment, recommend returning to clinic for reassessment.  - permethrin (ELIMITE) 5 % external cream  Dispense: 60 g; Refill: 3  - diphenhydrAMINE (BENADRYL) 25 MG tablet  Dispense: 30 tablet; Refill: 1  - triamcinolone (KENALOG) 0.1 % external ointment  Dispense: 30 g; Refill: 1      35 minutes spent by me on the date of the encounter doing chart review, history and exam, documentation and further activities per the note      Return if symptoms worsen or fail to improve.    Nargis Mosher [facility] ANGELO Brown is a 47 year old, presenting for the following health issues:  Rash (Developed rash on chest, both armpit and pelvic area since Sunday. Dry and itchy. Family at home also have rash )        7/7/2023     3:23 PM   Additional Questions   Roomed by faith   Accompanied by self     HPI     She states she first noticed the rash on her right shoulder Sunday while she was working.  She works at [employer] and they were cleaning at the time but patient was using appropriate protective equipment and no chemicals.  The rash was itchy and red and eventually started to pop up in other places. It has progressively worsened in the days since and continue to be quite itchy throughout the day.  Her daughter has similar spots and symptoms- she also first noticed them on Sunday while at work (also works at [employer]).  Patient's partner who she shares a bed with has not had any symptoms or spots pop up.  However, her 5-year-old son developed his first  spot today. Patient states she became worried yesterday that they be bedbugs and deeply cleaned her home and close.    He has had similar rash and symptoms in August 2021 when she was presumptively diagnosed with scabies and treated with permethrin, Kenalog, and Benadryl.  She states this treatment regimen worked well for her and her daughter who are the only ones affected.  Only required 1 round of permethrin.       Review of Systems   Constitutional, HEENT, cardiovascular, pulmonary, gi and gu systems are negative, except as otherwise noted.      Objective    /71 (BP Location: Left arm, Patient Position: Sitting, Cuff Size: Adult Regular)   Pulse 76   Temp 97.5  F (36.4  C) (Tympanic)   Wt 56.6 kg (124 lb 12.8 oz)   LMP 06/30/2023   SpO2 99%   BMI 24.13 kg/m    Body mass index is 24.13 kg/m .  Physical Exam   GENERAL: healthy, alert and no distress  EYES: Eyes grossly normal to inspection, PERRL and conjunctivae and sclerae normal  MS: no gross musculoskeletal defects noted, no edema  SKIN: Multiple small erythematous papules with excoriation throughout trunk  NEURO: Normal strength and tone, mentation intact and speech normal  PSYCH: mentation appears normal, affect normal/bright      ----- Service Performed and Documented by Resident or Fellow ------

## 2023-07-07 NOTE — PROGRESS NOTES
Preceptor Attestation:    I discussed the patient with the resident and evaluated the patient in person. I have verified the content of the note, which accurately reflects my assessment of the patient and the plan of care.   Supervising Physician:  Alfredo Ortega MD.

## 2023-07-07 NOTE — PATIENT INSTRUCTIONS
PERMETHRIN USE    Use the cream on your body when your skin is cool and dry. Don t use it after a hot shower or bath.  Follow your healthcare provider's instructions for applying the medicine. Usually the cream is put on your whole body. This means from your chin all the way down to your toes. Scabies doesn't usually affect an adult s head. So cream is not needed there. For children, discuss how to apply the medicine with your child s provider.  Leave the cream or lotion on for the recommended amount of time. This is usually 8 to 12 hours.  Don t leave cream or lotion on your skin longer than directed. Don t use more than recommended.  Wear clean clothes after the treatment.

## 2023-10-06 ENCOUNTER — ALLIED HEALTH/NURSE VISIT (OUTPATIENT)
Dept: FAMILY MEDICINE | Facility: CLINIC | Age: 48
End: 2023-10-06
Payer: COMMERCIAL

## 2023-10-06 VITALS
DIASTOLIC BLOOD PRESSURE: 71 MMHG | WEIGHT: 124 LBS | HEART RATE: 72 BPM | SYSTOLIC BLOOD PRESSURE: 99 MMHG | BODY MASS INDEX: 23.98 KG/M2 | OXYGEN SATURATION: 98 % | TEMPERATURE: 98.3 F

## 2023-10-06 DIAGNOSIS — Z23 ENCOUNTER FOR IMMUNIZATION: Primary | ICD-10-CM

## 2023-10-06 PROCEDURE — 90480 ADMN SARSCOV2 VAC 1/ONLY CMP: CPT

## 2023-10-06 PROCEDURE — 91320 SARSCV2 VAC 30MCG TRS-SUC IM: CPT

## 2023-10-06 PROCEDURE — 90471 IMMUNIZATION ADMIN: CPT

## 2023-10-06 PROCEDURE — 90686 IIV4 VACC NO PRSV 0.5 ML IM: CPT

## 2023-11-06 ENCOUNTER — OFFICE VISIT (OUTPATIENT)
Dept: FAMILY MEDICINE | Facility: CLINIC | Age: 48
End: 2023-11-06
Payer: COMMERCIAL

## 2023-11-06 VITALS
TEMPERATURE: 98.1 F | OXYGEN SATURATION: 100 % | HEART RATE: 67 BPM | WEIGHT: 123.4 LBS | DIASTOLIC BLOOD PRESSURE: 61 MMHG | BODY MASS INDEX: 23.86 KG/M2 | SYSTOLIC BLOOD PRESSURE: 121 MMHG | RESPIRATION RATE: 22 BRPM

## 2023-11-06 DIAGNOSIS — R73.03 PREDIABETES: Primary | ICD-10-CM

## 2023-11-06 DIAGNOSIS — K21.9 GASTROESOPHAGEAL REFLUX DISEASE WITHOUT ESOPHAGITIS: Primary | ICD-10-CM

## 2023-11-06 DIAGNOSIS — R73.03 PREDIABETES: ICD-10-CM

## 2023-11-06 LAB — HBA1C MFR BLD: 5.3 % (ref 0–5.6)

## 2023-11-06 PROCEDURE — 99213 OFFICE O/P EST LOW 20 MIN: CPT | Mod: GC

## 2023-11-06 PROCEDURE — 36415 COLL VENOUS BLD VENIPUNCTURE: CPT

## 2023-11-06 PROCEDURE — 83036 HEMOGLOBIN GLYCOSYLATED A1C: CPT

## 2023-11-06 NOTE — PROGRESS NOTES
Assessment & Plan     1. Prediabetes  A1c previously 5.7, patient follow-up as highly motivated around health and preventing diabetes. Repeat A1c today normal at 5.3. Reviewed nutrition and exercise.   - Hemoglobin A1c    2. Gastroesophageal reflux disease without esophagitis  Reviewed management and symptoms.  Patient reports this is followed with GI, had an endoscopy 1 year ago and was told she had a hiatal hernia and surgery was recommended.  Discussed management options for further, risks and benefits of medication management.  Patient reports she is currently taking famotidine once a week though has ongoing nighttime symptoms.  -After discussing options patient plans to take famotidine more frequently prior to bedtime.  -We will plan for follow-up with GI to discuss surgical option      Return in about 1 year (around 11/6/2024).    Luci Romero MD  [facility]    Tristan Brown is a 48 year old, presenting for the following health issues:  Diabetes (Follow-up dm)      11/6/2023     9:42 AM   Additional Questions   Roomed by nuvia   Accompanied by self       HPI     Prediabetes: Has changed diet. Was eating white rice before, changed to brown rice. Less overall carb and starch. Sugar intake wanting to talk about.     Exercise is more challenging. 15 mins twice a week. Jogging inside.     GERD: Endoscopy was done before. Was told she has a hernia. Was recommended surgery due to ongoing symptoms.       Review of Systems   As above      Objective    /61   Pulse 67   Temp 98.1  F (36.7  C) (Oral)   Resp 22   Wt 56 kg (123 lb 6.4 oz)   SpO2 100%   BMI 23.86 kg/m    Body mass index is 23.86 kg/m .  Physical Exam   Physical Exam  Constitutional:       General: No acute distress.     Appearance: Normal appearance. Patient not ill-appearing or diaphoretic.   Eyes:      Extraocular Movements: Extraocular movements intact.      Conjunctiva/sclera: Conjunctivae normal.   Pulmonary:       Effort: Pulmonary effort is normal. No respiratory distress.   Neurological:      General: No focal deficit present.      Mental Status: Patient is alert and oriented to person, place, and time.      Comments: No gross deficits appreciated   Psychiatric:         Attention and Perception: Attention normal.         Mood and Affect: Mood normal.         Speech: Speech normal.         Behavior: Behavior normal.

## 2023-12-21 ENCOUNTER — OFFICE VISIT (OUTPATIENT)
Dept: FAMILY MEDICINE | Facility: CLINIC | Age: 48
End: 2023-12-21
Payer: COMMERCIAL

## 2023-12-21 VITALS
DIASTOLIC BLOOD PRESSURE: 78 MMHG | RESPIRATION RATE: 20 BRPM | TEMPERATURE: 98.2 F | BODY MASS INDEX: 23.41 KG/M2 | HEART RATE: 65 BPM | HEIGHT: 61 IN | WEIGHT: 124 LBS | SYSTOLIC BLOOD PRESSURE: 119 MMHG | OXYGEN SATURATION: 100 %

## 2023-12-21 DIAGNOSIS — R10.2 PELVIC PAIN IN FEMALE: Primary | ICD-10-CM

## 2023-12-21 DIAGNOSIS — N23: ICD-10-CM

## 2023-12-21 PROCEDURE — 90471 IMMUNIZATION ADMIN: CPT

## 2023-12-21 PROCEDURE — 99213 OFFICE O/P EST LOW 20 MIN: CPT | Mod: 25

## 2023-12-21 PROCEDURE — 90746 HEPB VACCINE 3 DOSE ADULT IM: CPT

## 2023-12-21 RX ORDER — IBUPROFEN 200 MG
200 TABLET ORAL EVERY 4 HOURS PRN
Qty: 90 TABLET | Refills: 1 | Status: SHIPPED | OUTPATIENT
Start: 2023-12-21

## 2023-12-21 NOTE — PATIENT INSTRUCTIONS
-If you feel the spasms again take ibuprofen     -Keep up with water intake   -Try to do less salt  -Try less animal protein  -Foods that have oxalate: beets, chocolate, berries, rhubarb, nuts and leafy greens  - If you are eating foods with oxalate in them, please also have that meal with calcium such as having yogurt, orange, any dairy products, salmon, cereal    If having severe pain can consider Urgent Care ([place])  [address] [facility], [place] [address]

## 2023-12-21 NOTE — PROGRESS NOTES
Prior to immunization administration, verified patients identity using patient s name and date of birth. Please see Immunization Activity for additional information.     Screening Questionnaire for Adult Immunization    Are you sick today?   No   Do you have allergies to medications, food, a vaccine component or latex?   No   Have you ever had a serious reaction after receiving a vaccination?   No   Do you have a long-term health problem with heart, lung, kidney, or metabolic disease (e.g., diabetes), asthma, a blood disorder, no spleen, complement component deficiency, a cochlear implant, or a spinal fluid leak?  Are you on long-term aspirin therapy?   No   Do you have cancer, leukemia, HIV/AIDS, or any other immune system problem?   No   Do you have a parent, brother, or sister with an immune system problem?   No   In the past 3 months, have you taken medications that affect  your immune system, such as prednisone, other steroids, or anticancer drugs; drugs for the treatment of rheumatoid arthritis, Crohn s disease, or psoriasis; or have you had radiation treatments?   No   Have you had a seizure, or a brain or other nervous system problem?   No   During the past year, have you received a transfusion of blood or blood    products, or been given immune (gamma) globulin or antiviral drug?   No   For women: Are you pregnant or is there a chance you could become       pregnant during the next month?   No   Have you received any vaccinations in the past 4 weeks?   No     Immunization questionnaire answers were all negative.      Patient instructed to remain in clinic for 15 minutes afterwards, and to report any adverse reactions.     Screening performed by Williams Montejo on 12/21/2023 at 2:40 PM.

## 2023-12-21 NOTE — PROGRESS NOTES
"  Assessment & Plan     1. Pelvic pain in female, resolved  Intermittent, brief, sharp pain in the left side upper pelvis now resolved.  No fevers, no dysuria, no change in urination.  Patient afebrile, VSS.  Exam normal.  Upon chart review patient did have calcium oxalate found in UA.  Possible very small stone that had passed causing spasm of ureter.  Does not otherwise sound like muscle spasm.  Did discuss that while not a diagnosis of kidney stone patient may benefit from kidney stone formation prevention if desired.  Education on this provided.  - ibuprofen (ADVIL/MOTRIN) 200 MG tablet; Take 1 tablet (200 mg) by mouth every 4 hours as needed for pain  Dispense: 90 tablet; Refill: 1  -Return if pain is worsening, fever, change in urination        No follow-ups on file.    Luci Romero MD  [facility] ANGELO Brown is a 48 year old, presenting for the following health issues:  Abdominal Pain (Stomach pain about wks now)      12/21/2023     1:50 PM   Additional Questions   Roomed by nuvia   Accompanied by self       HPI     2 weeks of abdominal pain. Lower right side, mild pain. Lasts for a few seconds at a time. Happened Dec 8th - Dec 9th, happened once two weeks before then.     No nausea, no fevers, no dysuria. No changes to urination. Getting menses regularly though this was not during period and feels different than period cramp. No change with positions. No constipation.       Review of Systems   As above      Objective    /78   Pulse 65   Temp 98.2  F (36.8  C) (Oral)   Resp 20   Ht 1.539 m (5' 0.6\")   Wt 56.2 kg (124 lb)   SpO2 100%   BMI 23.74 kg/m    Body mass index is 23.74 kg/m .  Physical Exam   GENERAL: healthy, alert and no distress  RESP: lungs clear to auscultation - no rales, rhonchi or wheezes  CV: regular rate and rhythm, normal S1 S2, no S3 or S4, no murmur, click or rub  ABDOMEN: soft, nontender, no hepatosplenomegaly, no masses and bowel sounds " normal  BACK: no CVA tenderness, no paralumbar tenderness  PSYCH: mentation appears normal, affect normal/bright

## 2023-12-29 NOTE — PROGRESS NOTES
Physician Attestation   I, John Ya MD, saw this patient and agree with the findings and plan of care as documented in the note.      Items personally reviewed/procedural attestation: vitals.    John Ya MD

## 2024-01-11 ENCOUNTER — OFFICE VISIT (OUTPATIENT)
Dept: SURGERY | Facility: CLINIC | Age: 49
End: 2024-01-11
Payer: COMMERCIAL

## 2024-01-11 DIAGNOSIS — K21.9 GASTROESOPHAGEAL REFLUX DISEASE WITHOUT ESOPHAGITIS: Primary | ICD-10-CM

## 2024-01-11 PROCEDURE — 99213 OFFICE O/P EST LOW 20 MIN: CPT | Performed by: SURGERY

## 2024-01-11 NOTE — LETTER
1/11/2024         RE: Stephanie Garcia  [address]  [place] [address]        Dear Colleague,    Thank you for referring your patient, Stephanie Garcia, to the [facility] [place]. Please see a copy of my visit note below.         HPI: Stephanie Garcia is here for follow up to discuss her reflux.  She continues to have regurgitation when laying flat and is on Pepcid 20 mg a day which has not really working for her.  She is considering surgery as an option.    Allergies, Medications, Social History, Past Medical History and Past Surgical History were reviewed and are noted in the chart.    There were no vitals taken for this visit.  There is no height or weight on file to calculate BMI.      EXAM:   GENERAL: Appears well           Assessment/Plan: Stephanie Garcia continues to have reflux despite her low-dose medications.  We discussed increasing her dosage or changing medications completely in conjunction with dietary and lifestyle management strategies.  She has been doing this for some time and is not very much interested in continuing to take medications.  Having said that we also discussed surgical options including partial fundoplication versus magnetic sphincter augmentation.  We went over the risks and benefits of each and the pros and cons of each approach.  She would like to consider her options and will call back into the clinic when she is ready to schedule surgery or discuss the next steps moving forward.      Yosef Marin DO, FACS  [facility]  [phone]      Again, thank you for allowing me to participate in the care of your patient.        Sincerely,        Luis Marin DO

## 2024-01-11 NOTE — PROGRESS NOTES
HPI: Stephanie Garcia is here for follow up to discuss her reflux.  She continues to have regurgitation when laying flat and is on Pepcid 20 mg a day which has not really working for her.  She is considering surgery as an option.    Allergies, Medications, Social History, Past Medical History and Past Surgical History were reviewed and are noted in the chart.    There were no vitals taken for this visit.  There is no height or weight on file to calculate BMI.      EXAM:   GENERAL: Appears well           Assessment/Plan: Stephanie Garcia continues to have reflux despite her low-dose medications.  We discussed increasing her dosage or changing medications completely in conjunction with dietary and lifestyle management strategies.  She has been doing this for some time and is not very much interested in continuing to take medications.  Having said that we also discussed surgical options including partial fundoplication versus magnetic sphincter augmentation.  We went over the risks and benefits of each and the pros and cons of each approach.  She would like to consider her options and will call back into the clinic when she is ready to schedule surgery or discuss the next steps moving forward.      Yosef Marin DO [facility]  [phone]

## 2024-02-02 DIAGNOSIS — I10 ESSENTIAL HYPERTENSION: ICD-10-CM

## 2024-02-02 RX ORDER — HYDROCHLOROTHIAZIDE 12.5 MG/1
12.5 TABLET ORAL DAILY
Qty: 90 TABLET | Refills: 3 | Status: SHIPPED | OUTPATIENT
Start: 2024-02-02

## 2024-02-02 NOTE — TELEPHONE ENCOUNTER
Medication requested: HYDROCHLOROTHIAZIDE 12.5 MG TB   Last office visit: 12/21/23  Future [facility] appointments: 2/12/24  Medication last refilled: 11/6/23  Last qualifying labs: BP: 119/78  as of 12/21/2023     Prescription approved per [facility] Refill Protocol.    Diuretics (Including Combos) Protocol [id number]/02/2024 12:35 AM   Protocol Details Blood pressure under 140/90 in past 12 months    Recent (12 mo) or future (30 days) visit within the authorizing provider's specialty    Medication is active on med list    Medication indicated for associated diagnosis    Has GFR on file in past 12 months and most recent value is normal    Patient is age 18 or older    No active pregancy on record    No positive pregnancy test in past 12 months        Ezio RN, BSN

## 2024-05-02 SDOH — HEALTH STABILITY: PHYSICAL HEALTH: ON AVERAGE, HOW MANY MINUTES DO YOU ENGAGE IN EXERCISE AT THIS LEVEL?: 20 MIN

## 2024-05-02 SDOH — HEALTH STABILITY: PHYSICAL HEALTH: ON AVERAGE, HOW MANY DAYS PER WEEK DO YOU ENGAGE IN MODERATE TO STRENUOUS EXERCISE (LIKE A BRISK WALK)?: 3 DAYS

## 2024-05-02 ASSESSMENT — SOCIAL DETERMINANTS OF HEALTH (SDOH): HOW OFTEN DO YOU GET TOGETHER WITH FRIENDS OR RELATIVES?: ONCE A WEEK

## 2024-05-02 NOTE — COMMUNITY RESOURCES LIST (ENGLISH)
May 2, 2024           YOUR PERSONALIZED LIST OF SERVICES & PROGRAMS               Bill Payment Assistance      [facility] - Utility payment assistance  [address] [place] [address] (Distance: 3.0 miles)  Phone: [phone]  Language: English, Vincentian, Stateless, Hmong  Fee: Free  Accessibility: Ada accessible, Translation services      [employer]) [facility] - Energy Assistance  [address] [place] [address] (Distance: 1.3 miles)  Phone: [phone]  Language: English, Vincentian, Hmong, Stateless  Fee: Free  Accessibility: Translation services      - Dislocated Worker/Adult [employer] Employment Program  Phone: [phone]  Email: herrera@[email or web address]  Website: https://TelePharm/services/employment-services/dislocated-worker-program/  Language: English, Stateless  Hours: Mon 8:00 AM - 4:30 PM Tue 8:00 AM - 4:30 PM Wed 8:00 AM - 4:30 PM Thu 8:00 AM - 4:30 PM Fri 8:00 AM - 4:30 PM  Fee: Free  Accessibility: Ada accessible               IMPORTANT NUMBERS & WEBSITES        Emergency Services  911  .   United Way  211 http://211unitedway.org  .   Poison Control  (161) 860-2911 http://[place]poison.org http://wisconsinpoison.org  .     Suicide and Crisis Lifeline  988 http://988lifeline.org  .   Childhelp National Child Abuse Hotline  702.338.4170 http://Childhelphotline.org   .   National Sexual Assault Hotline  (922) 180-1268 (HOPE) http://Rainn.org   .     National Runaway Safeline  (636) 128-8114 (RUNAWAY) http://1800runaway.org  .   Pregnancy & Postpartum Support  Call/text 870-637-8455  [place]: http://ppsupportmn.org  [place]: http://CorMatrix.com/wi  .   Substance Abuse National Helpline ([facility])  099-883-HELP (7666) http://Findtreatment.gov   .                DISCLAIMER: These resources have been generated via the GoodClic Platform. [employer] does not endorse any service providers mentioned in this resource list. [employer] does not guarantee that the services  mentioned in this resource list will be available to you or will improve your health or wellness.    [facility]

## 2024-05-06 ENCOUNTER — OFFICE VISIT (OUTPATIENT)
Dept: FAMILY MEDICINE | Facility: CLINIC | Age: 49
End: 2024-05-06
Payer: COMMERCIAL

## 2024-05-06 ENCOUNTER — ORDERS ONLY (AUTO-RELEASED) (OUTPATIENT)
Dept: FAMILY MEDICINE | Facility: CLINIC | Age: 49
End: 2024-05-06

## 2024-05-06 VITALS
TEMPERATURE: 98.1 F | RESPIRATION RATE: 20 BRPM | OXYGEN SATURATION: 100 % | HEART RATE: 69 BPM | SYSTOLIC BLOOD PRESSURE: 121 MMHG | HEIGHT: 61 IN | BODY MASS INDEX: 23.18 KG/M2 | WEIGHT: 122.8 LBS | DIASTOLIC BLOOD PRESSURE: 75 MMHG

## 2024-05-06 DIAGNOSIS — Z12.11 SCREEN FOR COLON CANCER: ICD-10-CM

## 2024-05-06 DIAGNOSIS — Z00.00 ROUTINE GENERAL MEDICAL EXAMINATION AT A HEALTH CARE FACILITY: Primary | ICD-10-CM

## 2024-05-06 LAB
CHOLEST SERPL-MCNC: 156 MG/DL
FASTING STATUS PATIENT QL REPORTED: NORMAL
HBA1C MFR BLD: 5.2 % (ref 0–5.6)
HDLC SERPL-MCNC: 55 MG/DL
LDLC SERPL CALC-MCNC: 79 MG/DL
NONHDLC SERPL-MCNC: 101 MG/DL
TRIGL SERPL-MCNC: 111 MG/DL

## 2024-05-06 PROCEDURE — 80061 LIPID PANEL: CPT

## 2024-05-06 PROCEDURE — 90471 IMMUNIZATION ADMIN: CPT

## 2024-05-06 PROCEDURE — 36415 COLL VENOUS BLD VENIPUNCTURE: CPT

## 2024-05-06 PROCEDURE — 99396 PREV VISIT EST AGE 40-64: CPT | Mod: 25

## 2024-05-06 PROCEDURE — 83036 HEMOGLOBIN GLYCOSYLATED A1C: CPT

## 2024-05-06 PROCEDURE — 90746 HEPB VACCINE 3 DOSE ADULT IM: CPT

## 2024-05-06 NOTE — PATIENT INSTRUCTIONS
Possibly effective, would likely be safe to try but let me know if you are using any of these consistently.      Acupuncture Dyspepsia   Artichoke Dyspepsia   Jerusalem Dyspepsia   Clown's Mustard plant Dyspepsia   Greater Celandine Dyspepsia    Gooseberry Gastroesophageal reflux disease (GERD)   Narvon Dyspepsia   Peppermint Dyspepsia   Quince Gastroesophageal reflux disease (GERD)   Turmeric      Preventive Care Advice   This is general advice given by our system to help you stay healthy. However, your care team may have specific advice just for you. Please talk to your care team about your preventive care needs.  Nutrition  Eat 5 or more servings of fruits and vegetables each day.  Try wheat bread, brown rice and whole grain pasta (instead of white bread, rice, and pasta).  Get enough calcium and vitamin D. Check the label on foods and aim for 100% of the RDA (recommended daily allowance).  Lifestyle  Exercise at least 150 minutes each week   (30 minutes a day, 5 days a week).  Do muscle strengthening activities 2 days a week. These help control your weight and prevent disease.  No smoking.  Wear sunscreen to prevent skin cancer.  Have a dental exam and cleaning every 6 months.  Yearly exams  See your health care team every year to talk about:  Any changes in your health.  Any medicines your care team has prescribed.  Preventive care, family planning, and ways to prevent chronic diseases.  Shots (vaccines)   HPV shots (up to age 26), if you've never had them before.  Hepatitis B shots (up to age 59), if you've never had them before.  COVID-19 shot: Get this shot when it's due.  Flu shot: Get a flu shot every year.  Tetanus shot: Get a tetanus shot every 10 years.  Pneumococcal, hepatitis A, and RSV shots: Ask your care team if you need these based on your risk.  Shingles shot (for age 50 and up).  General health tests  Diabetes screening:  Starting at age 35, Get screened for diabetes at least every 3  years.  If you are younger than age 35, ask your care team if you should be screened for diabetes.  Cholesterol test: At age 39, start having a cholesterol test every 5 years, or more often if advised.  Bone density scan (DEXA): At age 50, ask your care team if you should have this scan for osteoporosis (brittle bones).  Hepatitis C: Get tested at least once in your life.  STIs (sexually transmitted infections)  Before age 24: Ask your care team if you should be screened for STIs.  After age 24: Get screened for STIs if you're at risk. You are at risk for STIs (including HIV) if:  You are sexually active with more than one person.  You don't use condoms every time.  You or a partner was diagnosed with a sexually transmitted infection.  If you are at risk for HIV, ask about PrEP medicine to prevent HIV.  Get tested for HIV at least once in your life, whether you are at risk for HIV or not.  Cancer screening tests  Cervical cancer screening: If you have a cervix, begin getting regular cervical cancer screening tests at age 21. Most people who have regular screenings with normal results can stop after age 65. Talk about this with your provider.  Breast cancer scan (mammogram): If you've ever had breasts, begin having regular mammograms starting at age 40. This is a scan to check for breast cancer.  Colon cancer screening: It is important to start screening for colon cancer at age 45.  Have a colonoscopy test every 10 years (or more often if you're at risk) Or, ask your provider about stool tests like a FIT test every year or Cologuard test every 3 years.  To learn more about your testing options, visit: https://www.TechLoaner/649526.pdf.  For help making a decision, visit: https://bit.ly/qf17089.  Prostate cancer screening test: If you have a prostate and are age 55 to 69, ask your provider if you would benefit from a yearly prostate cancer screening test.  Lung cancer screening: If you are a current or former smoker  age 50 to 80, ask your care team if ongoing lung cancer screenings are right for you.  For informational purposes only. Not to replace the advice of your health care provider. Copyright   2023 [facility]. All rights reserved. Clinically reviewed by the [facility] Transitions Program. Appinions 368061 - REV 01/24.    Learning About Stress  What is stress?     Stress is your body's response to a hard situation. Your body can have a physical, emotional, or mental response. Stress is a fact of life for most people, and it affects everyone differently. What causes stress for you may not be stressful for someone else.  A lot of things can cause stress. You may feel stress when you go on a job interview, take a test, or run a race. This kind of short-term stress is normal and even useful. It can help you if you need to work hard or react quickly. For example, stress can help you finish an important job on time.  Long-term stress is caused by ongoing stressful situations or events. Examples of long-term stress include long-term health problems, ongoing problems at work, or conflicts in your family. Long-term stress can harm your health.  How does stress affect your health?  When you are stressed, your body responds as though you are in danger. It makes hormones that speed up your heart, make you breathe faster, and give you a burst of energy. This is called the fight-or-flight stress response. If the stress is over quickly, your body goes back to normal and no harm is done.  But if stress happens too often or lasts too long, it can have bad effects. Long-term stress can make you more likely to get sick, and it can make symptoms of some diseases worse. If you tense up when you are stressed, you may develop neck, shoulder, or low back pain. Stress is linked to high blood pressure and heart disease.  Stress also harms your emotional health. It can make you hickman, tense, or depressed. Your relationships  may suffer, and you may not do well at work or school.  What can you do to manage stress?  You can try these things to help manage stress:   Do something active. Exercise or activity can help reduce stress. Walking is a great way to get started. Even everyday activities such as housecleaning or yard work can help.  Try yoga or wallace chi. These techniques combine exercise and meditation. You may need some training at first to learn them.  Do something you enjoy. For example, listen to music or go to a movie. Practice your hobby or do volunteer work.  Meditate. This can help you relax, because you are not worrying about what happened before or what may happen in the future.  Do guided imagery. Imagine yourself in any setting that helps you feel calm. You can use online videos, books, or a teacher to guide you.  Do breathing exercises. For example:  From a standing position, bend forward from the waist with your knees slightly bent. Let your arms dangle close to the floor.  Breathe in slowly and deeply as you return to a standing position. Roll up slowly and lift your head last.  Hold your breath for just a few seconds in the standing position.  Breathe out slowly and bend forward from the waist.  Let your feelings out. Talk, laugh, cry, and express anger when you need to. Talking with supportive friends or family, a counselor, or a aleta leader about your feelings is a healthy way to relieve stress. Avoid discussing your feelings with people who make you feel worse.  Write. It may help to write about things that are bothering you. This helps you find out how much stress you feel and what is causing it. When you know this, you can find better ways to cope.  What can you do to prevent stress?  You might try some of these things to help prevent stress:  Manage your time. This helps you find time to do the things you want and need to do.  Get enough sleep. Your body recovers from the stresses of the day while you are  "sleeping.  Get support. Your family, friends, and community can make a difference in how you experience stress.  Limit your news feed. Avoid or limit time on social media or news that may make you feel stressed.  Do something active. Exercise or activity can help reduce stress. Walking is a great way to get started.  Where can you learn more?  Go to https://www.Mobibeam.net/patiented  Enter N032 in the search box to learn more about \"Learning About Stress.\"  Current as of: October 24, 2023               Content Version: 14.0    4047-7647 [employer].   Care instructions adapted under license by your healthcare professional. If you have questions about a medical condition or this instruction, always ask your healthcare professional. [employer] disclaims any warranty or liability for your use of this information.      "

## 2024-05-06 NOTE — PROGRESS NOTES
Prior to immunization administration, verified patients identity using patient s name and date of birth. Please see Immunization Activity for additional information.     Screening Questionnaire for Adult Immunization    Are you sick today?   No   Do you have allergies to medications, food, a vaccine component or latex?   No   Have you ever had a serious reaction after receiving a vaccination?   No   Do you have a long-term health problem with heart, lung, kidney, or metabolic disease (e.g., diabetes), asthma, a blood disorder, no spleen, complement component deficiency, a cochlear implant, or a spinal fluid leak?  Are you on long-term aspirin therapy?   No   Do you have cancer, leukemia, HIV/AIDS, or any other immune system problem?   No   Do you have a parent, brother, or sister with an immune system problem?   No   In the past 3 months, have you taken medications that affect  your immune system, such as prednisone, other steroids, or anticancer drugs; drugs for the treatment of rheumatoid arthritis, Crohn s disease, or psoriasis; or have you had radiation treatments?   No   Have you had a seizure, or a brain or other nervous system problem?   No   During the past year, have you received a transfusion of blood or blood    products, or been given immune (gamma) globulin or antiviral drug?   No   For women: Are you pregnant or is there a chance you could become       pregnant during the next month?   No   Have you received any vaccinations in the past 4 weeks?   No     Immunization questionnaire answers were all negative.      Patient instructed to remain in clinic for 15 minutes afterwards, and to report any adverse reactions.     Screening performed by Williams Montejo on 5/6/2024 at 11:17 AM.

## 2024-05-06 NOTE — PROGRESS NOTES
"Preceptor Attestation:  Vitals:    05/06/24 1005   BP: 121/75   Pulse: 69   Resp: 20   Temp: 98.1  F (36.7  C)   TempSrc: Oral   SpO2: 100%   Weight: 55.7 kg (122 lb 12.8 oz)   Height: 1.542 m (5' 0.7\")          I discussed the patient with the resident and evaluated the patient in person. I have verified the content of the note, which accurately reflects my assessment of the patient and the plan of care.   Supervising Physician:  Landy Chi MD    "

## 2024-05-06 NOTE — PROGRESS NOTES
Preventive Care Visit  [facility]  Luci Romero MD, Family Medicine  May 6, 2024      Assessment & Plan     1. Screen for colon cancer  Not high risk, discussed options  - COLOGUARD(EXACT SCIENCES); Future    2. Routine general medical examination at a health care facility  Periods are regular, did discuss expectations approaching possible perimenopause. Pt occasionally sexually active, not using BC, does not want to get pregnant. Discussed risk of pregnancy and options including emergency contraceptive, declines at this time.   - Hemoglobin A1c  - Lipid Profile      Counseling  Appropriate preventive services were discussed with this patient, including applicable screening as appropriate for fall prevention, nutrition, physical activity, Tobacco-use cessation, weight loss and cognition.  Checklist reviewing preventive services available has been given to the patient.  Reviewed patient's diet, addressing concerns and/or questions.   She is at risk for lack of exercise and has been provided with information to increase physical activity for the benefit of her well-being.   She is at risk for psychosocial distress and has been provided with information to reduce risk.       Return in about 53 weeks (around 5/12/2025) for Annual Wellness Visit.    Tristan Brown is a 48 year old, presenting for the following:  Physical (Yearly check up)        5/6/2024    10:06 AM   Additional Questions   Roomed by nuvia   Accompanied by self         5/6/2024    Information    services provided? No        HPI    Doing well overall        5/2/2024   General Health   How would you rate your overall physical health? Good   Feel stress (tense, anxious, or unable to sleep) Only a little   (!) STRESS CONCERN      5/2/2024   Nutrition   Three or more servings of calcium each day? Yes   Diet: Low salt   How many servings of fruit and vegetables per day? (!) 2-3   How many sweetened beverages  each day? 0-1         5/2/2024   Exercise   Days per week of moderate/strenous exercise 3 days   Average minutes spent exercising at this level 20 min         5/2/2024   Social Factors   Frequency of gathering with friends or relatives Once a week   Worry food won't last until get money to buy more No   Food not last or not have enough money for food? No   Do you have housing?  Yes   Are you worried about losing your housing? No   Lack of transportation? No   Unable to get utilities (heat,electricity)? Yes   Want help with housing or utility concern? No   (!) FINANCIAL RESOURCE STRAIN CONCERN      5/2/2024   Dental   Dentist two times every year? Yes         5/2/2024   TB Screening   Were you born outside of the [place]? Yes         Today's PHQ-2 Score:       5/6/2024    10:03 AM   PHQ-2 ( 1999 Pfizer)   Q1: Little interest or pleasure in doing things 0   Q2: Feeling down, depressed or hopeless 0   PHQ-2 Score 0           5/2/2024   Substance Use   Alcohol more than 3/day or more than 7/wk Not Applicable   Do you use any other substances recreationally? No     Social History     Tobacco Use    Smoking status: Never    Smokeless tobacco: Never    Tobacco comments:      smokes   Substance Use Topics    Alcohol use: No     Alcohol/week: 0.0 standard drinks of alcohol    Drug use: No           5/18/2023   LAST FHS-7 RESULTS   1st degree relative breast or ovarian cancer No   Any relative bilateral breast cancer No   Any male have breast cancer No   Any ONE woman have BOTH breast AND ovarian cancer No   Any woman with breast cancer before 50yrs No   2 or more relatives with breast AND/OR ovarian cancer No   2 or more relatives with breast AND/OR bowel cancer No          Did mammogram last year.         5/2/2024   STI Screening   New sexual partner(s) since last STI/HIV test? No     History of abnormal Pap smear: Was normal, every 5 years.        Latest Ref Rng & Units 4/17/2023    10:29 AM   PAP / HPV   PAP  Negative  "for Intraepithelial Lesion or Malignancy (NILM)    HPV 16 DNA Negative Negative    HPV 18 DNA Negative Negative    Other HR HPV Negative Negative      ASCVD Risk   The 10-year ASCVD risk score (Manuel VAZQUEZ, et al., 2019) is: 1.3%    Values used to calculate the score:      Age: 48 years      Sex: Female      Is Non- : No      Diabetic: No      Tobacco smoker: No      Systolic Blood Pressure: 121 mmHg      Is BP treated: Yes      HDL Cholesterol: 41 mg/dL      Total Cholesterol: 173 mg/dL        5/2/2024   Contraception/Family Planning   Questions about contraception or family planning No        Reviewed and updated as needed this visit by Provider                    Regular period. Infrequently sexually active. Not wanting to be pregnant, no birth control and doesn't want any.        Objective    Exam  /75   Pulse 69   Temp 98.1  F (36.7  C) (Oral)   Resp 20   Ht 1.542 m (5' 0.7\")   Wt 55.7 kg (122 lb 12.8 oz)   SpO2 100%   BMI 23.43 kg/m     Estimated body mass index is 23.43 kg/m  as calculated from the following:    Height as of this encounter: 1.542 m (5' 0.7\").    Weight as of this encounter: 55.7 kg (122 lb 12.8 oz).    Physical Exam  GENERAL: alert and no distress  EYES: Eyes grossly normal to inspection, PERRL and conjunctivae and sclerae normal  HENT: ear canals and TM's normal, nose and mouth without ulcers or lesions  NECK: no adenopathy, no asymmetry, masses, or scars  RESP: lungs clear to auscultation - no rales, rhonchi or wheezes  CV: regular rate and rhythm, normal S1 S2, no S3 or S4, no murmur, click or rub, no peripheral edema  ABDOMEN: soft, nontender, no hepatosplenomegaly, no masses and bowel sounds normal  MS: no gross musculoskeletal defects noted, no edema  SKIN: no suspicious lesions or rashes  NEURO: Normal strength and tone, mentation intact and speech normal  PSYCH: mentation appears normal, affect normal/bright        Signed Electronically by: " Luci Romero MD

## 2024-05-22 LAB — NONINV COLON CA DNA+OCC BLD SCRN STL QL: NEGATIVE

## 2024-05-30 ENCOUNTER — ANCILLARY PROCEDURE (OUTPATIENT)
Dept: MAMMOGRAPHY | Facility: CLINIC | Age: 49
End: 2024-05-30
Payer: COMMERCIAL

## 2024-05-30 DIAGNOSIS — Z12.31 VISIT FOR SCREENING MAMMOGRAM: ICD-10-CM

## 2024-05-30 PROCEDURE — 77067 SCR MAMMO BI INCL CAD: CPT | Mod: TC | Performed by: STUDENT IN AN ORGANIZED HEALTH CARE EDUCATION/TRAINING PROGRAM

## 2024-05-30 PROCEDURE — 77063 BREAST TOMOSYNTHESIS BI: CPT | Mod: TC | Performed by: STUDENT IN AN ORGANIZED HEALTH CARE EDUCATION/TRAINING PROGRAM

## 2024-08-29 ENCOUNTER — TRANSFERRED RECORDS (OUTPATIENT)
Dept: HEALTH INFORMATION MANAGEMENT | Facility: CLINIC | Age: 49
End: 2024-08-29

## 2024-09-16 ENCOUNTER — MEDICAL CORRESPONDENCE (OUTPATIENT)
Dept: HEALTH INFORMATION MANAGEMENT | Facility: CLINIC | Age: 49
End: 2024-09-16
Payer: COMMERCIAL

## 2024-09-17 ENCOUNTER — TRANSCRIBE ORDERS (OUTPATIENT)
Dept: OTHER | Age: 49
End: 2024-09-17

## 2024-09-17 DIAGNOSIS — K44.9 HIATAL HERNIA: Primary | ICD-10-CM

## 2025-02-05 ENCOUNTER — OFFICE VISIT (OUTPATIENT)
Dept: FAMILY MEDICINE | Facility: CLINIC | Age: 50
End: 2025-02-05
Payer: COMMERCIAL

## 2025-02-05 VITALS
HEIGHT: 60 IN | SYSTOLIC BLOOD PRESSURE: 124 MMHG | RESPIRATION RATE: 20 BRPM | TEMPERATURE: 98.1 F | WEIGHT: 122.8 LBS | OXYGEN SATURATION: 100 % | HEART RATE: 71 BPM | DIASTOLIC BLOOD PRESSURE: 77 MMHG | BODY MASS INDEX: 24.11 KG/M2

## 2025-02-05 DIAGNOSIS — R20.0 NUMBNESS AND TINGLING IN BOTH HANDS: Primary | ICD-10-CM

## 2025-02-05 DIAGNOSIS — R20.2 NUMBNESS AND TINGLING IN BOTH HANDS: Primary | ICD-10-CM

## 2025-02-05 LAB
ANION GAP SERPL CALCULATED.3IONS-SCNC: 10 MMOL/L (ref 7–15)
BUN SERPL-MCNC: 20.4 MG/DL (ref 6–20)
CALCIUM SERPL-MCNC: 9.2 MG/DL (ref 8.8–10.4)
CHLORIDE SERPL-SCNC: 102 MMOL/L (ref 98–107)
CREAT SERPL-MCNC: 0.65 MG/DL (ref 0.51–0.95)
EGFRCR SERPLBLD CKD-EPI 2021: >90 ML/MIN/1.73M2
FOLATE SERPL-MCNC: 11.9 NG/ML (ref 4.6–34.8)
GLUCOSE SERPL-MCNC: 91 MG/DL (ref 70–99)
HCO3 SERPL-SCNC: 27 MMOL/L (ref 22–29)
POTASSIUM SERPL-SCNC: 4.1 MMOL/L (ref 3.4–5.3)
SODIUM SERPL-SCNC: 139 MMOL/L (ref 135–145)
VIT B12 SERPL-MCNC: 639 PG/ML (ref 232–1245)

## 2025-02-05 NOTE — PROGRESS NOTES
Preceptor Attestation:    I discussed the patient with the resident and evaluated the patient in person. I have verified the content of the note, which accurately reflects my assessment of the patient and the plan of care.   Supervising Physician:  Ulysses Ya DO.

## 2025-02-05 NOTE — PROGRESS NOTES
Assessment & Plan     Numbness and tingling in both hands, Right > Left   Numbness and tingling in both hands, right > left, that comes and goes but only at night. Initially seen for this condition in 2021, and pt does not recall specific treatment for condition. Episodes occur 2-3x per week. Pt reports stiffness in her fingers before the numbness starts. Initially involved only palmar surface of digits 1, 2, and 3 but now involves all digits and only the palm and not the dorsum of the hand. 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 No alcohol use and no family Hx of degenerative neurological conditions. Physical exam negative for Tinel and Phalen and joint swelling; sensation intact over entire surface of both hands; no atrophy and normal strength in both upper extremities. Given that pt primarily experiences these symptoms at night, discussed likelihood of compressing hands and arms for prolonged periods and provoking symptoms. Based on PMHx and normal physical exam, lower suspicion for degenerative neurological disorders and for carpal tunnel syndrome. Symptoms more consistent with possible R median and/or ulnar nerve entrapment or aggravation. Counseled on changing sleep positions and use of wrist brace for R arm since symptoms greater on that side. Will check some basic labs due to symptoms.   - Folate  - Vitamin B12  - BMP  - Wrist/Arm/Hand Bracing Supplies Order Wrist Brace; Right; non-thumb spica  - Occupational Therapy  Referral; Future    No follow-ups on file.    Subjective   Stephanie is a 49 year old, presenting for the following health issues:  Musculoskeletal Problem (Hand numbness - for years - on and off - started in 2021. )      2/5/2025     9:13 AM   Additional Questions   Roomed by LINDA odonnell MA   Accompanied by Self         2/5/2025    Information    services provided? No     HPI   Numbness and tingling in both hands intermittently since 2021 and now feels  like it is worsening. Pt described the symptoms initially confined to the first 3 digits of the R hand but now involves both hands and all digits. R-hand remains more symptomatic than the left. Pt reported symptoms occur overnight, waking her from sleep, about 2-3 times per week. Pt denied decreased strength in limbs and symptoms occurring during the day. 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 Pt concerned symptoms may be signs of something more significant. Pt denied a family history of neurodegenerative disorders, numbness and tingling elsewhere over body, difficulty walking, increased fatigue, and alcohol use. Pt reported symptoms only occur over the palms of her hands. No known injuries to hands and arms.       Review of Systems  Constitutional, HEENT, cardiovascular, pulmonary, gi and gu systems are negative, except as otherwise noted.      Objective    /77   Pulse 71   Temp 98.1  F (36.7  C) (Oral)   Resp 20   Ht 1.524 m (5')   Wt 55.7 kg (122 lb 12.8 oz)   LMP 01/22/2025 (Approximate)   SpO2 100%   BMI 23.98 kg/m    Body mass index is 23.98 kg/m .  Physical Exam   GENERAL: alert and no distress  RESP: lungs clear to auscultation - no rales, rhonchi or wheezes  CV: regular rate and rhythm, normal S1 S2, no S3 or S4, no murmur, click or rub, no peripheral edema  ABDOMEN: soft, nontender, no hepatosplenomegaly, no masses and bowel sounds normal  MS: no gross musculoskeletal defects noted, no edema in hands, no joint swelling in hands, elbows, and knees, normal strength in both hands and arms, negative Phalen and Tinel, normal skin appearance over arms and hands.   NEURO: Sensation intact in all limbs, including hands; no gross deficits; normal gait          This patient precepted with Dr. Ulysses Ya DO.     Signed Electronically by: SOMMER CARRILLO MD JD, PGY-2

## 2025-04-07 ENCOUNTER — PATIENT OUTREACH (OUTPATIENT)
Dept: CARE COORDINATION | Facility: CLINIC | Age: 50
End: 2025-04-07
Payer: COMMERCIAL

## 2025-05-10 SDOH — HEALTH STABILITY: PHYSICAL HEALTH: ON AVERAGE, HOW MANY MINUTES DO YOU ENGAGE IN EXERCISE AT THIS LEVEL?: 30 MIN

## 2025-05-10 SDOH — HEALTH STABILITY: PHYSICAL HEALTH: ON AVERAGE, HOW MANY DAYS PER WEEK DO YOU ENGAGE IN MODERATE TO STRENUOUS EXERCISE (LIKE A BRISK WALK)?: 3 DAYS

## 2025-05-10 ASSESSMENT — SOCIAL DETERMINANTS OF HEALTH (SDOH): HOW OFTEN DO YOU GET TOGETHER WITH FRIENDS OR RELATIVES?: ONCE A WEEK

## 2025-05-12 ENCOUNTER — RESULTS FOLLOW-UP (OUTPATIENT)
Dept: FAMILY MEDICINE | Facility: CLINIC | Age: 50
End: 2025-05-12

## 2025-05-12 ENCOUNTER — OFFICE VISIT (OUTPATIENT)
Dept: FAMILY MEDICINE | Facility: CLINIC | Age: 50
End: 2025-05-12
Payer: COMMERCIAL

## 2025-05-12 VITALS
SYSTOLIC BLOOD PRESSURE: 126 MMHG | OXYGEN SATURATION: 100 % | BODY MASS INDEX: 23.64 KG/M2 | HEIGHT: 60 IN | RESPIRATION RATE: 20 BRPM | TEMPERATURE: 97.9 F | WEIGHT: 120.4 LBS | DIASTOLIC BLOOD PRESSURE: 82 MMHG | HEART RATE: 75 BPM

## 2025-05-12 DIAGNOSIS — Z00.00 ROUTINE GENERAL MEDICAL EXAMINATION AT A HEALTH CARE FACILITY: Primary | ICD-10-CM

## 2025-05-12 DIAGNOSIS — K21.9 GASTROESOPHAGEAL REFLUX DISEASE WITHOUT ESOPHAGITIS: ICD-10-CM

## 2025-05-12 DIAGNOSIS — Z12.31 ENCOUNTER FOR SCREENING MAMMOGRAM FOR BREAST CANCER: ICD-10-CM

## 2025-05-12 DIAGNOSIS — I10 ESSENTIAL HYPERTENSION: ICD-10-CM

## 2025-05-12 DIAGNOSIS — R14.0 BLOATING: ICD-10-CM

## 2025-05-12 LAB
ANION GAP SERPL CALCULATED.3IONS-SCNC: 10 MMOL/L (ref 7–15)
BUN SERPL-MCNC: 17.7 MG/DL (ref 6–20)
CALCIUM SERPL-MCNC: 9.5 MG/DL (ref 8.8–10.4)
CHLORIDE SERPL-SCNC: 103 MMOL/L (ref 98–107)
CHOLEST SERPL-MCNC: 169 MG/DL
CREAT SERPL-MCNC: 0.63 MG/DL (ref 0.51–0.95)
EGFRCR SERPLBLD CKD-EPI 2021: >90 ML/MIN/1.73M2
EST. AVERAGE GLUCOSE BLD GHB EST-MCNC: 100 MG/DL
FASTING STATUS PATIENT QL REPORTED: NORMAL
FASTING STATUS PATIENT QL REPORTED: NORMAL
GLUCOSE SERPL-MCNC: 86 MG/DL (ref 70–99)
HBA1C MFR BLD: 5.1 % (ref 0–5.6)
HCO3 SERPL-SCNC: 26 MMOL/L (ref 22–29)
HDLC SERPL-MCNC: 66 MG/DL
LDLC SERPL CALC-MCNC: 79 MG/DL
NONHDLC SERPL-MCNC: 103 MG/DL
POTASSIUM SERPL-SCNC: 4.1 MMOL/L (ref 3.4–5.3)
SODIUM SERPL-SCNC: 139 MMOL/L (ref 135–145)
TRIGL SERPL-MCNC: 121 MG/DL

## 2025-05-12 PROCEDURE — 83036 HEMOGLOBIN GLYCOSYLATED A1C: CPT

## 2025-05-12 PROCEDURE — 80061 LIPID PANEL: CPT

## 2025-05-12 PROCEDURE — 3079F DIAST BP 80-89 MM HG: CPT

## 2025-05-12 PROCEDURE — 99396 PREV VISIT EST AGE 40-64: CPT | Mod: GC

## 2025-05-12 PROCEDURE — 36415 COLL VENOUS BLD VENIPUNCTURE: CPT

## 2025-05-12 PROCEDURE — 80048 BASIC METABOLIC PNL TOTAL CA: CPT

## 2025-05-12 PROCEDURE — 3074F SYST BP LT 130 MM HG: CPT

## 2025-05-12 RX ORDER — HYDROCHLOROTHIAZIDE 12.5 MG/1
12.5 TABLET ORAL DAILY
Qty: 90 TABLET | Refills: 3 | Status: SHIPPED | OUTPATIENT
Start: 2025-05-12

## 2025-05-12 RX ORDER — PANTOPRAZOLE SODIUM 20 MG/1
20 TABLET, DELAYED RELEASE ORAL DAILY
COMMUNITY

## 2025-05-12 NOTE — PROGRESS NOTES
Preceptor Attestation:    I discussed the patient with the resident and evaluated the patient in person. I have verified the content of the note, which accurately reflects my assessment of the patient and the plan of care.   Supervising Physician:  Morales Kee MD.

## 2025-05-12 NOTE — PROGRESS NOTES
Preventive Care Visit  [facility]  Nargis Mosher DO, Family Medicine  May 12, 2025      Assessment & Plan     Essential hypertension  Well controlled, continue hydrochlorothiazide and will check kidney function today. 1 year supply sent to preferred pharmacy.  - hydroCHLOROthiazide 12.5 MG tablet  Dispense: 90 tablet; Refill: 3  - Basic metabolic panel    Routine general medical examination at a health care facility  49 year old female with PMH of HTN and GERD presents for annual exam. UTD on cancer screenings and vaccines. Still menstruating monthly, last month cycle was delayed by 1 week so discussed menopause and viviana-menopausal counseling today at length.   - Hemoglobin A1c  - Lipid panel reflex to direct LDL Non-fasting    Bloating  Several month history of bloating with gas. Discussed recommendation for daily fiber supplement as well as keeping food journal to see if timing of symptoms is related to any particular food group (I.e. dairy, gluten). Patient requesting referral to GI to discuss symptoms further. Also recommend trial of decreasing daily intake of cumin seeds as patient endorses consuming large quantities of this and it appears bloating can be a side effect.   - Adult GI  Referral - Consult Only    Encounter for screening mammogram for breast cancer  - MA Screening Bilateral w/ Ernst    Gastroesophageal reflux disease without esophagitis  Self-discontinued OTC PPI therapy, discussed recommendation to avoid long term PPI use today. Recommend famotidine, patient will pick this up OTC.      Counseling  Appropriate preventive services were addressed with this patient via screening, questionnaire, or discussion as appropriate for fall prevention, nutrition, physical activity, Tobacco-use cessation, social engagement, weight loss and cognition.  Checklist reviewing preventive services available has been given to the patient.  Reviewed patient's diet, addressing concerns  "and/or questions.   She is at risk for lack of exercise and has been provided with information to increase physical activity for the benefit of her well-being.       Follow-up  Return in about 53 weeks (around 5/18/2026) for Annual Wellness Visit.    Tristan Brown is a 49 year old, presenting for the following:  Physical        5/12/2025     9:16 AM   Additional Questions   Roomed by gh   Accompanied by self         5/12/2025    Information    services provided? No          HPI    Doing well, BP well controlled and no issues with current medication    UTD on cancer screenings, wants to get mammogram annually due to concern for dense breast tissue, denies family history   LMP 4/27, was a week late, otherwise cycle has been normal   Mood is good, reflux is okay but wants to see GI for \"digestive issues\"  She has been extra gassy, no relation to dairy or gluten that she has noticed  No changes to BM, still having 1 regular BM per day       Advance Care Planning    Discussed advance care planning with patient; informed AVS has link to [facility].        5/10/2025   General Health   How would you rate your overall physical health? Good   Feel stress (tense, anxious, or unable to sleep) Only a little   (!) STRESS CONCERN      5/10/2025   Nutrition   Three or more servings of calcium each day? Yes   Diet: Low salt   How many servings of fruit and vegetables per day? (!) 2-3   How many sweetened beverages each day? (!) 2         5/10/2025   Exercise   Days per week of moderate/strenous exercise 3 days   Average minutes spent exercising at this level 30 min         5/10/2025   Social Factors   Frequency of gathering with friends or relatives Once a week   Worry food won't last until get money to buy more No   Food not last or not have enough money for food? No   Do you have housing? (Housing is defined as stable permanent housing and does not include staying outside in a car, in a tent, in an " abandoned building, in an overnight shelter, or couch-surfing.) Yes   Are you worried about losing your housing? No   Lack of transportation? No   Unable to get utilities (heat,electricity)? No         5/10/2025   Dental   Dentist two times every year? Yes           Today's PHQ-2 Score:       2/5/2025     9:15 AM   PHQ-2 ( 1999 Pfizer)   Q1: Little interest or pleasure in doing things 1   Q2: Feeling down, depressed or hopeless 1   PHQ-2 Score 2         5/10/2025   Substance Use   Alcohol more than 3/day or more than 7/wk Not Applicable   Do you use any other substances recreationally? No     Social History     Tobacco Use    Smoking status: Never    Smokeless tobacco: Never    Tobacco comments:      smokes   Substance Use Topics    Alcohol use: No     Alcohol/week: 0.0 standard drinks of alcohol    Drug use: No           5/30/2024   LAST FHS-7 RESULTS   1st degree relative breast or ovarian cancer No   Any relative bilateral breast cancer No   Any male have breast cancer No   Any ONE woman have BOTH breast AND ovarian cancer No   Any woman with breast cancer before 50yrs No   2 or more relatives with breast AND/OR ovarian cancer No   2 or more relatives with breast AND/OR bowel cancer No        Mammogram Screening - Mammogram every 1-2 years updated in Health Maintenance based on mutual decision making        5/10/2025   STI Screening   New sexual partner(s) since last STI/HIV test? No     History of abnormal Pap smear: No - age 30- 64 PAP with HPV every 5 years recommended        Latest Ref Rng & Units 4/17/2023    10:29 AM   PAP / HPV   PAP  Negative for Intraepithelial Lesion or Malignancy (NILM)    HPV 16 DNA Negative Negative    HPV 18 DNA Negative Negative    Other HR HPV Negative Negative      ASCVD Risk   The 10-year ASCVD risk score (Manuel VAZQUEZ, et al., 2019) is: 0.9%    Values used to calculate the score:      Age: 49 years      Sex: Female      Is Non- : No       "Diabetic: No      Tobacco smoker: No      Systolic Blood Pressure: 126 mmHg      Is BP treated: Yes      HDL Cholesterol: 55 mg/dL      Total Cholesterol: 156 mg/dL        5/10/2025   Contraception/Family Planning   Questions about contraception or family planning No        Reviewed and updated as needed this visit by Provider                    Past Medical History:   Diagnosis Date    Essential hypertension 2019    Gastroesophageal reflux disease     Gastroesophageal reflux disease without esophagitis 2021    Motion sickness     Other chronic pain      Past Surgical History:   Procedure Laterality Date    ESOPHAGOSCOPY, GASTROSCOPY, DUODENOSCOPY (EGD), COMBINED N/A 2022    Procedure: ESOPHAGOGASTRODUODENOSCOPY, WITH BIOPSY;  Surgeon: Luis Marin DO;  Location: [place] [facility]    NO HISTORY OF SURGERY       OB History    Para Term  AB Living   2 2 2 0 0 2   SAB IAB Ectopic Multiple Live Births   0 0 0 0 2      # Outcome Date GA Lbr Robert/2nd Weight Sex Type Anes PTL Lv   2 Term 17 38w4d  3.204 kg (7 lb 1 oz) M Vag-Spont  N LAN      Apgar1: 9  Apgar5: 9   1 Term 05 40w4d  3.175 kg (7 lb) F Vag-Vacuum None N LAN      Birth Comments: large head so used vaccuum but states shoulders came out easily     BP Readings from Last 3 Encounters:   25 126/82   25 124/77   24 121/75    Wt Readings from Last 3 Encounters:   25 54.6 kg (120 lb 6.4 oz)   25 55.7 kg (122 lb 12.8 oz)   24 55.7 kg (122 lb 12.8 oz)               Objective    Exam  /82   Pulse 75   Temp 97.9  F (36.6  C) (Tympanic)   Resp 20   Ht 1.532 m (5' 0.3\")   Wt 54.6 kg (120 lb 6.4 oz)   LMP 2025 (Approximate)   SpO2 100%   BMI 23.28 kg/m     Estimated body mass index is 23.28 kg/m  as calculated from the following:    Height as of this encounter: 1.532 m (5' 0.3\").    Weight as of this encounter: 54.6 kg (120 lb 6.4 oz).    Physical Exam  Vitals reviewed. "   Constitutional:       General: She is not in acute distress.     Appearance: Normal appearance. She is normal weight. She is not ill-appearing.   HENT:      Head: Normocephalic.      Right Ear: Tympanic membrane, ear canal and external ear normal.      Left Ear: Tympanic membrane, ear canal and external ear normal.      Nose: Nose normal.      Mouth/Throat:      Mouth: Mucous membranes are moist.      Pharynx: Oropharynx is clear. No oropharyngeal exudate or posterior oropharyngeal erythema.   Eyes:      Conjunctiva/sclera: Conjunctivae normal.      Pupils: Pupils are equal, round, and reactive to light.   Neck:      Comments: Thyroid normal to palpation   Cardiovascular:      Rate and Rhythm: Normal rate and regular rhythm.      Heart sounds: Normal heart sounds. No murmur heard.  Pulmonary:      Effort: Pulmonary effort is normal. No respiratory distress.      Breath sounds: Normal breath sounds. No wheezing.   Abdominal:      General: Abdomen is flat. Bowel sounds are normal. There is no distension.      Palpations: Abdomen is soft.   Musculoskeletal:      Cervical back: No tenderness.      Right lower leg: No edema.      Left lower leg: No edema.   Lymphadenopathy:      Cervical: No cervical adenopathy.   Skin:     General: Skin is warm and dry.   Neurological:      General: No focal deficit present.      Mental Status: She is alert.   Psychiatric:         Mood and Affect: Mood normal.         Behavior: Behavior normal.         Thought Content: Thought content normal.         Judgment: Judgment normal.           Signed Electronically by: Nargis Mosher DO

## 2025-05-12 NOTE — PATIENT INSTRUCTIONS
-FAMOTIDINE is the over the counter, safer reflux mediation, take twice daily before your 2 biggest meals   -Try adding fiber to your daily routine, maybe try decreasing consumption of cumin seeds to see if it helps with bloating  - keep food journal for GI appt   - schedule mammogram after 5/30/2025       Patient Education   Preventive Care Advice   This is general advice given by our system to help you stay healthy. However, your care team may have specific advice just for you. Please talk to your care team about your preventive care needs.  Nutrition  Eat 5 or more servings of fruits and vegetables each day.  Try wheat bread, brown rice and whole grain pasta (instead of white bread, rice, and pasta).  Get enough calcium and vitamin D. Check the label on foods and aim for 100% of the RDA (recommended daily allowance).  Lifestyle  Exercise at least 150 minutes each week  (30 minutes a day, 5 days a week).  Do muscle strengthening activities 2 days a week. These help control your weight and prevent disease.  No smoking.  Wear sunscreen to prevent skin cancer.  Have a dental exam and cleaning every 6 months.  Yearly exams  See your health care team every year to talk about:  Any changes in your health.  Any medicines your care team has prescribed.  Preventive care, family planning, and ways to prevent chronic diseases.  Shots (vaccines)   HPV shots (up to age 26), if you've never had them before.  Hepatitis B shots (up to age 59), if you've never had them before.  COVID-19 shot: Get this shot when it's due.  Flu shot: Get a flu shot every year.  Tetanus shot: Get a tetanus shot every 10 years.  Pneumococcal, hepatitis A, and RSV shots: Ask your care team if you need these based on your risk.  Shingles shot (for age 50 and up)  General health tests  Diabetes screening:  Starting at age 35, Get screened for diabetes at least every 3 years.  If you are younger than age 35, ask your care team if you should be screened  for diabetes.  Cholesterol test: At age 39, start having a cholesterol test every 5 years, or more often if advised.  Bone density scan (DEXA): At age 50, ask your care team if you should have this scan for osteoporosis (brittle bones).  Hepatitis C: Get tested at least once in your life.  STIs (sexually transmitted infections)  Before age 24: Ask your care team if you should be screened for STIs.  After age 24: Get screened for STIs if you're at risk. You are at risk for STIs (including HIV) if:  You are sexually active with more than one person.  You don't use condoms every time.  You or a partner was diagnosed with a sexually transmitted infection.  If you are at risk for HIV, ask about PrEP medicine to prevent HIV.  Get tested for HIV at least once in your life, whether you are at risk for HIV or not.  Cancer screening tests  Cervical cancer screening: If you have a cervix, begin getting regular cervical cancer screening tests starting at age 21.  Breast cancer scan (mammogram): If you've ever had breasts, begin having regular mammograms starting at age 40. This is a scan to check for breast cancer.  Colon cancer screening: It is important to start screening for colon cancer at age 45.  Have a colonoscopy test every 10 years (or more often if you're at risk) Or, ask your provider about stool tests like a FIT test every year or Cologuard test every 3 years.  To learn more about your testing options, visit:   .  For help making a decision, visit:   https://bit.ly/ie24527.  Prostate cancer screening test: If you have a prostate, ask your care team if a prostate cancer screening test (PSA) at age 55 is right for you.  Lung cancer screening: If you are a current or former smoker ages 50 to 80, ask your care team if ongoing lung cancer screenings are right for you.  For informational purposes only. Not to replace the advice of your health care provider. Copyright   2023 [employer]. All rights reserved.  Clinically reviewed by the [facility]. Devario [id number] - REV 01/24.

## 2025-05-13 ENCOUNTER — PATIENT OUTREACH (OUTPATIENT)
Dept: CARE COORDINATION | Facility: CLINIC | Age: 50
End: 2025-05-13
Payer: COMMERCIAL

## 2025-05-13 ENCOUNTER — DOCUMENTATION ONLY (OUTPATIENT)
Dept: GASTROENTEROLOGY | Facility: CLINIC | Age: 50
End: 2025-05-13
Payer: COMMERCIAL

## 2025-05-15 ENCOUNTER — RESULTS FOLLOW-UP (OUTPATIENT)
Dept: FAMILY MEDICINE | Facility: CLINIC | Age: 50
End: 2025-05-15

## 2025-05-20 ENCOUNTER — DOCUMENTATION ONLY (OUTPATIENT)
Dept: GASTROENTEROLOGY | Facility: CLINIC | Age: 50
End: 2025-05-20
Payer: COMMERCIAL

## 2025-05-20 NOTE — PROGRESS NOTES
Record request is faxed to [facility]-05/20/2025  Jamal Armstrong MA  Second attempt

## 2025-06-04 ENCOUNTER — ANCILLARY PROCEDURE (OUTPATIENT)
Dept: MAMMOGRAPHY | Facility: CLINIC | Age: 50
End: 2025-06-04
Payer: COMMERCIAL

## 2025-06-04 DIAGNOSIS — Z12.31 ENCOUNTER FOR SCREENING MAMMOGRAM FOR BREAST CANCER: ICD-10-CM

## 2025-06-04 PROCEDURE — 77063 BREAST TOMOSYNTHESIS BI: CPT | Mod: TC | Performed by: RADIOLOGY

## 2025-06-04 PROCEDURE — 77067 SCR MAMMO BI INCL CAD: CPT | Mod: TC | Performed by: RADIOLOGY

## 2025-06-18 ENCOUNTER — TELEPHONE (OUTPATIENT)
Dept: GASTROENTEROLOGY | Facility: CLINIC | Age: 50
End: 2025-06-18
Payer: COMMERCIAL

## 2025-06-18 NOTE — TELEPHONE ENCOUNTER
received a message from clinic manager Theresa LOPEZ to help get Pt scheduled for a new Gen GI appointment with an CHELSEY. Gracer called and talked with the Pt. Pt accepted an appointment with Nena Phillips on 7/24/2025 at 11:30 AM. The clinic location was confirmed and verified with the Pt.  also provided Pt with the clinic address.     Pt requested for an appointment in July.

## 2025-06-18 NOTE — TELEPHONE ENCOUNTER
REFERRAL INFORMATION:  Referring Provider:  Nargis Mosher DO   Referring Clinic:  [facility]   Reason for Visit/Diagnosis: R14.0 (ICD-10-CM) - Bloating   gassy, bloating, reflux    FUTURE VISIT INFORMATION:  Appointment Date: 7/24/25     NOTES STATUS DETAILS   OFFICE NOTE from Referring Provider Internal 5/12/25   MEDICATION LIST Internal    PROCEDURES     ENDOSCOPY  Internal 1/4/22  [facility]: 2/8/23  [facility]: 2/11/25   STOOL TESTING     LABS     PERTINENT LABS Internal    PATHOLOGY REPORTS (RELATED) Care Everywhere [facility]: EGD2/11/25  [facility]: EGD 1/4/22   IMAGES       Records Requested   June 18, 2025 2:21 PM  [email or web address]   Facility  [facility]   Outcome Requested -11/6/24-XR Esophagram-in PACS

## 2025-07-24 ENCOUNTER — PRE VISIT (OUTPATIENT)
Dept: GASTROENTEROLOGY | Facility: CLINIC | Age: 50
End: 2025-07-24

## 2025-07-24 ENCOUNTER — OFFICE VISIT (OUTPATIENT)
Dept: GASTROENTEROLOGY | Facility: CLINIC | Age: 50
End: 2025-07-24
Payer: COMMERCIAL

## 2025-07-24 VITALS
BODY MASS INDEX: 23.18 KG/M2 | HEART RATE: 60 BPM | DIASTOLIC BLOOD PRESSURE: 79 MMHG | OXYGEN SATURATION: 99 % | RESPIRATION RATE: 18 BRPM | SYSTOLIC BLOOD PRESSURE: 128 MMHG | WEIGHT: 118.08 LBS | HEIGHT: 60 IN

## 2025-07-24 DIAGNOSIS — R43.2 ABNORMAL TASTE IN MOUTH: Primary | ICD-10-CM

## 2025-07-24 DIAGNOSIS — R14.0 BLOATING: ICD-10-CM

## 2025-07-24 ASSESSMENT — PAIN SCALES - GENERAL: PAINLEVEL_OUTOF10: NO PAIN (0)

## 2025-07-24 NOTE — PATIENT INSTRUCTIONS
"It was a pleasure taking care of you today.  I've included a brief summary of our discussion and care plan from today's visit below.  Please review this information with your primary care provider.  _______________________________________________________________________    My recommendations are summarized as follows:    Try diaphragmatic breathing exercises - this is a good source  [email or web address]    Trial of alginate product with meals and before sleep   -Reflux Gourmet OR Gaviscon Extra Strength - Amazon or on their website    See below information about Gut / Brain Axis along with visceral hyper-sensitivity   We can consider starting a medication called a neuromodulator to help with the pain and GI symptoms. These medications are considered antidepressants but in the GI world, we use them at much lower doses than what one would typically take for management of mood troubles. There has been good data to support their use in GI patients who have abdominal pain as one of their predominate symptoms. The GI tract is lined with central nervous tissue and the brain and the gut are always talking to each other. If you've ever had a \"nervous stomach\" or a \"butterflies in the stomach\" sensation, that is the gut-brain communication at work. What happens in some individuals is that signals sent from the brain and gut can become disordered and within that junction of disordered signaling, significant symptoms like pain, irregular bowel patterns, bloat, nausea can develop. These neuromodulators help target and modulate those signals.      I've included some information on the gut brain axis from [facility]   [email or web address]    4. Dietary consult     5. Consider bowel clean out over a free weekend. You can add a low dose Citrucel daily after the clean out too " - 1 Tbsp in morning mixed in water and drink it     Bowel cleanout  --  ONE Miralax bottle (238 g, 14 caps) and TWO 32 ounce Gatorade or other clear liquid (64 ounces). Mix the two. Allow to sit in fridge for 30-60 minutes to allow the Miralax to fully dissolve. Then drink 1 glass every 15-30 minutes over the next 3-4 hours. You should experience loose bowel movements that are watery in consistency when complete.    Return to GI Clinic in 3 months to review your progress.     Please see below for any additional questions and scheduling guidelines.    Sign up for [employer]: [employer] patient portal serves as a secure platform for accessing your medical records from the [facility]. Additionally, [employer] facilitates easy, timely, and secure messaging with your care team. If you have not signed up, you may do so by using the provided code or calling [phone].    Coordinating your care after your visit:  There are multiple options for scheduling your follow-up care based on your provider's recommendation.    How do I schedule a follow-up clinic appointment:   After your appointment, you may receive scheduling assistance with the Clinic Coordinators by having a seat in the waiting room and a Clinic Coordinator will call you up to schedule.  Virtual visits or after you leave the clinic:  Your provider has placed a follow-up order in the [employer] portal for scheduling your return appointment. A member of the scheduling team will contact you to schedule.  [employer] Scheduling: Timely scheduling through [employer] is advised to ensure appointment availability.   Call to schedule: You may schedule your follow-up appointment(s) by calling [phone], option 1.    How do I schedule my endoscopy or colonoscopy procedure:  If a procedure, such as a colonoscopy or upper endoscopy was ordered by your provider, the scheduling team will contact you to schedule this procedure. Or you may choose to call to schedule at    [phone], option 1.  Please allow 20-30 minutes when scheduling a procedure.    How do I get my blood work done? To get your blood work done, you need to schedule a lab appointment at an [facility]. There are multiple ways to schedule:   At the clinic: The Clinic Coordinator you meet after your visit can help you schedule a lab appointment.   [employer] scheduling: [employer] offers online lab scheduling at all [facility] locations.   Call to schedule: You can call [phone] to schedule your lab appointment.    How do I schedule my imaging study: To schedule imaging studies, such as CT scans, ultrasounds, MRIs, or X-rays, contact Imaging Services at [phone].    How do I schedule a referral to another doctor: If your provider recommended a referral to another specialist(s), the referral order was placed by your provider. You will receive a phone call to schedule this referral, or you may choose to call the number attached to the referral to self-schedule.    For Post-Visit Question(s):  For any inquiries following today's visit:  Please utilize [employer] messaging and allow 48 hours for reply or contact the Call Center during normal business hours at [phone], option 3.  For Emergent After-hours questions, contact the On-Call GI Fellow through the [facility]  at [phone].  In addition, you may contact your Nurse directly using the provided contact information.    Test Results:  Test results will be accessible via [employer] in compliance with the 21st Century Cures Act. This means that your results will be available to you at the same time as your provider. Often you may see your results before your provider does. Results are reviewed by staff within two weeks with communication follow-up. Results may be released in the patient portal prior to your care team review.    Prescription Refill(s):  Medication prescribed by your provider will be addressed  during your visit. For future refills, please coordinate with your pharmacy. If you have not had a recent clinic visit or routine labs, for your safety, your provider may not be able to refill your prescription.     Sincerely,    Nena Phillips PA-C  Gastroenterology

## 2025-07-24 NOTE — PROGRESS NOTES
"  GI CLINIC VISIT    CC/REFERRING MD:  Referred Self  REASON FOR CONSULTATION: bloat, sour taste     ASSESSMENT/PLAN:  Stephanie Garcia is a 49 year old year old female with PMHx of HTN (on hydrochlorothiazide) following with the [facility] GI group for an alternate opinion on her ongoing GI sx. Evaluated with multiple other teams.     #sour taste in the mouth x 4 years   Extensively evaluated with XR esophagram x2 and EGDs with BRAVO x2 that she reports were done OFF therapy - these all came back unremarkable.     2023 - 48 hr BRAVO with total AET of 0.1% and DeMeester score of 0.6. Her sx diary included 5 occurences of \"N/A\" and 1 occurrence of sourness in her mouth. The Symptom Index of reflux for each recorded sx (\"N/A\" and sour taste in her mouth) was 0 for each - essentially, reflux episodes did not correlate with these recorded sx.     2025 96 hr pH BRAVO with AET 0% and DeMeester score 0.4. Her sx diary was not detailed but the report noted that reflux episodes did not correlate with symptoms noted during the study (on days 1, 2, and no sx data was provided for 3)    Based on the above testing, she does not have pathologic acid reflux exposure and does not meet the diagnosis for GERD, per Boykin 2.0 consensus. She also does not have evidence of a hiatal hernia either. Based on her continued sx and above testing, she likely has functional heartburn (DGBI) vs other (possible nutrient deficiency). Suspect she also has a component of visceral hypersensitivity too. We started a discussion about DGBI today. I provided information about the gut/brain axis the AVS for her to review.     As her sx are still quite persistent and seem to be triggered with laying supine (positional), we agreed on a trial of diaphragmatic breathing exercises and a scheduled alginate product. Depending on her response, we will consider a neuromodulator or an alternate agent at a future appt.     Future consideration  1.Dysgeusia - nutrient markers " - zinc, in particular and other as clinically indicated. B12 normal 639 (2/2025)  2.Meds - BID PPI, neuromodulator such as TCA +/- FD Santa +/- famotidine (which worked better than PPIs)  3. Ensure updated dental exam     #Gassiness/increased flatus x 2y  EGD x 2 was normal with gastric biopsy (2025 negative for h.pylori infection). Duodenum appeared normal but was not sampled, though she denies any h/o celiac disease in her family. 2024 Cologuard was NEG. Gassiness seems to be linked with dietary intake, so will get her over to dietary to review intake and discuss low fodmap diet with retrial with monitoring. Though she's stooling regularly she can have some bouts of constipation I recommended a bowel clean out with start of daily Citrucel.     Other considerations include - celiac disease, SIBO vs other.     Plan  -GI RD consult  -bowel clean out then daily Citrucel     Future consideration  1.celiac serologies     #CRC screening  Cologuard 2024 NEG (due 2027)    Orders Placed This Encounter   Procedures    Adult Nutrition  Referral     RTC 3 mo or sooner PRN     Thank you for this consultation.  It was a pleasure to participate in the care of this patient; please contact me with any further questions.     Nena Phillips PA-C    Follow up: As planned above. Today, I personally spent 100  minutes spent on the date of the encounter doing chart review, history and exam, documentation and further activities per the note.    HPI  Stephanie Garcia is a 49 year old year old female with PMHx of HTN (on hydrochlorothiazide) following with the [facility] GI group for an alternate opinion on her ongoing GI sx.     Historically these sx started about 4y ago. States seen with her primary but some meds that didn't work. Then saw [facility] team who performed testing to include XR eso referred her to [facility] to establish care who performed an EGD with BRAVO off therapy (2023) that came back unremarkable of GERD or  hernia. [facility] team discussed potential surgery given her continued symptoms which scared her so she decided against it. She sought another opinion from [facility]'s General Surgery team in 2024 about need for a hernia repair. Testing included XR eso and EGD with BRAVO that came both came back negative / unremarkable without evidence of hiatal hernia or GERD. [facility]'s surgeon felt there was no indication for surgery given NEG testing and recommended against a surgery. She then went back to primary who then referred [facility] to  [facility] given her continued symptoms.     Her biggest goal is sx management and shares her most problematic sx include sour taste in her mouth  and gassiness/increased flatus.     Sour taste in mouth -  worsens at night when she lays down at night, has this nightly. Feels sour/acid coming up in AM and drinks to try to help it. She's mindful of elevating HoB, avoiding trigger foods (citrus, caffeine - just 1 cup tea daily), avoiding eating too late, wears loose fitting clothing  -in 2020 when this all started, she had chest pain but this resolved after a year then she started with sour taste in her mouth; intermittent, mild stabbing sensation noted to left and right side of chest as well   -?heartburn sx   -no nocturnal coughing  -no trouble w/ initial swallow - coughing/gagging/spitting up  -no esophogeal dysphagia/odynphagia   -No nausea/vomiting - does have motion sickness and can have nausea if she goes on a car ride   -no voice hoarseness, regurgitation   -no abd pain     No NSAIDs, will take PRN APAP if she needs something  On hydrochlorothiazide 12.5 mg in AM, started this med in 2020.   These sx have started during COVID, she wonder is stress factored into her sx. In general she denies stress now.     No Fhx of eso issues including BE / eso cancer     Meds tried   -Omeprazole x 3 months, sx were still present without much change at all   -Famotidine once  daily (unclear dosage) - improved but still had sx   -Pantoprazole x 3+ months, sx were still present without much change at all   -No other OTC agents     2. Gassiness and increased flatus - onset in last 2 years or so  -states she used to have issues w/ post-prandial bloating but that it's not an issue these days   -usually gassiness is worst after lunch or after dinner  -also endorses distension in abdomen and fullness too   -Will have some discomfort and this all improves after flatus   -she was eating cumin seeds regularly as she likes snacking on them, she decreased the intake and then gassiness got better     No associated appetite changes, states she's finishing her meals but trying to eat smaller volume meals d/t reflux troubles   Lost 5-6 lb this yr that she thinks may be connected to some dietary changes she made, no further weight loss     Bowel Movements - occasional constipation  1-2x daily, soft, on toilet for 1 min to stool, infrequent straining   -sometimes will have incomplete evacuation but in general she feels completely evacuated   -no bloody or black stools   5/2024 - Cologuard was NEG, no known fhx of CRC    Prior Work Up   10/1/2021 Esophagram  ESOPHAGUS: Normal caliber cervical esophagus without evidence of a stricture, suspicious filling defect or diverticulum.  Normal caliber thoracic esophagus without evidence of a stricture or suspicious filling defect. Normal peristalsis. No hiatal hernia. No spontaneous gastroesophageal reflux was observed during this exam.                                                            IMPRESSION: Normal esophagram. No hiatal hernia. No spontaneous gastroesophageal reflux was observed during this exam.    11/06/2024 Esophagram   ESOPHAGUS: Normal peristalsis. No strictures, masses, or inflammatory changes. Minimal gastroesophageal reflux in the recumbent position.   IMPRESSION:  1. Minimal gastroesophageal reflux.  2. No significant hiatal  "hernia.    2/8/2023 EGD with BRAVO (off therapy) with [facility]  EGD Findings: Normal esophagus, stomach, duodenum   EGD Impressions: Normal macroscopic examination   96 hr BRAVO Impression: Normal ambulatory esophageal pH study.     2/11/2025 EGD with BRAVO (off therapy) with [facility]   EGD Findings: Normal esophagus, stomach, duodenum   EGD Impressions: Normal macroscopic examination   Gastric Bx - focal mild chronic gastritis without activity. Negative for h.pylori. no mention of GIM or dysplasia.   96 hr BRAVO Impression: Normal ambulatory esophageal pH study.     5/15/25 - Cologuard NEG    Chart Notes   8/29/24 OV note from [facility] is not readable (Media)    11/11/2024 - OV note from [facility] Dr Yosef Marin  Assessment/Plan: Stephanie Garcia continues to have reflux despite her low-dose medications.  We discussed increasing her dosage or changing medications completely in conjunction with dietary and lifestyle management strategies.  She has been doing this for some time and is not very much interested in continuing to take medications.  Having said that we also discussed surgical options including partial fundoplication versus magnetic sphincter augmentation.  We went over the risks and benefits of each and the pros and cons of each approach.  She would like to consider her options and will call back into the clinic when she is ready to schedule surgery or discuss the next steps moving forward.     2/15/25 - OV note from [facility] Dr Martha Coats - \"After testing with EGD and esophagram, patient does not have hiatal hernia does not have any significant reflux. Therefore, no surgical intervention is indicated. \"    Family Hx  No other known family history or GI related malignancy (esophageal, gastric, pancreatic, liver or colon) or family history of IBD/celiac disease.     Social Hx   No use of NSAIDs    No ETOH  No tobacco products   No recreational drug use     PERTINENT PAST MEDICAL HISTORY:  Past " Medical History:   Diagnosis Date    Essential hypertension 12/18/2019    Gastroesophageal reflux disease     Gastroesophageal reflux disease without esophagitis 11/19/2021    Motion sickness     Other chronic pain        PREVIOUS SURGERIES:  Past Surgical History:   Procedure Laterality Date    ESOPHAGOSCOPY, GASTROSCOPY, DUODENOSCOPY (EGD), COMBINED N/A 1/4/2022    Procedure: ESOPHAGOGASTRODUODENOSCOPY, WITH BIOPSY;  Surgeon: Luis Marin DO;  Location: [facility]    NO HISTORY OF SURGERY         ALLERGIES:     Allergies   Allergen Reactions    Nkda [No Known Drug Allergy]        PERTINENT MEDICATIONS:    Current Outpatient Medications:     hydroCHLOROthiazide 12.5 MG tablet, Take 1 tablet (12.5 mg) by mouth daily., Disp: 90 tablet, Rfl: 3    pantoprazole (PROTONIX) 20 MG EC tablet, Take 20 mg by mouth daily. (Patient not taking: Reported on 7/24/2025), Disp: , Rfl:     SOCIAL HISTORY:  Social History     Socioeconomic History    Marital status:      Spouse name: Not on file    Number of children: Not on file    Years of education: Not on file    Highest education level: Not on file   Occupational History    Occupation: retail and restaurant   Tobacco Use    Smoking status: Never    Smokeless tobacco: Never    Tobacco comments:      smokes   Substance and Sexual Activity    Alcohol use: No     Alcohol/week: 0.0 standard drinks of alcohol    Drug use: No    Sexual activity: Yes     Partners: Male   Other Topics Concern    Not on file   Social History Narrative    Not on file     Social Drivers of Health     Financial Resource Strain: Low Risk  (5/10/2025)    Financial Resource Strain     Within the past 12 months, have you or your family members you live with been unable to get utilities (heat, electricity) when it was really needed?: No   Food Insecurity: Low Risk  (5/10/2025)    Food Insecurity     Within the past 12 months, did you worry that your food would run out before you got  money to buy more?: No     Within the past 12 months, did the food you bought just not last and you didn t have money to get more?: No   Transportation Needs: Low Risk  (5/10/2025)    Transportation Needs     Within the past 12 months, has lack of transportation kept you from medical appointments, getting your medicines, non-medical meetings or appointments, work, or from getting things that you need?: No   Physical Activity: Insufficiently Active (5/10/2025)    Exercise Vital Sign     Days of Exercise per Week: 3 days     Minutes of Exercise per Session: 30 min   Stress: No Stress Concern Present (5/10/2025)    [employer] [place] [employer] - Occupational Stress Questionnaire     Feeling of Stress : Only a little   Social Connections: Unknown (5/10/2025)    Social Connection and Isolation Panel [NHANES]     Frequency of Communication with Friends and Family: Not on file     Frequency of Social Gatherings with Friends and Family: Once a week     Attends Jainism Services: Not on file     Active Member of Clubs or Organizations: Not on file     Attends Club or Organization Meetings: Not on file     Marital Status: Not on file   Interpersonal Safety: Low Risk  (5/12/2025)    Interpersonal Safety     Do you feel physically and emotionally safe where you currently live?: Yes     Within the past 12 months, have you been hit, slapped, kicked or otherwise physically hurt by someone?: No     Within the past 12 months, have you been humiliated or emotionally abused in other ways by your partner or ex-partner?: No   Housing Stability: Low Risk  (5/10/2025)    Housing Stability     Do you have housing? : Yes     Are you worried about losing your housing?: No       FAMILY HISTORY:  Family History   Problem Relation Age of Onset    Diabetes Mother     Hypertension Mother     Hypertension Father     Deep Vein Thrombosis (DVT) Father     Hypertension Sister     Hypertension Sister     No Known Problems Brother     No  "Known Problems Maternal Grandmother     No Known Problems Maternal Grandfather     No Known Problems Paternal Grandmother     No Known Problems Paternal Grandfather     No Known Problems Daughter     No Known Problems Son     No Known Problems Maternal Half-Brother     No Known Problems Maternal Half-Sister     No Known Problems Paternal Half-Brother     No Known Problems Paternal Half-Sister     No Known Problems Niece     No Known Problems Nephew     No Known Problems Cousin     No Known Problems Other     Coronary Artery Disease No family hx of     Breast Cancer No family hx of     Colon Cancer No family hx of     Prostate Cancer No family hx of     Other Cancer No family hx of     Cancer No family hx of     Heart Disease No family hx of     Hyperlipidemia No family hx of     Kidney Disease No family hx of     Cerebrovascular Disease No family hx of     Obesity No family hx of     Thrombosis No family hx of     Asthma No family hx of     Arthritis No family hx of     Thyroid Disease No family hx of     Depression No family hx of     Mental Illness No family hx of     Substance Abuse No family hx of     Cystic Fibrosis No family hx of     Early Death No family hx of     Coronary Artery Disease Early Onset No family hx of     Heart Failure No family hx of     Bleeding Diathesis No family hx of     Dementia No family hx of     Ovarian Cancer No family hx of     Uterine Cancer No family hx of     Colorectal Cancer No family hx of     Pancreatic Cancer No family hx of     Lung Cancer No family hx of     Melanoma No family hx of     Autoimmune Disease No family hx of     Unknown/Adopted No family hx of     Genetic Disorder No family hx of        Past/family/social history reviewed and no changes    PHYSICAL EXAMINATION:  Vitals reviewed: /79 (BP Location: Left arm)   Pulse 60   Resp 18   Ht 1.532 m (5' 0.32\")   Wt 53.6 kg (118 lb 1.3 oz)   SpO2 99%   BMI 22.82 kg/m    Constitutional: aaox3, cooperative, " pleasant, not dyspneic/diaphoretic, no acute distress  Eyes: Sclera anicteric/injected  Ears/nose/mouth/throat: hearing intact  Neck: supple, active ROM w/o limitation or pain   CV: No edema  Respiratory: Unlabored breathing  Abd:  Nondistended, no hepatosplenomegaly, hypertympanic on percussion throughout with some dullness noted to LLQ, nontender, no peritoneal signs, neg Ortiz's sign  Skin: warm, perfused, no jaundice  Psych: Normal affect  MSK: Normal gait     PERTINENT STUDIES:    Office Visit on 05/12/2025   Component Date Value Ref Range Status    Sodium 05/12/2025 139  135 - 145 mmol/L Final    Potassium 05/12/2025 4.1  3.4 - 5.3 mmol/L Final    Chloride 05/12/2025 103  98 - 107 mmol/L Final    Carbon Dioxide (CO2) 05/12/2025 26  22 - 29 mmol/L Final    Anion Gap 05/12/2025 10  7 - 15 mmol/L Final    Urea Nitrogen 05/12/2025 17.7  6.0 - 20.0 mg/dL Final    Creatinine 05/12/2025 0.63  0.51 - 0.95 mg/dL Final    GFR Estimate 05/12/2025 >90  >60 mL/min/1.73m2 Final    Calcium 05/12/2025 9.5  8.8 - 10.4 mg/dL Final    Glucose 05/12/2025 86  70 - 99 mg/dL Final    Patient Fasting > 8hrs? 05/12/2025 Unknown   Final    Cholesterol 05/12/2025 169  <200 mg/dL Final    Triglycerides 05/12/2025 121  <150 mg/dL Final    Direct Measure HDL 05/12/2025 66  >=50 mg/dL Final    LDL Cholesterol Calculated 05/12/2025 79  <100 mg/dL Final    Non HDL Cholesterol 05/12/2025 103  <130 mg/dL Final    Patient Fasting > 8hrs? 05/12/2025 Unknown   Final    Estimated Average Glucose 05/12/2025 100  <117 mg/dL Final    Hemoglobin A1C 05/12/2025 5.1  0.0 - 5.6 % Final     Last Comprehensive Metabolic Panel:  Lab Results   Component Value Date     05/12/2025    POTASSIUM 4.1 05/12/2025    CHLORIDE 103 05/12/2025    CO2 26 05/12/2025    ANIONGAP 10 05/12/2025    GLC 86 05/12/2025    BUN 17.7 05/12/2025    CR 0.63 05/12/2025    GFRESTIMATED >90 05/12/2025    SMITH 9.5 05/12/2025       TSH   Date Value Ref Range Status   04/17/2023 2.68  0.30 - 4.20 uIU/mL Final        PREVIOUS ENDOSCOPY      2/8/2023 EGD pH 48 hr monitoring   Impression: Normal macro exam  BRAVO: Normal ambulatory esophogeal pH study   Acid exposure time 0.1%  DeMeester score 0.6  Nubmer of Sx occurance: 5 (N/) and 1 sourness in mouth  Sx index of reflux (SI): 0,0  Sx association prob (SAP): 0,0      2/17/2025 - EGD w/ BRAVO off therapy   Impression:            - Normal esophagus.                          - Normal stomach. Biopsied.                          - Normal examined duodenum.                          - The BRAVO capsule was deployed.     FINAL DIAGNOSIS    A.  Stomach, biopsy:  Focal mild chronic gastritis with no activity  Negative for Helicobacter pylori on H&E        BRAVO Impression: Normal ambulatory esophageal pH study.  Findings:       DAY 1 ACID REFLUX ANALYSIS:       - Acid Exposure with pH < 4.0:       Total Percent Time: 1.2 %.       - Number of Reflux Episodes:       Total Refluxes: 8       Number of reflux episodes > 5 minutes: 0.       - Longest reflux episode: 4 minutes       - Symptom Correlation: Reflux episodes did not correlate with       symptoms noted during the study.       - DeMeester Score: 3.4(N<14.7)       - See the [employer] data report for further details.       DAY 2 ACID REFLUX ANALYSIS:       - Acid Exposure Time(s) for pH < 4.0:       Total Percent Time: 0 %.       - Number of Reflux Episodes:       Total Refluxes: 0       Number of reflux episodes > 5 minutes: 0.       - Symptom Correlation: Reflux episodes did not correlate with       symptoms noted during the study.       - DeMeester Score: 0.3(N<14.7)       - See the [employer] BRAVO data report for further details.       DAY 3 ACID REFLUX ANALYSIS:       - Acid Exposure Time(s) for pH < 4.0:       Total Percent Time: 0 %.       - Number of Reflux Episodes:       Total Refluxes: 1       Number of reflux episodes > 5 minutes: 0.       - Longest reflux episode: 0 minutes.       -  DeMeester Score: 0.4(N<14.7)       - See the [employer] BRAVO data report for further details.

## 2025-07-24 NOTE — NURSING NOTE
"Do you have a history of colon cancer in your immediate family? No    If yes who: negative     And what age  were they diagnosed: n/a      Chief Complaint   Patient presents with    New Patient       Vitals:    07/24/25 1114   BP: 128/79   BP Location: Left arm   Pulse: 60   Resp: 18   SpO2: 99%   Weight: 53.6 kg (118 lb 1.3 oz)   Height: 1.532 m (5' 0.32\")       Body mass index is 22.82 kg/m .    Ama Bran MA    "

## 2025-07-24 NOTE — LETTER
"7/24/2025      Stephanie Garcia  [address]  [place] [address]      Dear Colleague,    Thank you for referring your patient, Stephanie Garcia, to the [facility] [place]. Please see a copy of my visit note below.      GI CLINIC VISIT    CC/REFERRING MD:  Referred Self  REASON FOR CONSULTATION: bloat, sour taste     ASSESSMENT/PLAN:  Stephanie Garcia is a 49 year old year old female with PMHx of HTN (on hydrochlorothiazide) following with the [facility] GI group for an alternate opinion on her ongoing GI sx. Evaluated with multiple other teams.     #sour taste in the mouth x 4 years   Extensively evaluated with XR esophagram x2 and EGDs with BRAVO x2 that she reports were done OFF therapy - these all came back unremarkable.     2023 - 48 hr BRAVO with total AET of 0.1% and DeMeester score of 0.6. Her sx diary included 5 occurences of \"N/A\" and 1 occurrence of sourness in her mouth. The Symptom Index of reflux for each recorded sx (\"N/A\" and sour taste in her mouth) was 0 for each - essentially, reflux episodes did not correlate with these recorded sx.     2025 96 hr pH BRAVO with AET 0% and DeMeester score 0.4. Her sx diary was not detailed but the report noted that reflux episodes did not correlate with symptoms noted during the study (on days 1, 2, and no sx data was provided for 3)    Based on the above testing, she does not have pathologic acid reflux exposure and does not meet the diagnosis for GERD, per Boykin 2.0 consensus. She also does not have evidence of a hiatal hernia either. Based on her continued sx and above testing, she likely has functional heartburn (DGBI) vs other (possible nutrient deficiency). Suspect she also has a component of visceral hypersensitivity too. We started a discussion about DGBI today. I provided information about the gut/brain axis the AVS for her to review.     As her sx are still quite persistent and seem to be triggered with laying " supine (positional), we agreed on a trial of diaphragmatic breathing exercises and a scheduled alginate product. Depending on her response, we will consider a neuromodulator or an alternate agent at a future appt.     Future consideration  1.Dysgeusia - nutrient markers - zinc, in particular and other as clinically indicated. B12 normal 639 (2/2025)  2.Meds - BID PPI, neuromodulator such as TCA +/- FD Santa +/- famotidine (which worked better than PPIs)  3. Ensure updated dental exam     #Gassiness/increased flatus x 2y  EGD x 2 was normal with gastric biopsy (2025 negative for h.pylori infection). Duodenum appeared normal but was not sampled, though she denies any h/o celiac disease in her family. 2024 Cologuard was NEG. Gassiness seems to be linked with dietary intake, so will get her over to dietary to review intake and discuss low fodmap diet with retrial with monitoring. Though she's stooling regularly she can have some bouts of constipation I recommended a bowel clean out with start of daily Citrucel.     Other considerations include - celiac disease, SIBO vs other.     Plan  -GI RD consult  -bowel clean out then daily Citrucel     Future consideration  1.celiac serologies     #CRC screening  Cologuard 2024 NEG (due 2027)    Orders Placed This Encounter   Procedures     Adult Nutrition  Referral     RTC 3 mo or sooner PRN     Thank you for this consultation.  It was a pleasure to participate in the care of this patient; please contact me with any further questions.     Nena Phillips PA-C    Follow up: As planned above. Today, I personally spent 100  minutes spent on the date of the encounter doing chart review, history and exam, documentation and further activities per the note.    HPI  Stephanie Garcia is a 49 year old year old female with PMHx of HTN (on hydrochlorothiazide) following with the [facility] GI group for an alternate opinion on her ongoing GI sx.     Historically these sx started about 4y ago.  [facility] seen with her primary but some meds that didn't work. Then saw [facility] team who performed testing to include XR eso referred her to [facility] to establish care who performed an EGD with BRAVO off therapy (2023) that came back unremarkable of GERD or hernia. [facility] General Surgery team discussed potential surgery given her continued symptoms which scared her so she decided against it. She sought another opinion from [facility] team in 2024 about need for a hernia repair. Testing included XR eso and EGD with BRAVO that came both came back negative / unremarkable without evidence of hiatal hernia or GERD. [facility] surgeon felt there was no indication for surgery given NEG testing and recommended against a surgery. She then went back to primary who then [facility] given her continued symptoms.     Her biggest goal is sx management and shares her most problematic sx include sour taste in her mouth  and gassiness/increased flatus.     Sour taste in mouth -  worsens at night when she lays down at night, has this nightly. Feels sour/acid coming up in AM and drinks to try to help it. She's mindful of elevating HoB, avoiding trigger foods (citrus, caffeine - just 1 cup tea daily), avoiding eating too late, wears loose fitting clothing  -in 2020 when this all started, she had chest pain but this resolved after a year then she started with sour taste in her mouth; intermittent, mild stabbing sensation noted to left and right side of chest as well   -?heartburn sx   -no nocturnal coughing  -no trouble w/ initial swallow - coughing/gagging/spitting up  -no esophogeal dysphagia/odynphagia   -No nausea/vomiting - does have motion sickness and can have nausea if she goes on a car ride   -no voice hoarseness, regurgitation   -no abd pain     No NSAIDs, will take PRN APAP if she needs something  On hydrochlorothiazide 12.5 mg in AM, started this med in 2020.    These sx have started during COVID, she wonder is stress factored into her sx. In general she denies stress now.     No Fhx of eso issues including BE / eso cancer     Meds tried   -Omeprazole x 3 months, sx were still present without much change at all   -Famotidine once daily (unclear dosage) - improved but still had sx   -Pantoprazole x 3+ months, sx were still present without much change at all   -No other OTC agents     2. Gassiness and increased flatus - onset in last 2 years or so  -states she used to have issues w/ post-prandial bloating but that it's not an issue these days   -usually gassiness is worst after lunch or after dinner  -also endorses distension in abdomen and fullness too   -Will have some discomfort and this all improves after flatus   -she was eating cumin seeds regularly as she likes snacking on them, she decreased the intake and then gassiness got better     No associated appetite changes, states she's finishing her meals but trying to eat smaller volume meals d/t reflux troubles   Lost 5-6 lb this yr that she thinks may be connected to some dietary changes she made, no further weight loss     Bowel Movements - occasional constipation  1-2x daily, soft, on toilet for 1 min to stool, infrequent straining   -sometimes will have incomplete evacuation but in general she feels completely evacuated   -no bloody or black stools   5/2024 - Cologuard was NEG, no known fhx of CRC    Prior Work Up   10/1/2021 Esophagram  ESOPHAGUS: Normal caliber cervical esophagus without evidence of a stricture, suspicious filling defect or diverticulum.  Normal caliber thoracic esophagus without evidence of a stricture or suspicious filling defect. Normal peristalsis. No hiatal hernia. No spontaneous gastroesophageal reflux was observed during this exam.                                                            IMPRESSION: Normal esophagram. No hiatal hernia. No spontaneous gastroesophageal reflux was observed  "during this exam.    11/06/2024 Esophagram   ESOPHAGUS: Normal peristalsis. No strictures, masses, or inflammatory changes. Minimal gastroesophageal reflux in the recumbent position.   IMPRESSION:  1. Minimal gastroesophageal reflux.  2. No significant hiatal hernia.    2/8/2023 EGD with BRAVO (off therapy) with [facility]  EGD Findings: Normal esophagus, stomach, duodenum   EGD Impressions: Normal macroscopic examination   96 hr BRAVO Impression: Normal ambulatory esophageal pH study.     2/11/2025 EGD with BRAVO (off therapy) with [facility]   EGD Findings: Normal esophagus, stomach, duodenum   EGD Impressions: Normal macroscopic examination   Gastric Bx - focal mild chronic gastritis without activity. Negative for h.pylori. no mention of GIM or dysplasia.   96 hr BRAVO Impression: Normal ambulatory esophageal pH study.     [facility]    Chart Notes   8/29/24 OV note from [facility] is not readable (Media)    11/11/2024 - OV note from [facility] Dr Yosef Marin  Assessment/Plan: Stephanie Garcia continues to have reflux despite her low-dose medications.  We discussed increasing her dosage or changing medications completely in conjunction with dietary and lifestyle management strategies.  She has been doing this for some time and is not very much interested in continuing to take medications.  Having said that we also discussed surgical options including partial fundoplication versus magnetic sphincter augmentation.  We went over the risks and benefits of each and the pros and cons of each approach.  She would like to consider her options and will call back into the clinic when she is ready to schedule surgery or discuss the next steps moving forward.     2/15/25 - OV note from [facility] Dr Martha Coats - \"After testing with EGD and esophagram, patient does not have hiatal hernia does not have any significant reflux. Therefore, no surgical intervention is indicated. \"    Family Hx  No other known family " history or GI related malignancy (esophageal, gastric, pancreatic, liver or colon) or family history of IBD/celiac disease.     Social Hx   No use of NSAIDs    No ETOH  No tobacco products   No recreational drug use     PERTINENT PAST MEDICAL HISTORY:  Past Medical History:   Diagnosis Date     Essential hypertension 12/18/2019     Gastroesophageal reflux disease      Gastroesophageal reflux disease without esophagitis 11/19/2021     Motion sickness      Other chronic pain        PREVIOUS SURGERIES:  Past Surgical History:   Procedure Laterality Date     ESOPHAGOSCOPY, GASTROSCOPY, DUODENOSCOPY (EGD), COMBINED N/A 1/4/2022    Procedure: ESOPHAGOGASTRODUODENOSCOPY, WITH BIOPSY;  Surgeon: Luis Marin DO;  Location: [place] [facility]     NO HISTORY OF SURGERY         ALLERGIES:     Allergies   Allergen Reactions     Nkda [No Known Drug Allergy]        PERTINENT MEDICATIONS:    Current Outpatient Medications:      hydroCHLOROthiazide 12.5 MG tablet, Take 1 tablet (12.5 mg) by mouth daily., Disp: 90 tablet, Rfl: 3     pantoprazole (PROTONIX) 20 MG EC tablet, Take 20 mg by mouth daily. (Patient not taking: Reported on 7/24/2025), Disp: , Rfl:     SOCIAL HISTORY:  Social History     Socioeconomic History     Marital status:      Spouse name: Not on file     Number of children: Not on file     Years of education: Not on file     Highest education level: Not on file   Occupational History     Occupation: retail and restaurant   Tobacco Use     Smoking status: Never     Smokeless tobacco: Never     Tobacco comments:      smokes   Substance and Sexual Activity     Alcohol use: No     Alcohol/week: 0.0 standard drinks of alcohol     Drug use: No     Sexual activity: Yes     Partners: Male   Other Topics Concern     Not on file   Social History Narrative     Not on file     Social Drivers of Health     Financial Resource Strain: Low Risk  (5/10/2025)    Financial Resource Strain      Within the past 12  months, have you or your family members you live with been unable to get utilities (heat, electricity) when it was really needed?: No   Food Insecurity: Low Risk  (5/10/2025)    Food Insecurity      Within the past 12 months, did you worry that your food would run out before you got money to buy more?: No      Within the past 12 months, did the food you bought just not last and you didn t have money to get more?: No   Transportation Needs: Low Risk  (5/10/2025)    Transportation Needs      Within the past 12 months, has lack of transportation kept you from medical appointments, getting your medicines, non-medical meetings or appointments, work, or from getting things that you need?: No   Physical Activity: Insufficiently Active (5/10/2025)    Exercise Vital Sign      Days of Exercise per Week: 3 days      Minutes of Exercise per Session: 30 min   Stress: No Stress Concern Present (5/10/2025)    Citizen of [place] of Occupational Health - Occupational Stress Questionnaire      Feeling of Stress : Only a little   Social Connections: Unknown (5/10/2025)    Social Connection and Isolation Panel [NHANES]      Frequency of Communication with Friends and Family: Not on file      Frequency of Social Gatherings with Friends and Family: Once a week      Attends Synagogue Services: Not on file      Active Member of Clubs or Organizations: Not on file      Attends Club or Organization Meetings: Not on file      Marital Status: Not on file   Interpersonal Safety: Low Risk  (5/12/2025)    Interpersonal Safety      Do you feel physically and emotionally safe where you currently live?: Yes      Within the past 12 months, have you been hit, slapped, kicked or otherwise physically hurt by someone?: No      Within the past 12 months, have you been humiliated or emotionally abused in other ways by your partner or ex-partner?: No   Housing Stability: Low Risk  (5/10/2025)    Housing Stability      Do you have housing? : Yes      Are you  worried about losing your housing?: No       FAMILY HISTORY:  Family History   Problem Relation Age of Onset     Diabetes Mother      Hypertension Mother      Hypertension Father      Deep Vein Thrombosis (DVT) Father      Hypertension Sister      Hypertension Sister      No Known Problems Brother      No Known Problems Maternal Grandmother      No Known Problems Maternal Grandfather      No Known Problems Paternal Grandmother      No Known Problems Paternal Grandfather      No Known Problems Daughter      No Known Problems Son      No Known Problems Maternal Half-Brother      No Known Problems Maternal Half-Sister      No Known Problems Paternal Half-Brother      No Known Problems Paternal Half-Sister      No Known Problems Niece      No Known Problems Nephew      No Known Problems Cousin      No Known Problems Other      Coronary Artery Disease No family hx of      Breast Cancer No family hx of      Colon Cancer No family hx of      Prostate Cancer No family hx of      Other Cancer No family hx of      Cancer No family hx of      Heart Disease No family hx of      Hyperlipidemia No family hx of      Kidney Disease No family hx of      Cerebrovascular Disease No family hx of      Obesity No family hx of      Thrombosis No family hx of      Asthma No family hx of      Arthritis No family hx of      Thyroid Disease No family hx of      Depression No family hx of      Mental Illness No family hx of      Substance Abuse No family hx of      Cystic Fibrosis No family hx of      Early Death No family hx of      Coronary Artery Disease Early Onset No family hx of      Heart Failure No family hx of      Bleeding Diathesis No family hx of      Dementia No family hx of      Ovarian Cancer No family hx of      Uterine Cancer No family hx of      Colorectal Cancer No family hx of      Pancreatic Cancer No family hx of      Lung Cancer No family hx of      Melanoma No family hx of      Autoimmune Disease No family hx of       "Unknown/Adopted No family hx of      Genetic Disorder No family hx of        Past/family/social history reviewed and no changes    PHYSICAL EXAMINATION:  Vitals reviewed: /79 (BP Location: Left arm)   Pulse 60   Resp 18   Ht 1.532 m (5' 0.32\")   Wt 53.6 kg (118 lb 1.3 oz)   SpO2 99%   BMI 22.82 kg/m    Constitutional: aaox3, cooperative, pleasant, not dyspneic/diaphoretic, no acute distress  Eyes: Sclera anicteric/injected  Ears/nose/mouth/throat: hearing intact  Neck: supple, active ROM w/o limitation or pain   CV: No edema  Respiratory: Unlabored breathing  Abd:  Nondistended, no hepatosplenomegaly, hypertympanic on percussion throughout with some dullness noted to LLQ, nontender, no peritoneal signs, neg Ortiz's sign  Skin: warm, perfused, no jaundice  Psych: Normal affect  MSK: Normal gait     PERTINENT STUDIES:    Office Visit on 05/12/2025   Component Date Value Ref Range Status     Sodium 05/12/2025 139  135 - 145 mmol/L Final     Potassium 05/12/2025 4.1  3.4 - 5.3 mmol/L Final     Chloride 05/12/2025 103  98 - 107 mmol/L Final     Carbon Dioxide (CO2) 05/12/2025 26  22 - 29 mmol/L Final     Anion Gap 05/12/2025 10  7 - 15 mmol/L Final     Urea Nitrogen 05/12/2025 17.7  6.0 - 20.0 mg/dL Final     Creatinine 05/12/2025 0.63  0.51 - 0.95 mg/dL Final     GFR Estimate 05/12/2025 >90  >60 mL/min/1.73m2 Final     Calcium 05/12/2025 9.5  8.8 - 10.4 mg/dL Final     Glucose 05/12/2025 86  70 - 99 mg/dL Final     Patient Fasting > 8hrs? 05/12/2025 Unknown   Final     Cholesterol 05/12/2025 169  <200 mg/dL Final     Triglycerides 05/12/2025 121  <150 mg/dL Final     Direct Measure HDL 05/12/2025 66  >=50 mg/dL Final     LDL Cholesterol Calculated 05/12/2025 79  <100 mg/dL Final     Non HDL Cholesterol 05/12/2025 103  <130 mg/dL Final     Patient Fasting > 8hrs? 05/12/2025 Unknown   Final     Estimated Average Glucose 05/12/2025 100  <117 mg/dL Final     Hemoglobin A1C 05/12/2025 5.1  0.0 - 5.6 % Final "     Last Comprehensive Metabolic Panel:  Lab Results   Component Value Date     05/12/2025    POTASSIUM 4.1 05/12/2025    CHLORIDE 103 05/12/2025    CO2 26 05/12/2025    ANIONGAP 10 05/12/2025    GLC 86 05/12/2025    BUN 17.7 05/12/2025    CR 0.63 05/12/2025    GFRESTIMATED >90 05/12/2025    SMITH 9.5 05/12/2025       TSH   Date Value Ref Range Status   04/17/2023 2.68 0.30 - 4.20 uIU/mL Final        PREVIOUS ENDOSCOPY      2/8/2023 EGD pH 48 hr monitoring   Impression: Normal macro exam  BRAVO: Normal ambulatory esophogeal pH study   Acid exposure time 0.1%  DeMeester score 0.6  Nubmer of Sx occurance: 5 (N/) and 1 sourness in mouth  Sx index of reflux (SI): 0,0  Sx association prob (SAP): 0,0      2/17/2025 - EGD w/ BRAVO off therapy   Impression:            - Normal esophagus.                          - Normal stomach. Biopsied.                          - Normal examined duodenum.                          - The BRAVO capsule was deployed.     FINAL DIAGNOSIS    A.  Stomach, biopsy:  Focal mild chronic gastritis with no activity  Negative for Helicobacter pylori on H&E        BRAVO Impression: Normal ambulatory esophageal pH study.  Findings:       DAY 1 ACID REFLUX ANALYSIS:       - Acid Exposure with pH < 4.0:       Total Percent Time: 1.2 %.       - Number of Reflux Episodes:       Total Refluxes: 8       Number of reflux episodes > 5 minutes: 0.       - Longest reflux episode: 4 minutes       - Symptom Correlation: Reflux episodes did not correlate with       symptoms noted during the study.       - DeMeester Score: 3.4(N<14.7)       - See the [employer] BRAVO data report for further details.       DAY 2 ACID REFLUX ANALYSIS:       - Acid Exposure Time(s) for pH < 4.0:       Total Percent Time: 0 %.       - Number of Reflux Episodes:       Total Refluxes: 0       Number of reflux episodes > 5 minutes: 0.       - Symptom Correlation: Reflux episodes did not correlate with       symptoms noted during the  study.       - DeMeester Score: 0.3(N<14.7)       - See the [employer] BRAVO data report for further details.       DAY 3 ACID REFLUX ANALYSIS:       - Acid Exposure Time(s) for pH < 4.0:       Total Percent Time: 0 %.       - Number of Reflux Episodes:       Total Refluxes: 1       Number of reflux episodes > 5 minutes: 0.       - Longest reflux episode: 0 minutes.       - DeMeester Score: 0.4(N<14.7)       - See the [employer] BRAVO data report for further details.      Again, thank you for allowing me to participate in the care of your patient.        Sincerely,        Nena Phillips PA-C    Electronically signed

## 2025-07-28 ENCOUNTER — PATIENT OUTREACH (OUTPATIENT)
Dept: CARE COORDINATION | Facility: CLINIC | Age: 50
End: 2025-07-28
Payer: COMMERCIAL

## 2025-08-27 ENCOUNTER — VIRTUAL VISIT (OUTPATIENT)
Dept: GASTROENTEROLOGY | Facility: CLINIC | Age: 50
End: 2025-08-27
Attending: PHYSICIAN ASSISTANT
Payer: COMMERCIAL

## 2025-08-27 DIAGNOSIS — R14.0 BLOATING: ICD-10-CM

## 2025-09-03 ENCOUNTER — TELEPHONE (OUTPATIENT)
Dept: NUTRITION | Facility: CLINIC | Age: 50
End: 2025-09-03
Payer: COMMERCIAL